# Patient Record
Sex: FEMALE | Race: WHITE | NOT HISPANIC OR LATINO | Employment: OTHER | URBAN - NONMETROPOLITAN AREA
[De-identification: names, ages, dates, MRNs, and addresses within clinical notes are randomized per-mention and may not be internally consistent; named-entity substitution may affect disease eponyms.]

---

## 2017-01-24 ENCOUNTER — OFFICE VISIT (OUTPATIENT)
Dept: OPHTHALMOLOGY | Facility: CLINIC | Age: 72
End: 2017-01-24

## 2017-01-24 DIAGNOSIS — H15.101 EPISCLERITIS, RIGHT: Primary | ICD-10-CM

## 2017-01-24 DIAGNOSIS — IMO0002 NUCLEAR CATARACT, BILATERAL: ICD-10-CM

## 2017-01-24 PROBLEM — H15.109 EPISCLERITIS: Status: ACTIVE | Noted: 2017-01-24

## 2017-01-24 PROCEDURE — 99213 OFFICE O/P EST LOW 20 MIN: CPT | Performed by: OPHTHALMOLOGY

## 2017-01-24 NOTE — PROGRESS NOTES
Subjective   Robert Derick Larios is a 71 y.o. female.   Chief Complaint   Patient presents with   • red, puffy right eye     HPI     Red, tearing,itching, matting OD x 1 month, tx initially with tobradex?, matting resolved; no vision change; hx of cataracts       Last edited by Gabo Fitzgerald MD on 1/24/2017  8:30 AM.       Review of Systems   Eyes: Positive for redness and itching.       Objective   Visual Acuity (Snellen - Linear)      Right Left   Dist cc 20/30 20/100       Correction:  Glasses               Pupils      Pupils   Right PERRL   Left PERRL            Not recorded         Extraocular Movement      Right Left   Result Full Full                 Main Ophthalmology Exam     External Exam      Right Left    External Normal Normal      Slit Lamp Exam      Right Left    Lids/Lashes Normal incr tear layer, no reflux with NLS massage Normal    Conjunctiva/Sclera 1+ Injection White and quiet    Cornea Clear Clear    Anterior Chamber Deep and quiet Deep and quiet    Iris Round and reactive Round and reactive    Lens 1+ Nuclear sclerosis 1+ Nuclear sclerosis    Vitreous Normal Normal      Fundus Exam      Right Left    Disc Normal Normal    Macula Normal Normal    Vessels Normal Normal                Assessment/Plan   Robert was seen today for red, puffy right eye.    Diagnoses and all orders for this visit:    Episcleritis, right    Nuclear cataract, bilateral      PF qid with taper  F/u if not resolved

## 2017-05-30 ENCOUNTER — OFFICE VISIT (OUTPATIENT)
Dept: OPHTHALMOLOGY | Facility: CLINIC | Age: 72
End: 2017-05-30

## 2017-05-30 DIAGNOSIS — H16.042 CORNEAL ULCER, MARGINAL, LEFT: Primary | ICD-10-CM

## 2017-05-30 DIAGNOSIS — M35.01 KERATITIS SICCA, BILATERAL (HCC): ICD-10-CM

## 2017-05-30 DIAGNOSIS — IMO0002 NUCLEAR CATARACT, BILATERAL: ICD-10-CM

## 2017-05-30 PROBLEM — H16.049 CORNEAL ULCER, MARGINAL: Status: ACTIVE | Noted: 2017-05-30

## 2017-05-30 PROCEDURE — 99213 OFFICE O/P EST LOW 20 MIN: CPT | Performed by: OPHTHALMOLOGY

## 2017-05-30 RX ORDER — TOBRAMYCIN AND DEXAMETHASONE 3; 1 MG/ML; MG/ML
1 SUSPENSION/ DROPS OPHTHALMIC
Qty: 1 EACH | Refills: 0 | OUTPATIENT
Start: 2017-05-30 | End: 2017-07-15

## 2017-07-14 ENCOUNTER — LAB (OUTPATIENT)
Dept: LAB | Facility: HOSPITAL | Age: 72
End: 2017-07-14

## 2017-07-14 ENCOUNTER — HOSPITAL ENCOUNTER (OUTPATIENT)
Dept: CT IMAGING | Facility: HOSPITAL | Age: 72
Discharge: HOME | End: 2017-07-14
Admitting: EMERGENCY MEDICINE

## 2017-07-14 DIAGNOSIS — I10 ESSENTIAL HYPERTENSION: ICD-10-CM

## 2017-07-14 DIAGNOSIS — E78.5 HYPERLIPIDEMIA: ICD-10-CM

## 2017-07-14 DIAGNOSIS — I10 ESSENTIAL HYPERTENSION, MALIGNANT: Primary | ICD-10-CM

## 2017-07-14 DIAGNOSIS — E55.9 VITAMIN D DEFICIENCY: ICD-10-CM

## 2017-07-14 LAB
ARTICHOKE IGE QN: 129 MG/DL (ref 1–129)
CHOLEST SERPL-MCNC: 218 MG/DL (ref 0–199)
HDLC SERPL-MCNC: 50 MG/DL (ref 60–200)
LDLC/HDLC SERPL: 2.23 {RATIO} (ref 0–3.22)
TRIGL SERPL-MCNC: 283 MG/DL (ref 20–199)

## 2017-07-14 PROCEDURE — 70450 CT HEAD/BRAIN W/O DYE: CPT

## 2017-07-14 PROCEDURE — 85025 COMPLETE CBC W/AUTO DIFF WBC: CPT | Performed by: EMERGENCY MEDICINE

## 2017-07-14 PROCEDURE — 80053 COMPREHEN METABOLIC PANEL: CPT | Performed by: EMERGENCY MEDICINE

## 2017-07-14 PROCEDURE — 80061 LIPID PANEL: CPT | Performed by: EMERGENCY MEDICINE

## 2017-07-14 PROCEDURE — 87086 URINE CULTURE/COLONY COUNT: CPT | Performed by: EMERGENCY MEDICINE

## 2017-07-14 PROCEDURE — 82652 VIT D 1 25-DIHYDROXY: CPT | Performed by: GENERAL PRACTICE

## 2017-07-17 ENCOUNTER — OFFICE VISIT (OUTPATIENT)
Dept: CARDIOLOGY | Facility: CLINIC | Age: 72
End: 2017-07-17

## 2017-07-17 VITALS
HEIGHT: 63 IN | SYSTOLIC BLOOD PRESSURE: 142 MMHG | BODY MASS INDEX: 27.29 KG/M2 | DIASTOLIC BLOOD PRESSURE: 84 MMHG | WEIGHT: 154 LBS | HEART RATE: 80 BPM

## 2017-07-17 DIAGNOSIS — R94.31 ABNORMAL EKG: ICD-10-CM

## 2017-07-17 DIAGNOSIS — E78.2 MIXED HYPERLIPIDEMIA: Primary | ICD-10-CM

## 2017-07-17 DIAGNOSIS — R00.2 PALPITATION: ICD-10-CM

## 2017-07-17 DIAGNOSIS — R42 DIZZINESS: ICD-10-CM

## 2017-07-17 DIAGNOSIS — I10 ESSENTIAL HYPERTENSION: ICD-10-CM

## 2017-07-17 DIAGNOSIS — R07.2 PRECORDIAL PAIN: ICD-10-CM

## 2017-07-17 PROCEDURE — 99204 OFFICE O/P NEW MOD 45 MIN: CPT | Performed by: INTERNAL MEDICINE

## 2017-07-17 PROCEDURE — 93000 ELECTROCARDIOGRAM COMPLETE: CPT | Performed by: INTERNAL MEDICINE

## 2017-07-17 RX ORDER — METOPROLOL SUCCINATE 25 MG/1
25 TABLET, EXTENDED RELEASE ORAL DAILY
Qty: 30 TABLET | Refills: 11 | Status: SHIPPED | OUTPATIENT
Start: 2017-07-17 | End: 2018-07-02 | Stop reason: SDUPTHER

## 2017-07-17 RX ORDER — PROPRANOLOL HYDROCHLORIDE 20 MG/1
20 TABLET ORAL DAILY
COMMUNITY
End: 2017-07-17

## 2017-07-17 RX ORDER — VITAMIN E 268 MG
400 CAPSULE ORAL DAILY
COMMUNITY
End: 2018-10-15

## 2017-07-17 RX ORDER — DOCUSATE SODIUM 100 MG/1
100 CAPSULE, LIQUID FILLED ORAL DAILY
COMMUNITY
End: 2017-08-03

## 2017-07-17 RX ORDER — ASPIRIN 81 MG/1
81 TABLET ORAL WEEKLY
COMMUNITY

## 2017-07-17 NOTE — PROGRESS NOTES
Robert Larios  71 y.o. female    07/17/2017  1. Mixed hyperlipidemia    2. Essential hypertension    3. Palpitation    4. Abnormal EKG    5. Dizziness    6. Precordial pain        History of Present Illness    Mrs. Larios is a 71 year-old  lady was referred for evaluation of palpitation and an abnormal EKG.  She was recently evaluated by Dr. Vu for dizziness and palpitation.  Her dizziness has been present for the past few weeks and she has attributed this to an inner ear problem on the left side.  She has been treated with meclizine.  She has had intermittent episodes of palpitation with seems to occur at different times and usually triggered by anxiety.  This is a long-standing problem and she has use propranolol when necessary for this.  She brought several blood pressure readings and her systolic blood pressures have ranged from 100 to 130 and diastolic from 70-90.  He indicated that her blood pressure is usually much higher in the doctor's office.  She has no previous documented coronary artery disease, valvular heart disease or cardiac arrhythmias.  An EKG performed by  apparently showed ST-T wave changes and hence cardiac consultation was requested.  EKG in our office showed sinus rhythm with nonspecific ST-T changes and poor R wave progression in the anteroseptal leads.  No arrhythmia was noted.  She has no history of excessive caffeine intake or use of over-the-counter medications or thyroid abnormalities.  She denied any anderson chest pain but occasionally has chest pressure with palpitations.        SUBJECTIVE    Allergies   Allergen Reactions   • Adhesive Tape      Blistering of Skin   • Darvon [Propoxyphene] Nausea And Vomiting   • Bactrim [Sulfamethoxazole-Trimethoprim] Rash   • Macrodantin [Nitrofurantoin Macrocrystal]      Drug Fever   • Sulfa Antibiotics Rash         Past Medical History:   Diagnosis Date   • Acute otitis media 09/10/2015   • Acute pharyngitis 09/10/2015    • Acute sinusitis 09/10/2015   • Biliary colic 10/17/2014    STONES BY REVIEW US   • Change in bowel habits 06/28/2016   • Chest rales [id number]    MEDIUM   • Cholelithiasis without obstruction 10/17/2014   • Chronic cholecystitis 10/17/2014   • Constipation 02/26/2015   • Degenerative joint disease involving multiple joints 09/10/2014   • Elevated blood-pressure reading without diagnosis of hypertension 05/31/2012    BLOOD PRESSURE NORMAL AT HOME   • Epigastric pain 09/10/2014   • Essential hypertension 07/30/2015   • Gastritis 02/26/2015   • GERD (gastroesophageal reflux disease) 01/28/2015   • Hammer toe 12/02/2015   • History of colonoscopy 08/28/2014    Information taken from [facility]    • Hyperlipidemia 07/30/2015   • Ingrowing nail [id number]   • Irritable bowel disease    • Pain in right foot 11/11/2015   • Peripheral neuropathy 09/10/2014         Past Surgical History:   Procedure Laterality Date   • BREAST BIOPSY  11/22/2004    R mammotome biop w/ image guided placement of metallic localization clip & stereotactic location & guidance for breast biop. radiologic examination of the surgical specimen & unilateral mammography   • CHOLECYSTECTOMY  10/09/2014   • COLONOSCOPY  08/28/2014    Hemorrhoids found.   • DILATATION AND CURETTAGE  08/28/2014    Postmenopausal bleeding   • ENDOSCOPY  08/28/2014    Normal hypopharynx, esophagus. A hiatus hernia found in the GE junction.Non-erosive gastritis found inthe stomach. Multiple biopsies taken.Moderate amount of bile sained fluid in the stomach.Normal, symmetrical, and patent pylorus.Normal duodenum.   • HYSTERECTOMY  [id number]   • INJECTION OF MEDICATION  07/30/2015    KENALOG(1)   • NASAL MASS EXCISION     • TONSILLECTOMY           Family History   Problem Relation Age of Onset   • Thyroid disease Mother    • Prostate cancer Father    • Cancer Other    • Diabetes Other    • Heart disease Other    • Stroke Other    • Thyroid disease Other          Social History  "    Social History   • Marital status:      Spouse name: N/A   • Number of children: N/A   • Years of education: N/A     Occupational History   • Not on file.     Social History Main Topics   • Smoking status: Never Smoker   • Smokeless tobacco: Never Used   • Alcohol use No   • Drug use: No   • Sexual activity: Defer     Other Topics Concern   • Not on file     Social History Narrative         Current Outpatient Prescriptions   Medication Sig Dispense Refill   • aluminum hydroxide-mag carbonate (GAVISCON EXTRA RELIEF) 160-105 MG chewable tablet chewable tablet Chew Daily.     • aspirin 81 MG EC tablet Take 81 mg by mouth Daily.     • cephalexin (KEFLEX) 500 MG capsule Take 1 capsule by mouth 4 (Four) Times a Day for 7 days. 28 capsule 0   • docusate sodium (COLACE) 100 MG capsule Take 100 mg by mouth Daily.     • esomeprazole (NexIUM) 40 MG capsule Take 40 mg by mouth 2 (two) times a day.     • loperamide (IMODIUM) 2 MG capsule Take 2 mg by mouth As Needed for diarrhea (2 times per week).     • meclizine (ANTIVERT) 25 MG tablet Take 1 tablet by mouth 3 (Three) Times a Day As Needed for dizziness for up to 7 days. 21 tablet 0   • Multiple Vitamins-Minerals (HAIR SKIN AND NAILS FORMULA) tablet Take  by mouth.     • NON FORMULARY 30mg vitamin B-6  400 mcg vitamin B-12  350 mg DHA  150 mg EPA  200 mg L-Carnitine  50mg CO-Q 10  30 mg trans-resveratrol     • olopatadine (PATANOL) 0.1 % ophthalmic solution 1 drop 2 (Two) Times a Day.     • Unable to find 1 each 1 (One) Time. Bone Restore     • vitamin E 400 UNIT capsule Take 400 Units by mouth Daily.     • metoprolol succinate XL (TOPROL-XL) 25 MG 24 hr tablet Take 1 tablet by mouth Daily. 30 tablet 11     No current facility-administered medications for this visit.          OBJECTIVE    /84  Pulse 80  Ht 63\" (160 cm)  Wt 154 lb (69.9 kg)  BMI 27.28 kg/m2        Review of Systems     Constitutional:  Denies recent weight loss, weight gain, fever or " chills, no change in exercise tolerance     HENT:  Denies any hearing loss, epistaxis, hoarseness, or difficulty speaking.     Eyes: Wears eyeglasses or contact lenses     Respiratory:  Denies dyspnea with exertion,no cough, wheezing, or hemoptysis.     Cardiovascular: See HPI     Gastrointestinal:  Denies change in bowel habits, dyspepsia, ulcer disease, hematochezia, or melena.     Endocrine: Negative for cold intolerance, heat intolerance, polydipsia, polyphagia and polyuria.     Genitourinary: Negative.      Musculoskeletal: Denies any history of arthritic symptoms or back problems     Skin:  Denies any change in hair or nails, rashes, or skin lesions.     Allergic/Immunologic: Negative.  Negative for environmental allergies, food allergies and immunocompromised state.     Neurological:  Denies any history of recurrent headaches, strokes, TIA, or seizure disorder.     Hematological: Denies any food allergies, seasonal allergies, bleeding disorders, or lymphadenopathy.       Physical Exam     Constitutional: Cooperative, alert and oriented, well-developed,  in no acute distress.     HENT:   Head: Normocephalic, normal hair patterns, no masses or tenderness.  Ears, Nose, and Throat: No gross abnormalities. No pallor or cyanosis.   Eyes: EOMS intact, PERRL, conjunctivae and lids unremarkable. Fundoscopic exam and visual fields not performed.   Neck: No palpable masses or adenopathy, no thyromegaly, no JVD, carotid pulses are full and equal bilaterally and without  Bruits.     Cardiovascular: Regular rhythm, S1 and S2 normal, no S3 or S4.  No murmurs, gallops, or rubs detected.     Pulmonary/Chest: Chest: normal symmetry, no tenderness to palpation, normal respiratory excursion,  no use of accessory muscles.            Pulmonary: Normal breath sounds. No rales or ronchi.    Abdominal: Abdomen soft, bowel sounds normoactive, no masses, no hepatosplenomegaly, non-tender, no bruits.     Musculoskeletal: No deformities,  clubbing, cyanosis, erythema, or edema observed.      Neurological: No gross motor or sensory deficits noted, affect appropriate, oriented to time, person, place.     Skin: Warm and dry to the touch, no apparent skin lesions or masses noted.           ECG 12 Lead  Date/Time: 7/17/2017 5:17 PM  Performed by: SINA CORTES  Authorized by: SINA CORTES   Comparison: not compared with previous ECG   Rhythm: sinus rhythm  Rate: normal  QRS axis: normal  Comments: EKG shows sinus rhythm heart rate of 80 bpm.  Minimal nonspecific ST-T changes.  Poor R wave progression in the anteroseptal leads              Lab Results   Component Value Date    WBC 6.73 07/14/2017    HGB 14.9 07/14/2017    HCT 44.7 07/14/2017    MCV 92.2 07/14/2017     07/14/2017     Lab Results   Component Value Date    GLUCOSE 117 (H) 07/14/2017    BUN 14 07/14/2017    CREATININE 0.72 07/14/2017    EGFRIFNONA 80 07/14/2017    BCR 19.4 07/14/2017    CO2 22.0 07/14/2017    CALCIUM 9.4 07/14/2017    ALBUMIN 4.40 07/14/2017    LABIL2 1.5 07/14/2017    AST 38 (H) 07/14/2017    ALT 49 07/14/2017     Lab Results   Component Value Date    CHOL 218 (H) 07/14/2017     Lab Results   Component Value Date    TRIG 283 (H) 07/14/2017    TRIG 214 (H) 07/26/2016    TRIG 221 (H) 07/27/2015     Lab Results   Component Value Date    HDL 50 (L) 07/14/2017    HDL 53 (L) 07/26/2016     Lab Results   Component Value Date    LDLCALC 106 07/26/2016    LDLCALC 116 07/27/2015    LDLCALC 126 07/18/2014     No results found for: LDL  No results found for: HDLLDLRATIO  No components found for: CHOLHDL  Lab Results   Component Value Date    HGBA1C 5.6 07/27/2015     Lab Results   Component Value Date    TSH 1.06 12/04/2015           ASSESSMENT AND PLAN    Mrs. Larios has presented with dizziness which is most likely related to her in a or problems and unrelated to palpitations.  No arrhythmias have been documented thus far.  I would suggest discontinuing  propranolol and starting her on Toprol-XL 25 mg daily on a regular basis.  Antiplatelet therapy with aspirin has been continued.  An echocardiogram to assess left ventricular and valvular function and a CT angiogram to rule out coronary artery disease has been arranged.  Further recommendations will follow.  Thank you for asking me to see this patient.  Diagnoses and all orders for this visit:    Mixed hyperlipidemia  -     Adult Transthoracic Echo Complete; Future  -     CT Angiogram Coronary; Future    Essential hypertension  -     Adult Transthoracic Echo Complete; Future  -     CT Angiogram Coronary; Future    Palpitation  -     Adult Transthoracic Echo Complete; Future  -     CT Angiogram Coronary; Future    Abnormal EKG  -     Adult Transthoracic Echo Complete; Future  -     CT Angiogram Coronary; Future    Dizziness  -     Adult Transthoracic Echo Complete; Future  -     CT Angiogram Coronary; Future    Precordial pain  -     CT Angiogram Coronary; Future        Peggy Govea MD  7/17/2017  5:11 PM

## 2017-07-18 LAB — 1,25(OH)2D3 SERPL-MCNC: 73 PG/ML (ref 19.9–79.3)

## 2017-07-20 ENCOUNTER — HOSPITAL ENCOUNTER (OUTPATIENT)
Dept: CT IMAGING | Facility: HOSPITAL | Age: 72
Discharge: HOME | End: 2017-07-20
Admitting: INTERNAL MEDICINE

## 2017-07-20 VITALS
HEART RATE: 80 BPM | DIASTOLIC BLOOD PRESSURE: 73 MMHG | SYSTOLIC BLOOD PRESSURE: 160 MMHG | RESPIRATION RATE: 18 BRPM | OXYGEN SATURATION: 96 %

## 2017-07-20 DIAGNOSIS — R94.31 ABNORMAL EKG: ICD-10-CM

## 2017-07-20 DIAGNOSIS — I10 ESSENTIAL HYPERTENSION: ICD-10-CM

## 2017-07-20 DIAGNOSIS — R07.2 PRECORDIAL PAIN: ICD-10-CM

## 2017-07-20 DIAGNOSIS — E78.2 MIXED HYPERLIPIDEMIA: ICD-10-CM

## 2017-07-20 DIAGNOSIS — R00.2 PALPITATION: ICD-10-CM

## 2017-07-20 DIAGNOSIS — R42 DIZZINESS: ICD-10-CM

## 2017-07-20 PROCEDURE — 0 IOPAMIDOL PER 1 ML: Performed by: INTERNAL MEDICINE

## 2017-07-20 PROCEDURE — 75574 CT ANGIO HRT W/3D IMAGE: CPT

## 2017-07-20 RX ORDER — METOPROLOL TARTRATE 5 MG/5ML
INJECTION INTRAVENOUS
Status: COMPLETED | OUTPATIENT
Start: 2017-07-20 | End: 2017-07-20

## 2017-07-20 RX ADMIN — METOPROLOL TARTRATE 5 MG: 5 INJECTION INTRAVENOUS at 11:27

## 2017-07-20 RX ADMIN — METOPROLOL TARTRATE 5 MG: 5 INJECTION INTRAVENOUS at 11:34

## 2017-07-20 RX ADMIN — IOPAMIDOL 80 ML: 755 INJECTION, SOLUTION INTRAVENOUS at 12:00

## 2017-07-20 RX ADMIN — METOPROLOL TARTRATE 5 MG: 5 INJECTION INTRAVENOUS at 11:21

## 2017-08-03 ENCOUNTER — LAB (OUTPATIENT)
Dept: LAB | Facility: HOSPITAL | Age: 72
End: 2017-08-03

## 2017-08-03 ENCOUNTER — OFFICE VISIT (OUTPATIENT)
Dept: FAMILY MEDICINE CLINIC | Facility: CLINIC | Age: 72
End: 2017-08-03

## 2017-08-03 VITALS
HEART RATE: 90 BPM | SYSTOLIC BLOOD PRESSURE: 146 MMHG | WEIGHT: 158.7 LBS | DIASTOLIC BLOOD PRESSURE: 66 MMHG | OXYGEN SATURATION: 98 % | HEIGHT: 63 IN | BODY MASS INDEX: 28.12 KG/M2

## 2017-08-03 DIAGNOSIS — Z12.31 ENCOUNTER FOR SCREENING MAMMOGRAM FOR MALIGNANT NEOPLASM OF BREAST: ICD-10-CM

## 2017-08-03 DIAGNOSIS — M81.0 OSTEOPOROSIS: ICD-10-CM

## 2017-08-03 DIAGNOSIS — I10 ESSENTIAL HYPERTENSION, MALIGNANT: ICD-10-CM

## 2017-08-03 DIAGNOSIS — Z13.820 ENCOUNTER FOR SCREENING FOR OSTEOPOROSIS: ICD-10-CM

## 2017-08-03 DIAGNOSIS — Z11.59 NEED FOR HEPATITIS C SCREENING TEST: ICD-10-CM

## 2017-08-03 DIAGNOSIS — E78.2 MIXED HYPERLIPIDEMIA: Primary | Chronic | ICD-10-CM

## 2017-08-03 DIAGNOSIS — I10 ESSENTIAL HYPERTENSION: Chronic | ICD-10-CM

## 2017-08-03 DIAGNOSIS — R63.5 WEIGHT GAIN: ICD-10-CM

## 2017-08-03 LAB
ALBUMIN SERPL-MCNC: 4 G/DL (ref 3.4–4.8)
ALBUMIN/GLOB SERPL: 1.4 G/DL (ref 1.1–1.8)
ALP SERPL-CCNC: 73 U/L (ref 38–126)
ALT SERPL W P-5'-P-CCNC: 42 U/L (ref 9–52)
ANION GAP SERPL CALCULATED.3IONS-SCNC: 13 MMOL/L (ref 5–15)
AST SERPL-CCNC: 29 U/L (ref 14–36)
BILIRUB SERPL-MCNC: 0.6 MG/DL (ref 0.2–1.3)
BUN BLD-MCNC: 12 MG/DL (ref 7–21)
BUN/CREAT SERPL: 17.9 (ref 7–25)
CALCIUM SPEC-SCNC: 9.8 MG/DL (ref 8.4–10.2)
CHLORIDE SERPL-SCNC: 104 MMOL/L (ref 95–110)
CO2 SERPL-SCNC: 22 MMOL/L (ref 22–31)
CREAT BLD-MCNC: 0.67 MG/DL (ref 0.5–1)
GFR SERPL CREATININE-BSD FRML MDRD: 87 ML/MIN/1.73 (ref 60–90)
GLOBULIN UR ELPH-MCNC: 2.9 GM/DL (ref 2.3–3.5)
GLUCOSE BLD-MCNC: 85 MG/DL (ref 60–100)
POTASSIUM BLD-SCNC: 4.1 MMOL/L (ref 3.5–5.1)
PROT SERPL-MCNC: 6.9 G/DL (ref 6.3–8.6)
SODIUM BLD-SCNC: 139 MMOL/L (ref 137–145)
T4 FREE SERPL-MCNC: 1.26 NG/DL (ref 0.78–2.19)
TSH SERPL DL<=0.05 MIU/L-ACNC: 0.53 MIU/ML (ref 0.46–4.68)

## 2017-08-03 PROCEDURE — 36415 COLL VENOUS BLD VENIPUNCTURE: CPT

## 2017-08-03 PROCEDURE — 84443 ASSAY THYROID STIM HORMONE: CPT | Performed by: GENERAL PRACTICE

## 2017-08-03 PROCEDURE — 84439 ASSAY OF FREE THYROXINE: CPT | Performed by: GENERAL PRACTICE

## 2017-08-03 PROCEDURE — 99214 OFFICE O/P EST MOD 30 MIN: CPT | Performed by: GENERAL PRACTICE

## 2017-08-03 PROCEDURE — 80053 COMPREHEN METABOLIC PANEL: CPT | Performed by: GENERAL PRACTICE

## 2017-08-03 PROCEDURE — 86803 HEPATITIS C AB TEST: CPT | Performed by: GENERAL PRACTICE

## 2017-08-03 RX ORDER — LANOLIN ALCOHOL/MO/W.PET/CERES
400 CREAM (GRAM) TOPICAL DAILY
COMMUNITY
Start: 2017-07-18 | End: 2020-11-02

## 2017-08-03 RX ORDER — MV-MIN/FOLIC/VIT K/LYCOP/COQ10 200-100MCG
CAPSULE ORAL
COMMUNITY
Start: 2017-07-18 | End: 2021-12-28

## 2017-08-03 NOTE — PROGRESS NOTES
Subjective   Robert Larios is a 72 y.o. female.     Chief Complaint   Patient presents with   • Hypertension   • Hyperlipidemia     For review and evaluation of management of chronic medical problems. Labs reviewed. Due for mammogram and bone density. Had dizziness in July, seen in [facility], was tachycardiac and EKG showed some ST changes so had CT angiogram and echo with no significant findings. Started on metoprolol by Dr. Govea for elevated blood pressure and hypertension. Has gained 10 lbs in last year unexpectedly.   Hypertension   This is a chronic problem. The current episode started more than 1 year ago. The problem is unchanged (white coat syndrome). The problem is controlled. Associated symptoms include anxiety. Pertinent negatives include no chest pain, headaches, neck pain, palpitations or shortness of breath. There are no associated agents to hypertension. Past treatments include beta blockers. The current treatment provides significant improvement. There are no compliance problems.    Hyperlipidemia   This is a chronic problem. The current episode started more than 1 year ago. The problem is controlled. Recent lipid tests were reviewed and are high (minimally elevated). There are no known factors aggravating her hyperlipidemia. Pertinent negatives include no chest pain, myalgias or shortness of breath. Current antihyperlipidemic treatment includes diet change. The current treatment provides significant improvement of lipids.      The following portions of the patient's history were reviewed and updated as appropriate: allergies, current medications, past family and social history and problem list.    Outpatient Medications Prior to Visit   Medication Sig Dispense Refill   • aluminum hydroxide-mag carbonate (GAVISCON EXTRA RELIEF) 160-105 MG chewable tablet chewable tablet Chew Daily.     • aspirin 81 MG EC tablet Take 81 mg by mouth Daily.     • esomeprazole (NexIUM) 40 MG capsule Take 40 mg  by mouth 2 (two) times a day.     • loperamide (IMODIUM) 2 MG capsule Take 2 mg by mouth As Needed for diarrhea (2 times per week).     • metoprolol succinate XL (TOPROL-XL) 25 MG 24 hr tablet Take 1 tablet by mouth Daily. 30 tablet 11   • Multiple Vitamins-Minerals (HAIR SKIN AND NAILS FORMULA) tablet Take  by mouth.     • NON FORMULARY 30mg vitamin B-6  400 mcg vitamin B-12  350 mg DHA  150 mg EPA  200 mg L-Carnitine  50mg CO-Q 10  30 mg trans-resveratrol     • Unable to find 1 each 1 (One) Time. Bone Restore     • vitamin E 400 UNIT capsule Take 400 Units by mouth Daily.     • docusate sodium (COLACE) 100 MG capsule Take 100 mg by mouth Daily.     • olopatadine (PATANOL) 0.1 % ophthalmic solution 1 drop 2 (Two) Times a Day.       No facility-administered medications prior to visit.      Review of Systems   Constitutional: Negative.  Negative for chills, fatigue, fever and unexpected weight change.   HENT: Negative.  Negative for congestion, ear pain, hearing loss, nosebleeds, rhinorrhea, sneezing, sore throat and tinnitus.    Eyes: Negative.  Negative for discharge.   Respiratory: Negative.  Negative for cough, shortness of breath and wheezing.    Cardiovascular: Negative.  Negative for chest pain and palpitations.   Gastrointestinal: Negative.  Negative for abdominal pain, constipation, diarrhea, nausea and vomiting.   Endocrine: Negative.    Genitourinary: Negative.  Negative for dysuria, frequency and urgency.   Musculoskeletal: Negative.  Negative for arthralgias, back pain, joint swelling, myalgias and neck pain.   Skin: Negative.  Negative for rash.   Allergic/Immunologic: Negative.    Neurological: Negative.  Negative for dizziness, weakness, numbness and headaches.   Hematological: Negative.  Negative for adenopathy.   Psychiatric/Behavioral: Negative.  Negative for dysphoric mood and sleep disturbance. The patient is not nervous/anxious.      Objective     Visit Vitals   • /66   • Pulse 90   • Ht  "63\" (160 cm)   • Wt 158 lb 11.2 oz (72 kg)   • SpO2 98%   • BMI 28.11 kg/m2     Physical Exam   Constitutional: She is oriented to person, place, and time. She appears well-developed and well-nourished. No distress.   HENT:   Head: Normocephalic and atraumatic.   Nose: Nose normal.   Mouth/Throat: Oropharynx is clear and moist.   Eyes: Conjunctivae and EOM are normal. Pupils are equal, round, and reactive to light. Right eye exhibits no discharge. Left eye exhibits no discharge.   Neck: No tracheal deviation present. No thyromegaly present.   Cardiovascular: Normal rate, regular rhythm, normal heart sounds and intact distal pulses.    No murmur heard.  Pulmonary/Chest: Effort normal and breath sounds normal. No respiratory distress. She has no wheezes. She has no rales. She exhibits no tenderness. Right breast exhibits no inverted nipple, no mass, no nipple discharge, no skin change and no tenderness. Left breast exhibits no inverted nipple, no mass, no nipple discharge, no skin change and no tenderness.   Abdominal: Soft. Bowel sounds are normal. She exhibits no distension and no mass. There is no tenderness. No hernia.   Musculoskeletal: Normal range of motion. She exhibits no deformity.   Lymphadenopathy:     She has no cervical adenopathy.   Neurological: She is alert and oriented to person, place, and time. She has normal reflexes.   Skin: Skin is warm and dry.   Psychiatric: She has a normal mood and affect. Her behavior is normal. Judgment and thought content normal.   Nursing note and vitals reviewed.    Results for orders placed or performed during the hospital encounter of 07/24/17   Adult Transthoracic Echo Complete   Result Value Ref Range    BSA 1.7 m^2     CV ECHO ALYCIA - RVDD 2.5 cm    IVSd 1.3 cm    LVIDd 4.4 cm    LVIDs 2.8 cm    LVPWd 1.1 cm    IVS/LVPW 1.1     FS 36.4 %    EDV(Teich) 87.7 ml    ESV(Teich) 29.6 ml    EF(Teich) 66.3 %    EDV(cubed) 85.2 ml    ESV(cubed) 22.0 ml    EF(cubed) 74.2 % "    LV mass(C)d 185.6 grams    LV mass(C)dI 107.3 grams/m^2    SV(Teich) 58.1 ml    SI(Teich) 33.6 ml/m^2    SV(cubed) 63.2 ml    SI(cubed) 36.5 ml/m^2    EPSS 0.3 cm    Ao root diam 3.3 cm    Ao root area 8.6 cm^2    ACS 1.8 cm    LA dimension 3.5 cm    LA/Ao 1.1     Ao root area (BSA corrected) 1.9     MV E max thad 78.0 cm/sec    MV A max thad 108.0 cm/sec    MV E/A 0.72     Ao pk thad 143.0 cm/sec    Ao max PG 8.2 mmHg    Ao max PG (full) 0.56 mmHg    Ao V2 mean 102.0 cm/sec    Ao mean PG 5.0 mmHg    Ao mean PG (full) 2.0 mmHg    Ao V2 VTI 31.8 cm    LV V1 max PG 7.6 mmHg    LV V1 mean PG 3.0 mmHg    LV V1 max 138.0 cm/sec    LV V1 mean 82.1 cm/sec    LV V1 VTI 28.2 cm    SV(Ao) 272.0 ml    SI(Ao) 157.2 ml/m^2    PA V2 max 90.9 cm/sec    PA max PG 3.3 mmHg    PA V2 mean 71.9 cm/sec    PA mean PG 2.0 mmHg    PA V2 VTI 20.9 cm    TR max thad 175.0 cm/sec    RVSP(TR) 22.3 mmHg    RAP systole 10.0 mmHg     CV ECHO ALYCIA - BZI_BMI 27.3 kilograms/m^2     CV ECHO ALYCIA - BSA(HAYCOCK) 1.8 m^2     CV ECHO ALYCIA - BZI_METRIC_WEIGHT 69.9 kg     CV ECHO ALYCIA - BZI_METRIC_HEIGHT 160.0 cm    Echo EF Estimated 60 %      Assessment/Plan   Problem List Items Addressed This Visit        Cardiovascular and Mediastinum    Hyperlipidemia (Chronic)    Essential hypertension (Chronic)      Other Visit Diagnoses     Encounter for screening mammogram for malignant neoplasm of breast    -  Primary    Relevant Orders    Mammo Screening Digital Tomosynthesis Bilateral With CAD    Osteoporosis        Encounter for screening for osteoporosis        Relevant Orders    DEXA Bone Density Axial    Weight gain        Relevant Orders    TSH    T4, Free    Need for hepatitis C screening test        Relevant Orders    Hepatitis C antibody        Will notify regarding results. Follow up with Dr. Butler as scheduled.     New Medications Ordered This Visit   Medications   • tobramycin-dexamethasone (TOBRADEX) 0.3-0.1 % ophthalmic ointment     Sig:  Administer  to both eyes Daily.   • Coenzyme Q10 150 MG capsule   • Calcium Carbonate-Vit D-Min (CALTRATE PLUS PO)   • Omega-3 350 MG capsule delayed-release   • folic acid (FOLVITE) 400 MCG tablet     Return in about 2 months (around 10/9/2017) for medicare wellness visit.

## 2017-08-04 LAB — HCV AB SER DONR QL: NEGATIVE

## 2017-08-23 ENCOUNTER — OFFICE VISIT (OUTPATIENT)
Dept: CARDIOLOGY | Facility: CLINIC | Age: 72
End: 2017-08-23

## 2017-08-23 VITALS
WEIGHT: 158 LBS | HEIGHT: 63 IN | HEART RATE: 79 BPM | BODY MASS INDEX: 28 KG/M2 | DIASTOLIC BLOOD PRESSURE: 85 MMHG | SYSTOLIC BLOOD PRESSURE: 130 MMHG

## 2017-08-23 DIAGNOSIS — I10 ESSENTIAL HYPERTENSION: Chronic | ICD-10-CM

## 2017-08-23 DIAGNOSIS — E78.2 MIXED HYPERLIPIDEMIA: Primary | Chronic | ICD-10-CM

## 2017-08-23 PROCEDURE — 99212 OFFICE O/P EST SF 10 MIN: CPT | Performed by: INTERNAL MEDICINE

## 2017-08-23 NOTE — PROGRESS NOTES
Robert Larios  72 y.o. female    08/23/2017  1. Mixed hyperlipidemia    2. Essential hypertension        History of Present Illness    Mrs. Larios is here for follow-up of above stated problems.  Her dizziness has improved and it was most likely secondary to inner ear year problems.  Echocardiogram showed normal LV systolic function with no wall motion abnormalities.  CT angiogram of the coronary arteries showed no CAD and calcium score was 0 with normal LV systolic function.        SUBJECTIVE    Allergies   Allergen Reactions   • Adhesive Tape      Blistering of Skin   • Darvon [Propoxyphene] Nausea And Vomiting   • Bactrim [Sulfamethoxazole-Trimethoprim] Rash   • Macrodantin [Nitrofurantoin Macrocrystal]      Drug Fever   • Sulfa Antibiotics Rash         Past Medical History:   Diagnosis Date   • Acute otitis media 09/10/2015   • Acute pharyngitis 09/10/2015   • Acute sinusitis 09/10/2015   • Biliary colic 10/17/2014    STONES BY REVIEW US   • Change in bowel habits 06/28/2016   • Chest rales [id number]    MEDIUM   • Cholelithiasis without obstruction 10/17/2014   • Chronic cholecystitis 10/17/2014   • Constipation 02/26/2015   • Degenerative joint disease involving multiple joints 09/10/2014   • Elevated blood-pressure reading without diagnosis of hypertension 05/31/2012    BLOOD PRESSURE NORMAL AT HOME   • Epigastric pain 09/10/2014   • Essential hypertension 07/30/2015   • Fibrocystic breast    • Gastritis 02/26/2015   • GERD (gastroesophageal reflux disease) 01/28/2015   • Hammer toe 12/02/2015   • History of colonoscopy 08/28/2014    Information [facility]    • Hyperlipidemia 07/30/2015   • Ingrowing nail [id number]   • Irritable bowel disease    • Pain in right foot 11/11/2015   • Peripheral neuropathy 09/10/2014         Past Surgical History:   Procedure Laterality Date   • BREAST BIOPSY  11/22/2004    R mammotome biop w/ image guided placement of metallic localization clip & stereotactic location &  guidance for breast biop. radiologic examination of the surgical specimen & unilateral mammography   • BREAST CYST ASPIRATION     • CHOLECYSTECTOMY  10/09/2014   • COLONOSCOPY  08/28/2014    Hemorrhoids found.   • DILATATION AND CURETTAGE  08/28/2014    Postmenopausal bleeding   • ENDOSCOPY  08/28/2014    Normal hypopharynx, esophagus. A hiatus hernia found in the GE junction.Non-erosive gastritis found inthe stomach. Multiple biopsies taken.Moderate amount of bile sained fluid in the stomach.Normal, symmetrical, and patent pylorus.Normal duodenum.   • HYSTERECTOMY  [id number]   • INJECTION OF MEDICATION  07/30/2015    KENALOG(1)   • NASAL MASS EXCISION     • TONSILLECTOMY           Family History   Problem Relation Age of Onset   • Thyroid disease Mother    • Prostate cancer Father    • Cancer Other    • Diabetes Other    • Heart disease Other    • Stroke Other    • Thyroid disease Other          Social History     Social History   • Marital status:      Spouse name: N/A   • Number of children: N/A   • Years of education: N/A     Occupational History   • Not on file.     Social History Main Topics   • Smoking status: Never Smoker   • Smokeless tobacco: Never Used   • Alcohol use No   • Drug use: No   • Sexual activity: Defer     Other Topics Concern   • Not on file     Social History Narrative         Current Outpatient Prescriptions   Medication Sig Dispense Refill   • aluminum hydroxide-mag carbonate (GAVISCON EXTRA RELIEF) 160-105 MG chewable tablet chewable tablet Chew Daily.     • aspirin 81 MG EC tablet Take 81 mg by mouth Daily.     • Calcium Carbonate-Vit D-Min (CALTRATE PLUS PO)      • Coenzyme Q10 150 MG capsule      • esomeprazole (nexIUM) 20 MG capsule Take 20 mg by mouth 2 (Two) Times a Day.     • folic acid (FOLVITE) 400 MCG tablet      • loperamide (IMODIUM) 2 MG capsule Take 2 mg by mouth As Needed for diarrhea (2 times per week).     • metoprolol succinate XL (TOPROL-XL) 25 MG 24 hr tablet  "Take 1 tablet by mouth Daily. 30 tablet 11   • Multiple Vitamins-Minerals (HAIR SKIN AND NAILS FORMULA) tablet Take  by mouth.     • NON FORMULARY 30mg vitamin B-6  400 mcg vitamin B-12  350 mg DHA  150 mg EPA  200 mg L-Carnitine  50mg CO-Q 10  30 mg trans-resveratrol     • Omega-3 350 MG capsule delayed-release      • Unable to find 1 each 1 (One) Time. Bone Restore     • vitamin E 400 UNIT capsule Take 400 Units by mouth Daily.       No current facility-administered medications for this visit.          OBJECTIVE    /85  Pulse 79  Ht 63\" (160 cm)  Wt 158 lb (71.7 kg)  BMI 27.99 kg/m2        Review of Systems     Constitutional:  Denies recent weight loss, weight gain, fever or chills, no change in exercise tolerance     HENT:  Denies any hearing loss, epistaxis, hoarseness, or difficulty speaking.     Eyes: Wears eyeglasses or contact lenses     Respiratory:  Denies dyspnea with exertion,no cough, wheezing, or hemoptysis.     Cardiovascular: Negative for palpations, chest pain, orthopnea, PND, peripheral edema, syncope, or claudication.     Gastrointestinal:  Denies change in bowel habits, dyspepsia, ulcer disease, hematochezia, or melena.     Endocrine: Negative for cold intolerance, heat intolerance, polydipsia, polyphagia and polyuria.    Genitourinary: Negative.      Musculoskeletal: Denies any history of arthritic symptoms or back problems     Skin:  Denies any change in hair or nails, rashes, or skin lesions.     Allergic/Immunologic: Negative.  Negative for environmental allergies, food allergies and immunocompromised state.     Neurological:  Denies any history of recurrent headaches, strokes, TIA, or seizure disorder.     Hematological: Denies any food allergies, seasonal allergies, bleeding disorders, or lymphadenopathy.     Psychiatric/Behavioral: Denies any history of depression, substance abuse, or change in cognitive function.         Physical Exam     Constitutional: Cooperative, alert and " oriented, well-developed, well-nourished, in no acute distress.     HENT:   Head: Normocephalic, normal hair patterns, no masses or tenderness.  Ears, Nose, and Throat: No gross abnormalities. No pallor or cyanosis.   Eyes: EOMS intact, PERRL, conjunctivae and lids unremarkable. Fundoscopic exam and visual fields not performed.   Neck: No palpable masses or adenopathy, no thyromegaly, no JVD, carotid pulses are full and equal bilaterally and without  Bruits.     Cardiovascular: Regular rhythm, S1 and S2 normal, no S3 or S4. No murmurs, gallops, or rubs detected.     Pulmonary/Chest: Chest: normal symmetry, no tenderness to palpation, normal respiratory excursion, no intercostal retraction, no use of accessory muscles.            Pulmonary: Normal breath sounds. No rales or ronchi.    Abdominal: Abdomen soft, bowel sounds normoactive, no masses, no hepatosplenomegaly, non-tender, no bruits.     Musculoskeletal: No deformities, clubbing, cyanosis, erythema, or edema observed.     Procedures      Lab Results   Component Value Date    WBC 6.73 07/14/2017    HGB 14.9 07/14/2017    HCT 44.7 07/14/2017    MCV 92.2 07/14/2017     07/14/2017     Lab Results   Component Value Date    GLUCOSE 85 08/03/2017    BUN 12 08/03/2017    CREATININE 0.67 08/03/2017    EGFRIFNONA 87 08/03/2017    BCR 17.9 08/03/2017    CO2 22.0 08/03/2017    CALCIUM 9.8 08/03/2017    ALBUMIN 4.00 08/03/2017    LABIL2 1.4 08/03/2017    AST 29 08/03/2017    ALT 42 08/03/2017     Lab Results   Component Value Date    CHOL 218 (H) 07/14/2017     Lab Results   Component Value Date    TRIG 283 (H) 07/14/2017    TRIG 214 (H) 07/26/2016    TRIG 221 (H) 07/27/2015     Lab Results   Component Value Date    HDL 50 (L) 07/14/2017    HDL 53 (L) 07/26/2016     Lab Results   Component Value Date    LDLCALC 106 07/26/2016    LDLCALC 116 07/27/2015    LDLCALC 126 07/18/2014     No results found for: LDL  No results found for: HDLLDLRATIO  No components found for:  CHOLHDL  Lab Results   Component Value Date    HGBA1C 5.6 07/27/2015     Lab Results   Component Value Date    TSH 0.530 08/03/2017           ASSESSMENT AND PLAN  Mrs. Larios is doing well from a cardiac standpoint and no further cardiac workup is indicated at this time.  She has been reassured.  Metoprolol succinate has been continued along with low-dose aspirin.  She will watch her diet closely to optimize her lipid profile.  She will continue follow-up with Dr. Shah and we'll be glad to see her on a yearly basis.    Diagnoses and all orders for this visit:    Mixed hyperlipidemia    Essential hypertension        Peggy Govea MD  8/23/2017  12:13 PM

## 2017-08-24 PROBLEM — M81.0 OSTEOPOROSIS: Status: ACTIVE | Noted: 2017-08-24

## 2017-09-18 ENCOUNTER — TELEPHONE (OUTPATIENT)
Dept: FAMILY MEDICINE CLINIC | Facility: CLINIC | Age: 72
End: 2017-09-18

## 2017-09-26 ENCOUNTER — TELEPHONE (OUTPATIENT)
Dept: FAMILY MEDICINE CLINIC | Facility: CLINIC | Age: 72
End: 2017-09-26

## 2017-10-04 ENCOUNTER — OFFICE VISIT (OUTPATIENT)
Dept: FAMILY MEDICINE CLINIC | Facility: CLINIC | Age: 72
End: 2017-10-04

## 2017-10-04 VITALS
DIASTOLIC BLOOD PRESSURE: 62 MMHG | HEIGHT: 63 IN | HEART RATE: 99 BPM | BODY MASS INDEX: 28.14 KG/M2 | SYSTOLIC BLOOD PRESSURE: 138 MMHG | OXYGEN SATURATION: 99 % | WEIGHT: 158.8 LBS

## 2017-10-04 DIAGNOSIS — Z23 NEED FOR VACCINATION: ICD-10-CM

## 2017-10-04 DIAGNOSIS — Z00.00 MEDICARE ANNUAL WELLNESS VISIT, INITIAL: Primary | ICD-10-CM

## 2017-10-04 DIAGNOSIS — E78.2 MIXED HYPERLIPIDEMIA: Chronic | ICD-10-CM

## 2017-10-04 DIAGNOSIS — I10 ESSENTIAL HYPERTENSION: Chronic | ICD-10-CM

## 2017-10-04 DIAGNOSIS — M81.0 AGE-RELATED OSTEOPOROSIS WITHOUT CURRENT PATHOLOGICAL FRACTURE: ICD-10-CM

## 2017-10-04 PROCEDURE — G0008 ADMIN INFLUENZA VIRUS VAC: HCPCS | Performed by: GENERAL PRACTICE

## 2017-10-04 PROCEDURE — 90662 IIV NO PRSV INCREASED AG IM: CPT | Performed by: GENERAL PRACTICE

## 2017-10-04 PROCEDURE — G0438 PPPS, INITIAL VISIT: HCPCS | Performed by: GENERAL PRACTICE

## 2017-10-04 RX ORDER — DOXYCYCLINE HYCLATE 20 MG
20 TABLET ORAL DAILY
COMMUNITY
End: 2018-11-29

## 2017-10-04 NOTE — PATIENT INSTRUCTIONS
Medicare Wellness  Personal Prevention Plan of Service     Date of Office Visit:  10/04/2017  Encounter Provider:  Ida Shah MD  Place of Service:  [facility]  Patient Name: Robert Larios  :  1945    As part of the Medicare Wellness portion of your visit today, we are providing you with this personalized preventive plan of services (PPPS). This plan is based upon recommendations of the [place] Preventive Services Task Force (USPSTF) and the Advisory Committee on Immunization Practices (ACIP).    This lists the preventive care services that should be considered, and provides dates of when you are due. Items listed as completed are up-to-date and do not require any further intervention.    Health Maintenance   Topic Date Due   • TDAP/TD VACCINES (2 - Td) 2018   • LIPID PANEL  2018   • MEDICARE ANNUAL WELLNESS  10/04/2018   • MAMMOGRAM  2019   • DXA SCAN  2019   • COLONOSCOPY  2024   • HEPATITIS C SCREENING  Completed   • INFLUENZA VACCINE  Completed   • PNEUMOCOCCAL VACCINES (65+ LOW/MEDIUM RISK)  Completed   • ZOSTER VACCINE  Addressed       No orders of the defined types were placed in this encounter.      Return in about 10 months (around 2018) for Annual physical.

## 2017-10-04 NOTE — PROGRESS NOTES
QUICK REFERENCE INFORMATION:  The ABCs of the Annual Wellness Visit    Initial Medicare Wellness Visit    HEALTH RISK ASSESSMENT    1945    Recent Hospitalizations:  No hospitalization(s) within the last year..        Current Medical Providers:  Patient Care Team:  Ida Shah MD as PCP - General (Family Medicine)  Gabo Fitzgerald MD as PCP - Claims Attributed  Everardo Beltran DO as Consulting Physician (Gastroenterology)  Naveed Kim MD as Consulting Physician (Ophthalmology)  Peggy Govea MD as Consulting Physician (Cardiology)        Smoking Status:  History   Smoking Status   • Never Smoker   Smokeless Tobacco   • Never Used       Alcohol Consumption:  History   Alcohol Use No       Depression Screen:   PHQ-2/PHQ-9 Depression Screening 10/4/2017   Little interest or pleasure in doing things 0   Feeling down, depressed, or hopeless 0   Trouble falling or staying asleep, or sleeping too much 0   Feeling tired or having little energy 0   Poor appetite or overeating 0   Feeling bad about yourself - or that you are a failure or have let yourself or your family down 0   Trouble concentrating on things, such as reading the newspaper or watching television 0   Moving or speaking so slowly that other people could have noticed. Or the opposite - being so fidgety or restless that you have been moving around a lot more than usual 0   Thoughts that you would be better off dead, or of hurting yourself in some way 0   Total Score 0       Health Habits and Functional and Cognitive Screening:  Functional & Cognitive Status 10/4/2017   Do you have difficulty preparing food and eating? No   Do you have difficulty bathing yourself? No   Do you have difficulty getting dressed? No   Do you have difficulty using the toilet? No   Do you have difficulty moving around from place to place? No   In the past year have you fallen or experienced a near fall? No   Do you need help using the phone?  No    Are you deaf or do you have serious difficulty hearing?  No   Do you need help with transportation? No   Do you need help shopping? No   Do you need help preparing meals?  No   Do you need help with housework?  No   Do you need help with laundry? No   Do you need help taking your medications? No   Do you need help managing money? No   Do you have difficulty concentrating, remembering or making decisions? No       Health Habits  Current Diet: Well Balanced Diet  Dental Exam: Up to date  Eye Exam: Up to date  Exercise (times per week): 3 times per week          Does the patient have evidence of cognitive impairment? No    Asiprin use counseling: Taking ASA appropriately as indicated      Recent Lab Results:    Visual Acuity:  No exam data present    Age-appropriate Screening Schedule:  Refer to the list below for future screening recommendations based on patient's age, sex and/or medical conditions. Orders for these recommended tests are listed in the plan section. The patient has been provided with a written plan.    Health Maintenance   Topic Date Due   • TDAP/TD VACCINES (2 - Td) 07/11/2018   • LIPID PANEL  07/14/2018   • MAMMOGRAM  08/03/2019   • DXA SCAN  08/03/2019   • COLONOSCOPY  08/28/2024   • INFLUENZA VACCINE  Completed   • PNEUMOCOCCAL VACCINES (65+ LOW/MEDIUM RISK)  Completed   • ZOSTER VACCINE  Addressed        Subjective   History of Present Illness    Robert Larios is a 72 y.o. female who presents for an Annual Wellness Visit.    The following portions of the patient's history were reviewed and updated as appropriate: allergies, current medications, past family history, past medical history, past social history, past surgical history and problem list.    Outpatient Medications Prior to Visit   Medication Sig Dispense Refill   • aluminum hydroxide-mag carbonate (GAVISCON EXTRA RELIEF) 160-105 MG chewable tablet chewable tablet Chew Daily.     • aspirin 81 MG EC tablet Take 81 mg by mouth Daily.      • Calcium Carbonate-Vit D-Min (CALTRATE PLUS PO)      • Coenzyme Q10 150 MG capsule      • esomeprazole (nexIUM) 20 MG capsule Take 20 mg by mouth 2 (Two) Times a Day.     • folic acid (FOLVITE) 400 MCG tablet      • loperamide (IMODIUM) 2 MG capsule Take 2 mg by mouth As Needed for diarrhea (2 times per week).     • metoprolol succinate XL (TOPROL-XL) 25 MG 24 hr tablet Take 1 tablet by mouth Daily. 30 tablet 11   • Multiple Vitamins-Minerals (HAIR SKIN AND NAILS FORMULA) tablet Take  by mouth.     • NON FORMULARY 30mg vitamin B-6  400 mcg vitamin B-12  350 mg DHA  150 mg EPA  200 mg L-Carnitine  50mg CO-Q 10  30 mg trans-resveratrol     • Omega-3 350 MG capsule delayed-release      • Unable to find 1 each 1 (One) Time. Bone Restore     • vitamin E 400 UNIT capsule Take 400 Units by mouth Daily.       No facility-administered medications prior to visit.        Patient Active Problem List   Diagnosis   • Hyperlipidemia   • GERD (gastroesophageal reflux disease)   • Essential hypertension   • Degenerative joint disease involving multiple joints   • Constipation   • Nuclear cataract   • Episcleritis   • Corneal ulcer, marginal   • Palpitation   • Abnormal EKG   • Dizziness   • Precordial pain   • Osteoporosis       Advance Care Planning:  power of  for healthcare on file, has an advance directive - a copy has been provided and is in file    Identification of Risk Factors:  Risk factors include: weight , unhealthy diet, inactivity and increased fall risk.    Review of Systems    Compared to one year ago, the patient feels her physical health is the same.  Compared to one year ago, the patient feels her mental health is better.  Objective     Physical Exam   Constitutional: She is oriented to person, place, and time. She appears well-developed and well-nourished. No distress.   HENT:   Head: Normocephalic and atraumatic.   Nose: Nose normal.   Mouth/Throat: Oropharynx is clear and moist.   Eyes: Conjunctivae  "and EOM are normal. Pupils are equal, round, and reactive to light. Right eye exhibits no discharge. Left eye exhibits no discharge.   Neck: No thyromegaly present.   Cardiovascular: Normal rate, regular rhythm, normal heart sounds and intact distal pulses.    Pulmonary/Chest: Effort normal and breath sounds normal.   Lymphadenopathy:     She has no cervical adenopathy.   Neurological: She is alert and oriented to person, place, and time.   Skin: Skin is warm and dry.   Psychiatric: She has a normal mood and affect.   Nursing note and vitals reviewed.      Vitals:    10/04/17 0803 10/04/17 0823   BP: 140/78 138/62   BP Location: Left arm    Patient Position: Sitting    Cuff Size: Adult    Pulse: 99    SpO2: 99%    Weight: 158 lb 12.8 oz (72 kg)    Height: 63\" (160 cm)    PainSc:   4    PainLoc: Neck        Body mass index is 28.13 kg/(m^2).  Discussed the patient's BMI with her. The BMI is above average; BMI management plan is completed.    Assessment/Plan   Patient Self-Management and Personalized Health Advice  The patient has been provided with information about: diet, exercise, weight management and fall prevention and preventive services including:   · Exercise counseling provided, Fall Risk assessment done, Fall Risk plan of care done, Influenza vaccine.    Visit Diagnoses:    ICD-10-CM ICD-9-CM   1. Medicare annual wellness visit, initial Z00.00 V70.0   2. Need for vaccination Z23 V05.9   3. Mixed hyperlipidemia E78.2 272.2   4. Essential hypertension I10 401.9   5. Age-related osteoporosis without current pathological fracture M81.0 733.01       Orders Placed This Encounter   Procedures   • Flu Vaccine High Dose PF 65YR+   • Comprehensive Metabolic Panel     Standing Status:   Future     Standing Expiration Date:   11/8/2018   • Lipid Panel     Standing Status:   Future     Standing Expiration Date:   11/8/2018   • Vitamin D 25 Hydroxy     Standing Status:   Future     Standing Expiration Date:   11/8/2018 "   • Urinalysis With Microscopic - Urine, Clean Catch     Standing Status:   Future     Standing Expiration Date:   11/8/2018       Outpatient Encounter Prescriptions as of 10/4/2017   Medication Sig Dispense Refill   • aluminum hydroxide-mag carbonate (GAVISCON EXTRA RELIEF) 160-105 MG chewable tablet chewable tablet Chew Daily.     • aspirin 81 MG EC tablet Take 81 mg by mouth Daily.     • Calcium Carbonate-Vit D-Min (CALTRATE PLUS PO)      • Cholecalciferol (VITAMIN D3) 5000 units capsule capsule Take 4,000 Units by mouth Daily.     • Coenzyme Q10 150 MG capsule      • doxycycline (PERIOSTAT) 20 MG tablet Take 20 mg by mouth Daily.     • esomeprazole (nexIUM) 20 MG capsule Take 20 mg by mouth 2 (Two) Times a Day.     • folic acid (FOLVITE) 400 MCG tablet      • loperamide (IMODIUM) 2 MG capsule Take 2 mg by mouth As Needed for diarrhea (2 times per week).     • metoprolol succinate XL (TOPROL-XL) 25 MG 24 hr tablet Take 1 tablet by mouth Daily. 30 tablet 11   • Multiple Vitamins-Minerals (HAIR SKIN AND NAILS FORMULA) tablet Take  by mouth.     • NON FORMULARY 30mg vitamin B-6  400 mcg vitamin B-12  350 mg DHA  150 mg EPA  200 mg L-Carnitine  50mg CO-Q 10  30 mg trans-resveratrol     • Omega-3 350 MG capsule delayed-release      • Unable to find 1 each 1 (One) Time. Bone Restore     • vitamin E 400 UNIT capsule Take 400 Units by mouth Daily.       No facility-administered encounter medications on file as of 10/4/2017.        Reviewed use of high risk medication in the elderly: not applicable  Reviewed for potential of harmful drug interactions in the elderly: not applicable    Follow Up:  Return in about 10 months (around 8/4/2018) for Annual physical.     An After Visit Summary and PPPS with all of these plans were given to the patient.   Information has been scanned into chart.  Discussed importance of taking medications as prescribed. Encouraged healthy eating habits with low fat, low salt choices and working  towards maintaining a healthy weight. Recommended regular exercise if able as well as care to prevent falls.

## 2017-10-20 ENCOUNTER — TELEPHONE (OUTPATIENT)
Dept: FAMILY MEDICINE CLINIC | Facility: CLINIC | Age: 72
End: 2017-10-20

## 2017-10-27 ENCOUNTER — INFUSION (OUTPATIENT)
Dept: ONCOLOGY | Facility: HOSPITAL | Age: 72
End: 2017-10-27

## 2017-10-27 DIAGNOSIS — M15.9 OSTEOARTHRITIS OF MULTIPLE JOINTS, UNSPECIFIED OSTEOARTHRITIS TYPE: Primary | ICD-10-CM

## 2017-10-27 DIAGNOSIS — M81.0 AGE-RELATED OSTEOPOROSIS WITHOUT CURRENT PATHOLOGICAL FRACTURE: ICD-10-CM

## 2017-10-27 LAB
ALBUMIN SERPL-MCNC: 4.3 G/DL (ref 3.4–4.8)
ALBUMIN/GLOB SERPL: 1.3 G/DL (ref 1.1–1.8)
ALP SERPL-CCNC: 56 U/L (ref 38–126)
ALT SERPL W P-5'-P-CCNC: 50 U/L (ref 9–52)
ANION GAP SERPL CALCULATED.3IONS-SCNC: 14 MMOL/L (ref 5–15)
AST SERPL-CCNC: 37 U/L (ref 14–36)
BILIRUB SERPL-MCNC: 0.6 MG/DL (ref 0.2–1.3)
BUN BLD-MCNC: 13 MG/DL (ref 7–21)
BUN/CREAT SERPL: 15.9 (ref 7–25)
CALCIUM SPEC-SCNC: 9.7 MG/DL (ref 8.4–10.2)
CHLORIDE SERPL-SCNC: 105 MMOL/L (ref 95–110)
CO2 SERPL-SCNC: 24 MMOL/L (ref 22–31)
CREAT BLD-MCNC: 0.82 MG/DL (ref 0.5–1)
GFR SERPL CREATININE-BSD FRML MDRD: 69 ML/MIN/1.73 (ref 39–90)
GLOBULIN UR ELPH-MCNC: 3.2 GM/DL (ref 2.3–3.5)
GLUCOSE BLD-MCNC: 110 MG/DL (ref 60–100)
MAGNESIUM SERPL-MCNC: 2.3 MG/DL (ref 1.6–2.3)
PHOSPHATE SERPL-MCNC: 3.3 MG/DL (ref 2.4–4.4)
POTASSIUM BLD-SCNC: 4.3 MMOL/L (ref 3.5–5.1)
PROT SERPL-MCNC: 7.5 G/DL (ref 6.3–8.6)
SODIUM BLD-SCNC: 143 MMOL/L (ref 137–145)

## 2017-10-27 PROCEDURE — 25010000002 DENOSUMAB 60 MG/ML SOLUTION: Performed by: GENERAL PRACTICE

## 2017-10-27 PROCEDURE — 84100 ASSAY OF PHOSPHORUS: CPT

## 2017-10-27 PROCEDURE — 36415 COLL VENOUS BLD VENIPUNCTURE: CPT | Performed by: HOSPITALIST

## 2017-10-27 PROCEDURE — 80053 COMPREHEN METABOLIC PANEL: CPT

## 2017-10-27 PROCEDURE — 83735 ASSAY OF MAGNESIUM: CPT

## 2017-10-27 PROCEDURE — 96372 THER/PROPH/DIAG INJ SC/IM: CPT | Performed by: HOSPITALIST

## 2017-10-27 RX ADMIN — DENOSUMAB 60 MG: 60 INJECTION SUBCUTANEOUS at 10:44

## 2017-11-21 ENCOUNTER — TELEPHONE (OUTPATIENT)
Dept: FAMILY MEDICINE CLINIC | Facility: CLINIC | Age: 72
End: 2017-11-21

## 2018-02-13 ENCOUNTER — CLINICAL SUPPORT (OUTPATIENT)
Dept: AUDIOLOGY | Facility: CLINIC | Age: 73
End: 2018-02-13

## 2018-02-13 ENCOUNTER — OFFICE VISIT (OUTPATIENT)
Dept: OTOLARYNGOLOGY | Facility: CLINIC | Age: 73
End: 2018-02-13

## 2018-02-13 VITALS
DIASTOLIC BLOOD PRESSURE: 80 MMHG | WEIGHT: 161.8 LBS | HEIGHT: 63 IN | BODY MASS INDEX: 28.67 KG/M2 | SYSTOLIC BLOOD PRESSURE: 132 MMHG

## 2018-02-13 DIAGNOSIS — H69.83 DYSFUNCTION OF BOTH EUSTACHIAN TUBES: Primary | ICD-10-CM

## 2018-02-13 DIAGNOSIS — Z01.10 ENCOUNTER FOR EXAMINATION OF HEARING WITHOUT ABNORMAL FINDINGS: Primary | ICD-10-CM

## 2018-02-13 PROCEDURE — 99203 OFFICE O/P NEW LOW 30 MIN: CPT | Performed by: OTOLARYNGOLOGY

## 2018-02-13 NOTE — PROGRESS NOTES
TYMPANOMETRY ONLY      Name:  Robert Larios  :  1945  Age:  72 y.o.  Date of Evaluation:  2018      HISTORY    Reason for visit:  Robert Larios is seen today for a hearing evaluation at the request of Dr. Yeyo Mar.  Patient reports needing to have the pressure test completed.      RESULTS        Otoscopy and Tympanometry 226 Hz :  Right Ear:  Otoscopy:  Clear ear canal          Tympanometry:  Middle ear function within normal limits    Left Ear:   Otoscopy:  Clear ear canal        Tympanometry:  Middle ear function within normal limits    IMPRESSIONS:  1.  Tympanometry results are consistent with Middle ear function within normal limits in both ears.      RECOMMENDATIONS:  Patient is seeing the Ear Nose and Throat physician immediately following this examination.  It was a pleasure seeing Robert Larios in Audiology today.  We would be happy to do further testing or discuss these test as necessary.          This document has been electronically signed by ERIKA Parsons on 2018 1:55 PM          ERIKA Parsons  Licensed Audiologist

## 2018-02-15 NOTE — PROGRESS NOTES
Subjective   Robert Larios is a 72 y.o. female.     History of Present Illness   Patient states she is here because she was to get her ears checked before an upcoming flight.  She describes multiple episodes of ear problems related to flying particularly upon descent.  Says she's had several different episodes of ear infection that were a result of flying.  States she is already tried using Afrin, Claritin, and nasal steroids in the past and still had problems.  Has not been treated for ear problems previously.  No otorrhea.  No previous otologic surgery.      The following portions of the patient's history were reviewed and updated as appropriate: allergies, current medications, past family history, past medical history, past social history, past surgical history and problem list.      Robert Larios reports that she has never smoked. She has never used smokeless tobacco. She reports that she does not drink alcohol or use illicit drugs.  Patient is not a tobacco user and has not been counseled for use of tobacco products    Family History   Problem Relation Age of Onset   • Thyroid disease Mother    • Hypertension Mother    • Obesity Mother    • Osteoporosis Mother    • Stroke Mother    • Prostate cancer Father    • Cancer Father    • Diabetes Father    • Heart attack Father    • Hypertension Father    • Cancer Other    • Diabetes Other    • Heart disease Other    • Stroke Other    • Thyroid disease Other    • Arthritis Maternal Grandmother    • Kidney disease Maternal Grandmother    • Stroke Paternal Grandmother        Allergies   Allergen Reactions   • Adhesive Tape      Blistering of Skin   • Darvon [Propoxyphene] Nausea And Vomiting   • Bactrim [Sulfamethoxazole-Trimethoprim] Rash   • Macrodantin [Nitrofurantoin Macrocrystal]      Drug Fever   • Sulfa Antibiotics Rash         Current Outpatient Prescriptions:   •  aluminum hydroxide-mag carbonate (GAVISCON EXTRA RELIEF) 160-105 MG chewable tablet chewable  tablet, Chew Daily., Disp: , Rfl:   •  aspirin 81 MG EC tablet, Take 81 mg by mouth Daily., Disp: , Rfl:   •  Calcium Carbonate-Vit D-Min (CALTRATE PLUS PO), , Disp: , Rfl:   •  Cholecalciferol (VITAMIN D3) 5000 units capsule capsule, Take 4,000 Units by mouth Daily., Disp: , Rfl:   •  Coenzyme Q10 150 MG capsule, , Disp: , Rfl:   •  doxycycline (PERIOSTAT) 20 MG tablet, Take 20 mg by mouth Daily., Disp: , Rfl:   •  esomeprazole (nexIUM) 20 MG capsule, Take 20 mg by mouth 2 (Two) Times a Day., Disp: , Rfl:   •  folic acid (FOLVITE) 400 MCG tablet, , Disp: , Rfl:   •  loperamide (IMODIUM) 2 MG capsule, Take 2 mg by mouth As Needed for diarrhea (2 times per week)., Disp: , Rfl:   •  metoprolol succinate XL (TOPROL-XL) 25 MG 24 hr tablet, Take 1 tablet by mouth Daily., Disp: 30 tablet, Rfl: 11  •  Multiple Vitamins-Minerals (HAIR SKIN AND NAILS FORMULA) tablet, Take  by mouth., Disp: , Rfl:   •  NON FORMULARY, 30mg vitamin B-6 400 mcg vitamin B-12 350 mg  mg  mg L-Carnitine 50mg CO-Q 10 30 mg trans-resveratrol, Disp: , Rfl:   •  Omega-3 350 MG capsule delayed-release, , Disp: , Rfl:   •  Unable to find, 1 each 1 (One) Time. Bone Restore, Disp: , Rfl:   •  vitamin E 400 UNIT capsule, Take 400 Units by mouth Daily., Disp: , Rfl:     Past Medical History:   Diagnosis Date   • Acute otitis media 09/10/2015   • Acute pharyngitis 09/10/2015   • Acute sinusitis 09/10/2015   • Biliary colic 10/17/2014    STONES BY REVIEW US   • Change in bowel habits 06/28/2016   • Chest rales 9/262015    MEDIUM   • Cholelithiasis without obstruction 10/17/2014   • Chronic cholecystitis 10/17/2014   • Constipation 02/26/2015   • Degenerative joint disease involving multiple joints 09/10/2014   • Elevated blood-pressure reading without diagnosis of hypertension 05/31/2012    BLOOD PRESSURE NORMAL AT HOME   • Epigastric pain 09/10/2014   • Essential hypertension 07/30/2015   • Fibrocystic breast    • Gastritis 02/26/2015   • GERD  (gastroesophageal reflux disease) 01/28/2015   • Hammer toe 12/02/2015   • History of colonoscopy 08/28/2014    Information taken from [facility]    • Hyperlipidemia 07/30/2015   • Ingrowing nail [id number]   • Irritable bowel disease    • Pain in right foot 11/11/2015   • Peripheral neuropathy 09/10/2014         Review of Systems   Gastrointestinal: Positive for abdominal pain and diarrhea.   Genitourinary:        Nocturia; incontinence   Skin:        Hair loss   All other systems reviewed and are negative.          Objective   Physical Exam  General: Well-developed well-nourished female in no acute distress.  Alert and oriented ×3. Head: Normocephalic. Face: Symmetrical strength and appearance. PERRL. EOMI. Voice:Strong. Speech:Fluent  Ears: External ears no deformity, canals no discharge, tympanic membranes intact clear and mobile bilaterally.  Nose: Nares show no discharge mass polyp or purulence.  Boggy mucosa is present.  No gross external deformity.  Septum: Midline  Oral cavity: Lips and gums without lesions.  Tongue and floor of mouth without lesions.  Parotid and submandibular ducts unobstructed.  No mucosal lesions on the buccal mucosa or vestibule of the mouth.  Pharynx: No erythema exudate mass or ulcer  Neck: No lymphadenopathy.  No thyromegaly.  Trachea and larynx midline.  No masses in the parotid or submandibular glands.    Tympanograms are obtained and are type A bilaterally today.        Assessment/Plan   Robert was seen today for ear problem.    Diagnoses and all orders for this visit:    Dysfunction of both eustachian tubes      Plan: History is consistent with intermittent eustachian tube dysfunction and barotrauma.  Advised the patient that no medical regimen is absolutely guaranteed to avoid barotrauma short of myringotomy but with no evidence of middle ear effusion and type A tympanograms I would not recommend prophylactic surgical treatment at this point.  I would advise beginning the week prior to  flying for her to use Flonase 2 sprays each nostril daily and then on the day of the flight only to use Afrin and over-the-counter Sudafed PE.  Advised her to attempt to equalize air pressure frequently while on the flight by using Valsalva maneuver.  She is to return after her flight if she experiences any significant problems and I'll reevaluate her at that time.

## 2018-04-30 ENCOUNTER — INFUSION (OUTPATIENT)
Dept: ONCOLOGY | Facility: HOSPITAL | Age: 73
End: 2018-04-30

## 2018-04-30 DIAGNOSIS — M81.0 AGE-RELATED OSTEOPOROSIS WITHOUT CURRENT PATHOLOGICAL FRACTURE: Primary | ICD-10-CM

## 2018-04-30 LAB
ALBUMIN SERPL-MCNC: 4.1 G/DL (ref 3.4–4.8)
ALBUMIN/GLOB SERPL: 1.4 G/DL (ref 1.1–1.8)
ALP SERPL-CCNC: 42 U/L (ref 38–126)
ALT SERPL W P-5'-P-CCNC: 60 U/L (ref 9–52)
ANION GAP SERPL CALCULATED.3IONS-SCNC: 13 MMOL/L (ref 5–15)
AST SERPL-CCNC: 37 U/L (ref 14–36)
BILIRUB SERPL-MCNC: 0.6 MG/DL (ref 0.2–1.3)
BUN BLD-MCNC: 13 MG/DL (ref 7–21)
BUN/CREAT SERPL: 17.8 (ref 7–25)
CALCIUM SPEC-SCNC: 9.6 MG/DL (ref 8.4–10.2)
CHLORIDE SERPL-SCNC: 102 MMOL/L (ref 95–110)
CO2 SERPL-SCNC: 27 MMOL/L (ref 22–31)
CREAT BLD-MCNC: 0.73 MG/DL (ref 0.5–1)
GFR SERPL CREATININE-BSD FRML MDRD: 79 ML/MIN/1.73 (ref 60–90)
GLOBULIN UR ELPH-MCNC: 3 GM/DL (ref 2.3–3.5)
GLUCOSE BLD-MCNC: 97 MG/DL (ref 60–100)
MAGNESIUM SERPL-MCNC: 2.2 MG/DL (ref 1.6–2.3)
PHOSPHATE SERPL-MCNC: 2.9 MG/DL (ref 2.4–4.4)
POTASSIUM BLD-SCNC: 4.1 MMOL/L (ref 3.5–5.1)
PROT SERPL-MCNC: 7.1 G/DL (ref 6.3–8.6)
SODIUM BLD-SCNC: 142 MMOL/L (ref 137–145)

## 2018-04-30 PROCEDURE — 84100 ASSAY OF PHOSPHORUS: CPT | Performed by: GENERAL PRACTICE

## 2018-04-30 PROCEDURE — 96372 THER/PROPH/DIAG INJ SC/IM: CPT | Performed by: NURSE PRACTITIONER

## 2018-04-30 PROCEDURE — 83735 ASSAY OF MAGNESIUM: CPT | Performed by: GENERAL PRACTICE

## 2018-04-30 PROCEDURE — 25010000002 DENOSUMAB 60 MG/ML SOLUTION: Performed by: GENERAL PRACTICE

## 2018-04-30 PROCEDURE — 80053 COMPREHEN METABOLIC PANEL: CPT | Performed by: GENERAL PRACTICE

## 2018-04-30 PROCEDURE — 36415 COLL VENOUS BLD VENIPUNCTURE: CPT | Performed by: NURSE PRACTITIONER

## 2018-04-30 RX ADMIN — DENOSUMAB 60 MG: 60 INJECTION SUBCUTANEOUS at 08:10

## 2018-07-02 RX ORDER — METOPROLOL SUCCINATE 25 MG/1
25 TABLET, EXTENDED RELEASE ORAL DAILY
Qty: 30 TABLET | Refills: 5 | Status: SHIPPED | OUTPATIENT
Start: 2018-07-02 | End: 2018-12-21 | Stop reason: SDUPTHER

## 2018-09-19 DIAGNOSIS — E78.2 MIXED HYPERLIPIDEMIA: Chronic | ICD-10-CM

## 2018-09-19 DIAGNOSIS — I10 ESSENTIAL HYPERTENSION: Primary | Chronic | ICD-10-CM

## 2018-09-19 DIAGNOSIS — M81.0 AGE-RELATED OSTEOPOROSIS WITHOUT CURRENT PATHOLOGICAL FRACTURE: ICD-10-CM

## 2018-09-20 DIAGNOSIS — Z12.31 ENCOUNTER FOR SCREENING MAMMOGRAM FOR MALIGNANT NEOPLASM OF BREAST: Primary | ICD-10-CM

## 2018-10-11 ENCOUNTER — TELEPHONE (OUTPATIENT)
Dept: FAMILY MEDICINE CLINIC | Facility: CLINIC | Age: 73
End: 2018-10-11

## 2018-10-11 ENCOUNTER — LAB (OUTPATIENT)
Dept: LAB | Facility: HOSPITAL | Age: 73
End: 2018-10-11

## 2018-10-11 DIAGNOSIS — E78.2 MIXED HYPERLIPIDEMIA: ICD-10-CM

## 2018-10-11 DIAGNOSIS — M81.0 AGE-RELATED OSTEOPOROSIS WITHOUT CURRENT PATHOLOGICAL FRACTURE: ICD-10-CM

## 2018-10-11 DIAGNOSIS — M81.0 AGE-RELATED OSTEOPOROSIS WITHOUT CURRENT PATHOLOGICAL FRACTURE: Primary | ICD-10-CM

## 2018-10-11 DIAGNOSIS — I10 ESSENTIAL HYPERTENSION: ICD-10-CM

## 2018-10-11 LAB
25(OH)D3 SERPL-MCNC: 58.4 NG/ML (ref 30–100)
ALBUMIN SERPL-MCNC: 4.3 G/DL (ref 3.4–4.8)
ALBUMIN/GLOB SERPL: 1.3 G/DL (ref 1.1–1.8)
ALP SERPL-CCNC: 56 U/L (ref 38–126)
ALT SERPL W P-5'-P-CCNC: 41 U/L (ref 9–52)
ANION GAP SERPL CALCULATED.3IONS-SCNC: 12 MMOL/L (ref 5–15)
ARTICHOKE IGE QN: 99 MG/DL (ref 1–129)
AST SERPL-CCNC: 55 U/L (ref 14–36)
BACTERIA UR QL AUTO: ABNORMAL /HPF
BILIRUB SERPL-MCNC: 0.7 MG/DL (ref 0.2–1.3)
BILIRUB UR QL STRIP: NEGATIVE
BUN BLD-MCNC: 14 MG/DL (ref 7–21)
BUN/CREAT SERPL: 20.6 (ref 7–25)
CALCIUM SPEC-SCNC: 9.5 MG/DL (ref 8.4–10.2)
CHLORIDE SERPL-SCNC: 101 MMOL/L (ref 95–110)
CHOLEST SERPL-MCNC: 208 MG/DL (ref 0–199)
CLARITY UR: CLEAR
CO2 SERPL-SCNC: 25 MMOL/L (ref 22–31)
COLOR UR: YELLOW
CREAT BLD-MCNC: 0.68 MG/DL (ref 0.5–1)
GFR SERPL CREATININE-BSD FRML MDRD: 85 ML/MIN/1.73 (ref 39–90)
GLOBULIN UR ELPH-MCNC: 3.4 GM/DL (ref 2.3–3.5)
GLUCOSE BLD-MCNC: 98 MG/DL (ref 60–100)
GLUCOSE UR STRIP-MCNC: NEGATIVE MG/DL
HDLC SERPL-MCNC: 62 MG/DL (ref 60–200)
HGB UR QL STRIP.AUTO: NEGATIVE
HYALINE CASTS UR QL AUTO: ABNORMAL /LPF
KETONES UR QL STRIP: NEGATIVE
LDLC/HDLC SERPL: 1.82 {RATIO} (ref 0–3.22)
LEUKOCYTE ESTERASE UR QL STRIP.AUTO: NEGATIVE
MAGNESIUM SERPL-MCNC: 2.4 MG/DL (ref 1.6–2.3)
NITRITE UR QL STRIP: NEGATIVE
PH UR STRIP.AUTO: 7 [PH] (ref 5–9)
PHOSPHATE SERPL-MCNC: 3.4 MG/DL (ref 2.4–4.4)
POTASSIUM BLD-SCNC: 4 MMOL/L (ref 3.5–5.1)
PROT SERPL-MCNC: 7.7 G/DL (ref 6.3–8.6)
PROT UR QL STRIP: NEGATIVE
RBC # UR: ABNORMAL /HPF
REF LAB TEST METHOD: ABNORMAL
SODIUM BLD-SCNC: 138 MMOL/L (ref 137–145)
SP GR UR STRIP: 1.01 (ref 1–1.03)
SQUAMOUS #/AREA URNS HPF: ABNORMAL /HPF
TRIGL SERPL-MCNC: 165 MG/DL (ref 20–199)
UROBILINOGEN UR QL STRIP: NORMAL
WBC UR QL AUTO: ABNORMAL /HPF

## 2018-10-11 PROCEDURE — 36415 COLL VENOUS BLD VENIPUNCTURE: CPT

## 2018-10-11 PROCEDURE — 83735 ASSAY OF MAGNESIUM: CPT

## 2018-10-11 PROCEDURE — 80061 LIPID PANEL: CPT

## 2018-10-11 PROCEDURE — 81001 URINALYSIS AUTO W/SCOPE: CPT

## 2018-10-11 PROCEDURE — 82306 VITAMIN D 25 HYDROXY: CPT

## 2018-10-11 PROCEDURE — 80053 COMPREHEN METABOLIC PANEL: CPT

## 2018-10-11 PROCEDURE — 84100 ASSAY OF PHOSPHORUS: CPT

## 2018-10-15 ENCOUNTER — OFFICE VISIT (OUTPATIENT)
Dept: FAMILY MEDICINE CLINIC | Facility: CLINIC | Age: 73
End: 2018-10-15

## 2018-10-15 VITALS
OXYGEN SATURATION: 96 % | HEIGHT: 63 IN | DIASTOLIC BLOOD PRESSURE: 60 MMHG | BODY MASS INDEX: 28.69 KG/M2 | WEIGHT: 161.9 LBS | SYSTOLIC BLOOD PRESSURE: 142 MMHG | HEART RATE: 105 BPM

## 2018-10-15 DIAGNOSIS — I10 ESSENTIAL HYPERTENSION: Chronic | ICD-10-CM

## 2018-10-15 DIAGNOSIS — Z23 NEED FOR IMMUNIZATION AGAINST INFLUENZA: ICD-10-CM

## 2018-10-15 DIAGNOSIS — E78.2 MIXED HYPERLIPIDEMIA: Chronic | ICD-10-CM

## 2018-10-15 DIAGNOSIS — Z12.31 ENCOUNTER FOR SCREENING MAMMOGRAM FOR MALIGNANT NEOPLASM OF BREAST: ICD-10-CM

## 2018-10-15 DIAGNOSIS — Z00.00 MEDICARE ANNUAL WELLNESS VISIT, SUBSEQUENT: Primary | ICD-10-CM

## 2018-10-15 PROCEDURE — G0008 ADMIN INFLUENZA VIRUS VAC: HCPCS | Performed by: GENERAL PRACTICE

## 2018-10-15 PROCEDURE — G0439 PPPS, SUBSEQ VISIT: HCPCS | Performed by: GENERAL PRACTICE

## 2018-10-15 PROCEDURE — 99213 OFFICE O/P EST LOW 20 MIN: CPT | Performed by: GENERAL PRACTICE

## 2018-10-15 PROCEDURE — 90662 IIV NO PRSV INCREASED AG IM: CPT | Performed by: GENERAL PRACTICE

## 2018-10-15 NOTE — PROGRESS NOTES
QUICK REFERENCE INFORMATION:  The ABCs of the Annual Wellness Visit    Subsequent Medicare Wellness Visit    HEALTH RISK ASSESSMENT    1945    Recent Hospitalizations:  No hospitalization(s) within the last year..        Current Medical Providers:  Patient Care Team:  Ida Shah MD as PCP - General (Family Medicine)  Everardo Beltran DO as PCP - Claims Attributed  Everardo Beltran DO as Consulting Physician (Gastroenterology)  Naveed Kim MD as Consulting Physician (Ophthalmology)  Peggy Govea MD as Consulting Physician (Cardiology)        Smoking Status:  History   Smoking Status   • Never Smoker   Smokeless Tobacco   • Never Used       Alcohol Consumption:  History   Alcohol Use No       Depression Screen:   PHQ-2/PHQ-9 Depression Screening 10/15/2018   Little interest or pleasure in doing things 0   Feeling down, depressed, or hopeless 0   Trouble falling or staying asleep, or sleeping too much 1   Feeling tired or having little energy 0   Poor appetite or overeating 0   Feeling bad about yourself - or that you are a failure or have let yourself or your family down 0   Trouble concentrating on things, such as reading the newspaper or watching television 0   Moving or speaking so slowly that other people could have noticed. Or the opposite - being so fidgety or restless that you have been moving around a lot more than usual 0   Thoughts that you would be better off dead, or of hurting yourself in some way 0   Total Score 1   If you checked off any problems, how difficult have these problems made it for you to do your work, take care of things at home, or get along with other people? Somewhat difficult       Health Habits and Functional and Cognitive Screening:  Functional & Cognitive Status 10/15/2018   Do you have difficulty preparing food and eating? No   Do you have difficulty bathing yourself, getting dressed or grooming yourself? No   Do you have difficulty using  the toilet? No   Do you have difficulty moving around from place to place? No   Do you have trouble with steps or getting out of a bed or a chair? No   In the past year have you fallen or experienced a near fall? No   Current Diet Well Balanced Diet   Dental Exam Up to date   Eye Exam Up to date   Exercise (times per week) 3 times per week   Current Exercise Activities Include Walking   Do you need help using the phone?  No   Are you deaf or do you have serious difficulty hearing?  No   Do you need help with transportation? No   Do you need help shopping? No   Do you need help preparing meals?  No   Do you need help with housework?  No   Do you need help with laundry? No   Do you need help taking your medications? No   Do you need help managing money? No   Do you ever drive or ride in a car without wearing a seat belt? No   Have you felt unusual stress, anger or loneliness in the last month? No   Who do you live with? Spouse   If you need help, do you have trouble finding someone available to you? No   Have you been bothered in the last four weeks by sexual problems? No   Do you have difficulty concentrating, remembering or making decisions? Yes           Does the patient have evidence of cognitive impairment? No    Aspirin use counseling: Taking ASA appropriately as indicated      Recent Lab Results:  CMP:  Lab Results   Component Value Date    BUN 14 10/11/2018    CREATININE 0.68 10/11/2018    EGFRIFNONA 85 10/11/2018    BCR 20.6 10/11/2018     10/11/2018    K 4.0 10/11/2018    CO2 25.0 10/11/2018    CALCIUM 9.5 10/11/2018    ALBUMIN 4.30 10/11/2018    BILITOT 0.7 10/11/2018    ALKPHOS 56 10/11/2018    AST 55 (H) 10/11/2018    ALT 41 10/11/2018     Lipid Panel:  Lab Results   Component Value Date    CHOL 208 (H) 10/11/2018    TRIG 165 10/11/2018    HDL 62 10/11/2018    LDLHDL 1.82 10/11/2018     HbA1c:  Lab Results   Component Value Date    HGBA1C 5.6 07/27/2015       Visual Acuity:  No exam data  present    Age-appropriate Screening Schedule:  Refer to the list below for future screening recommendations based on patient's age, sex and/or medical conditions. Orders for these recommended tests are listed in the plan section. The patient has been provided with a written plan.    Health Maintenance   Topic Date Due   • ZOSTER VACCINE (2 of 2) 09/28/2017   • TDAP/TD VACCINES (2 - Td) 07/11/2018   • DXA SCAN  08/03/2019   • LIPID PANEL  10/11/2019   • MAMMOGRAM  10/15/2020   • COLONOSCOPY  08/28/2024   • INFLUENZA VACCINE  Completed   • PNEUMOCOCCAL VACCINES (65+ LOW/MEDIUM RISK)  Completed        Subjective   History of Present Illness    Robert Larios is a 73 y.o. female who presents for an Subsequent Wellness Visit.    The following portions of the patient's history were reviewed and updated as appropriate: allergies, current medications, past family history, past medical history, past social history, past surgical history and problem list.    Outpatient Medications Prior to Visit   Medication Sig Dispense Refill   • aluminum hydroxide-mag carbonate (GAVISCON EXTRA RELIEF) 160-105 MG chewable tablet chewable tablet Chew Daily.     • aspirin 81 MG EC tablet Take 81 mg by mouth Daily.     • Calcium Carbonate-Vit D-Min (CALTRATE PLUS PO)      • Cholecalciferol (VITAMIN D3) 5000 units capsule capsule Take 4,000 Units by mouth Daily.     • Coenzyme Q10 150 MG capsule      • doxycycline (PERIOSTAT) 20 MG tablet Take 20 mg by mouth Daily.     • esomeprazole (nexIUM) 20 MG capsule Take 20 mg by mouth 2 (Two) Times a Day.     • folic acid (FOLVITE) 400 MCG tablet      • loperamide (IMODIUM) 2 MG capsule Take 2 mg by mouth As Needed for diarrhea (2 times per week).     • metoprolol succinate XL (TOPROL-XL) 25 MG 24 hr tablet Take 1 tablet by mouth Daily. 30 tablet 5   • Multiple Vitamins-Minerals (HAIR SKIN AND NAILS FORMULA) tablet Take  by mouth.     • NON FORMULARY 30mg vitamin B-6  400 mcg vitamin B-12  350 mg  DHA  150 mg EPA  200 mg L-Carnitine  50mg CO-Q 10  30 mg trans-resveratrol     • Omega-3 350 MG capsule delayed-release      • Unable to find 1 each 1 (One) Time. Bone Restore     • vitamin E 400 UNIT capsule Take 400 Units by mouth Daily.       No facility-administered medications prior to visit.        Patient Active Problem List   Diagnosis   • Hyperlipidemia   • GERD (gastroesophageal reflux disease)   • Essential hypertension   • Degenerative joint disease involving multiple joints   • Constipation   • Nuclear cataract   • Episcleritis   • Corneal ulcer, marginal   • Palpitation   • Abnormal EKG   • Dizziness   • Precordial pain   • Osteoporosis       Advance Care Planning:  power of  for healthcare on file, has an advance directive - a copy has been provided and is in file    Identification of Risk Factors:  Risk factors include: weight , unhealthy diet, inactivity and increased fall risk.    Review of Systems    Compared to one year ago, the patient feels her physical health is the same.  Compared to one year ago, the patient feels her mental health is the same.    Objective     Physical Exam   Constitutional: She is oriented to person, place, and time. She appears well-developed and well-nourished. No distress.   HENT:   Head: Normocephalic and atraumatic.   Nose: Nose normal.   Mouth/Throat: Oropharynx is clear and moist.   Eyes: Pupils are equal, round, and reactive to light. Conjunctivae and EOM are normal. Right eye exhibits no discharge. Left eye exhibits no discharge.   Neck: No thyromegaly present.   Cardiovascular: Normal rate, regular rhythm, normal heart sounds and intact distal pulses.    Pulmonary/Chest: Effort normal and breath sounds normal.   Lymphadenopathy:     She has no cervical adenopathy.   Neurological: She is alert and oriented to person, place, and time.   Skin: Skin is warm and dry.   Psychiatric: She has a normal mood and affect.   Nursing note and vitals  "reviewed.      Vitals:    10/15/18 1459   BP: 142/60   Pulse: 105   SpO2: 96%   Weight: 73.4 kg (161 lb 14.4 oz)   Height: 160 cm (63\")   PainSc: 0-No pain       Patient's Body mass index is 28.68 kg/m². BMI is above normal parameters. Recommendations include: exercise counseling and nutrition counseling.      Assessment/Plan   Patient Self-Management and Personalized Health Advice  The patient has been provided with information about: diet, exercise, weight management and fall prevention and preventive services including:   · Exercise counseling provided, Fall Risk assessment done, Fall Risk plan of care done, Influenza vaccine, Zostavax vaccine (Herpes Zoster).    Visit Diagnoses:    ICD-10-CM ICD-9-CM   1. Medicare annual wellness visit, subsequent Z00.00 V70.0   2. Need for immunization against influenza Z23 V04.81   3. Essential hypertension I10 401.9   4. Mixed hyperlipidemia E78.2 272.2   5. Encounter for screening mammogram for malignant neoplasm of breast Z12.31 V76.12       Orders Placed This Encounter   Procedures   • Flu Vaccine High Dose PF 65YR+ ([id number])   • Comprehensive Metabolic Panel     Standing Status:   Future     Standing Expiration Date:   11/29/2019   • LDL Cholesterol, Direct     Standing Status:   Future     Standing Expiration Date:   11/29/2019   • Urinalysis With Microscopic - Urine, Clean Catch     Standing Status:   Future     Standing Expiration Date:   11/29/2019       Outpatient Encounter Prescriptions as of 10/15/2018   Medication Sig Dispense Refill   • aluminum hydroxide-mag carbonate (GAVISCON EXTRA RELIEF) 160-105 MG chewable tablet chewable tablet Chew Daily.     • aspirin 81 MG EC tablet Take 81 mg by mouth Daily.     • Calcium Carbonate-Vit D-Min (CALTRATE PLUS PO)      • Cholecalciferol (VITAMIN D3) 5000 units capsule capsule Take 4,000 Units by mouth Daily.     • Coenzyme Q10 150 MG capsule      • doxycycline (PERIOSTAT) 20 MG tablet Take 20 mg by mouth Daily.     • " esomeprazole (nexIUM) 20 MG capsule Take 20 mg by mouth 2 (Two) Times a Day.     • folic acid (FOLVITE) 400 MCG tablet      • loperamide (IMODIUM) 2 MG capsule Take 2 mg by mouth As Needed for diarrhea (2 times per week).     • metoprolol succinate XL (TOPROL-XL) 25 MG 24 hr tablet Take 1 tablet by mouth Daily. 30 tablet 5   • Multiple Vitamins-Minerals (HAIR SKIN AND NAILS FORMULA) tablet Take  by mouth.     • NON FORMULARY 30mg vitamin B-6  400 mcg vitamin B-12  350 mg DHA  150 mg EPA  200 mg L-Carnitine  50mg CO-Q 10  30 mg trans-resveratrol     • Omega-3 350 MG capsule delayed-release      • Unable to find 1 each 1 (One) Time. Bone Restore     • [DISCONTINUED] vitamin E 400 UNIT capsule Take 400 Units by mouth Daily.       No facility-administered encounter medications on file as of 10/15/2018.        Reviewed use of high risk medication in the elderly: not applicable  Reviewed for potential of harmful drug interactions in the elderly: not applicable    Follow Up:  Return in about 1 year (around 10/15/2019) for Annual physical.     An After Visit Summary and PPPS with all of these plans were given to the patient.    Information has been scanned into chart.  Discussed importance of taking medications as prescribed. Encouraged healthy eating habits with low fat, low salt choices and working towards maintaining a healthy weight. Recommended regular exercise if able as well as care to prevent falls.

## 2018-10-15 NOTE — PATIENT INSTRUCTIONS
Check at pharmacy on Shingrix shingles vaccine     Check on tetanus/pertussis vaccine at pharmacy or with insurance.      Medicare Wellness  Personal Prevention Plan of Service     Date of Office Visit:  10/15/2018  Encounter Provider:  Ida Shah MD  Place of Service:  [facility]  Patient Name: Robert Larios  :  1945    As part of the Medicare Wellness portion of your visit today, we are providing you with this personalized preventive plan of services (PPPS). This plan is based upon recommendations of the [place] Preventive Services Task Force (USPSTF) and the Advisory Committee on Immunization Practices (ACIP).    This lists the preventive care services that should be considered, and provides dates of when you are due. Items listed as completed are up-to-date and do not require any further intervention.    Health Maintenance   Topic Date Due   • PNEUMOCOCCAL VACCINES (65+ LOW/MEDIUM RISK) (2 of 2 - PCV13) 2013   • ZOSTER VACCINE (2 of 2) 2017   • TDAP/TD VACCINES (2 - Td) 2018   • INFLUENZA VACCINE  2018   • MEDICARE ANNUAL WELLNESS  10/04/2018   • MAMMOGRAM  2019   • DXA SCAN  2019   • LIPID PANEL  10/11/2019   • COLONOSCOPY  2024   • HEPATITIS C SCREENING  Completed       No orders of the defined types were placed in this encounter.      No Follow-up on file.

## 2018-10-15 NOTE — PROGRESS NOTES
Subjective   Robert Larios is a 73 y.o. female.     Chief Complaint   Patient presents with   • Annual Exam     labs [facility] medicare wellness   • Hypertension     For review and evaluation of management of chronic medical problems. Labs reviewed. Due for mammogram.    Hypertension   This is a chronic problem. The current episode started more than 1 year ago. The problem is unchanged (white coat syndrome). The problem is controlled. Associated symptoms include anxiety. Pertinent negatives include no chest pain, headaches, neck pain, palpitations or shortness of breath. There are no associated agents to hypertension. Current antihypertension treatment includes beta blockers. The current treatment provides significant improvement. There are no compliance problems.    Hyperlipidemia   This is a chronic problem. The current episode started more than 1 year ago. The problem is controlled. Recent lipid tests were reviewed and are high (minimally elevated). There are no known factors aggravating her hyperlipidemia. Pertinent negatives include no chest pain, myalgias or shortness of breath. Current antihyperlipidemic treatment includes diet change. The current treatment provides significant improvement of lipids.        The following portions of the patient's history were reviewed and updated as appropriate: allergies, current medications, past family and social history and problem list.    Outpatient Medications Prior to Visit   Medication Sig Dispense Refill   • aluminum hydroxide-mag carbonate (GAVISCON EXTRA RELIEF) 160-105 MG chewable tablet chewable tablet Chew Daily.     • aspirin 81 MG EC tablet Take 81 mg by mouth Daily.     • Calcium Carbonate-Vit D-Min (CALTRATE PLUS PO)      • Cholecalciferol (VITAMIN D3) 5000 units capsule capsule Take 4,000 Units by mouth Daily.     • Coenzyme Q10 150 MG capsule      • doxycycline (PERIOSTAT) 20 MG tablet Take 20 mg by mouth Daily.     • esomeprazole (nexIUM) 20 MG capsule  "Take 20 mg by mouth 2 (Two) Times a Day.     • folic acid (FOLVITE) 400 MCG tablet      • loperamide (IMODIUM) 2 MG capsule Take 2 mg by mouth As Needed for diarrhea (2 times per week).     • metoprolol succinate XL (TOPROL-XL) 25 MG 24 hr tablet Take 1 tablet by mouth Daily. 30 tablet 5   • Multiple Vitamins-Minerals (HAIR SKIN AND NAILS FORMULA) tablet Take  by mouth.     • NON FORMULARY 30mg vitamin B-6  400 mcg vitamin B-12  350 mg DHA  150 mg EPA  200 mg L-Carnitine  50mg CO-Q 10  30 mg trans-resveratrol     • Omega-3 350 MG capsule delayed-release      • Unable to find 1 each 1 (One) Time. [facility]     • vitamin E 400 UNIT capsule Take 400 Units by mouth Daily.       No facility-administered medications prior to visit.        Review of Systems   Constitutional: Negative.  Negative for chills, fatigue, fever and unexpected weight change.   HENT: Negative.  Negative for congestion, ear pain, hearing loss, nosebleeds, rhinorrhea, sneezing, sore throat and tinnitus.    Eyes: Negative.  Negative for discharge.   Respiratory: Negative.  Negative for cough, shortness of breath and wheezing.    Cardiovascular: Negative.  Negative for chest pain and palpitations.   Gastrointestinal: Negative.  Negative for abdominal pain, constipation, diarrhea, nausea and vomiting.   Endocrine: Negative.    Genitourinary: Negative.  Negative for dysuria, frequency and urgency.   Musculoskeletal: Negative.  Negative for arthralgias, back pain, joint swelling, myalgias and neck pain.   Skin: Negative.  Negative for rash.   Allergic/Immunologic: Negative.    Neurological: Negative.  Negative for dizziness, weakness, numbness and headaches.   Hematological: Negative.  Negative for adenopathy.   Psychiatric/Behavioral: Negative.  Negative for dysphoric mood and sleep disturbance. The patient is not nervous/anxious.        Objective     Visit Vitals  /60   Pulse 105   Ht 160 cm (63\")   Wt 73.4 kg (161 lb 14.4 oz)   SpO2 96%   BMI " 28.68 kg/m²     Physical Exam   Constitutional: She is oriented to person, place, and time. She appears well-developed and well-nourished. No distress.   HENT:   Head: Normocephalic and atraumatic.   Nose: Nose normal.   Mouth/Throat: Oropharynx is clear and moist.   Eyes: Pupils are equal, round, and reactive to light. Conjunctivae and EOM are normal. Right eye exhibits no discharge. Left eye exhibits no discharge.   Neck: No tracheal deviation present. No thyromegaly present.   Cardiovascular: Normal rate, regular rhythm, normal heart sounds and intact distal pulses.    No murmur heard.  Pulmonary/Chest: Effort normal and breath sounds normal. No respiratory distress. She has no wheezes. She has no rales. She exhibits no tenderness. Right breast exhibits no inverted nipple, no mass, no nipple discharge, no skin change and no tenderness. Left breast exhibits no inverted nipple, no mass, no nipple discharge, no skin change and no tenderness.   Abdominal: Soft. Bowel sounds are normal. She exhibits no distension and no mass. There is no tenderness. No hernia.   Musculoskeletal: Normal range of motion. She exhibits no deformity.   Lymphadenopathy:     She has no cervical adenopathy.   Neurological: She is alert and oriented to person, place, and time. She has normal reflexes.   Skin: Skin is warm and dry.   Psychiatric: She has a normal mood and affect. Her behavior is normal. Judgment and thought content normal.   Nursing note and vitals reviewed.    Results for orders placed or performed in visit on 10/11/18   Comprehensive Metabolic Panel   Result Value Ref Range    Glucose 98 60 - 100 mg/dL    BUN 14 7 - 21 mg/dL    Creatinine 0.68 0.50 - 1.00 mg/dL    Sodium 138 137 - 145 mmol/L    Potassium 4.0 3.5 - 5.1 mmol/L    Chloride 101 95 - 110 mmol/L    CO2 25.0 22.0 - 31.0 mmol/L    Calcium 9.5 8.4 - 10.2 mg/dL    Total Protein 7.7 6.3 - 8.6 g/dL    Albumin 4.30 3.40 - 4.80 g/dL    ALT (SGPT) 41 9 - 52 U/L    AST  (SGOT) 55 (H) 14 - 36 U/L    Alkaline Phosphatase 56 38 - 126 U/L    Total Bilirubin 0.7 0.2 - 1.3 mg/dL    eGFR Non African Amer 85 39 - 90 mL/min/1.73    Globulin 3.4 2.3 - 3.5 gm/dL    A/G Ratio 1.3 1.1 - 1.8 g/dL    BUN/Creatinine Ratio 20.6 7.0 - 25.0    Anion Gap 12.0 5.0 - 15.0 mmol/L   Lipid Panel   Result Value Ref Range    Total Cholesterol 208 (H) 0 - 199 mg/dL    Triglycerides 165 20 - 199 mg/dL    HDL Cholesterol 62 60 - 200 mg/dL    LDL Cholesterol  99 1 - 129 mg/dL    LDL/HDL Ratio 1.82 0.00 - 3.22   Vitamin D 25 Hydroxy   Result Value Ref Range    25 Hydroxy, Vitamin D 58.4 30.0 - 100.0 ng/ml   Magnesium   Result Value Ref Range    Magnesium 2.4 (H) 1.6 - 2.3 mg/dL   Phosphorus   Result Value Ref Range    Phosphorus 3.4 2.4 - 4.4 mg/dL   Urinalysis without microscopic (no culture) - Urine, Clean Catch   Result Value Ref Range    Color, UA Yellow Yellow, Straw, Dark Yellow, Ashley    Appearance, UA Clear Clear    pH, UA 7.0 5.0 - 9.0    Specific Gravity, UA 1.013 1.003 - 1.030    Glucose, UA Negative Negative    Ketones, UA Negative Negative    Bilirubin, UA Negative Negative    Blood, UA Negative Negative    Protein, UA Negative Negative    Leuk Esterase, UA Negative Negative    Nitrite, UA Negative Negative    Urobilinogen, UA 0.2 E.U./dL 0.2 - 1.0 E.U./dL   Urinalysis, Microscopic Only - Urine, Clean Catch   Result Value Ref Range    RBC, UA 0-2 (A) None Seen /HPF    WBC, UA 0-2 None Seen, 0-2, 3-5 /HPF    Bacteria, UA None Seen None Seen /HPF    Squamous Epithelial Cells, UA None Seen None Seen, 0-2 /HPF    Hyaline Casts, UA 0-2 None Seen /LPF    Methodology Automated Microscopy       Assessment/Plan   Problem List Items Addressed This Visit        Cardiovascular and Mediastinum    Hyperlipidemia (Chronic)    Essential hypertension (Chronic)    Relevant Orders    Comprehensive Metabolic Panel    LDL Cholesterol, Direct    Urinalysis With Microscopic - Urine, Clean Catch      Other Visit Diagnoses      Medicare annual wellness visit, subsequent    -  Primary    Need for immunization against influenza        Relevant Orders    Flu Vaccine High Dose PF 65YR+ ([id number]) (Completed)    Encounter for screening mammogram for malignant neoplasm of breast              Will notify regarding results.     No orders of the defined types were placed in this encounter.    Return in about 1 year (around 10/15/2019) for Annual physical.

## 2018-10-18 DIAGNOSIS — Z12.31 ENCOUNTER FOR SCREENING MAMMOGRAM FOR MALIGNANT NEOPLASM OF BREAST: ICD-10-CM

## 2018-10-18 DIAGNOSIS — Z78.0 POST-MENOPAUSAL: Primary | ICD-10-CM

## 2018-10-31 ENCOUNTER — INFUSION (OUTPATIENT)
Dept: ONCOLOGY | Facility: HOSPITAL | Age: 73
End: 2018-10-31

## 2018-10-31 DIAGNOSIS — M81.0 AGE-RELATED OSTEOPOROSIS WITHOUT CURRENT PATHOLOGICAL FRACTURE: Primary | ICD-10-CM

## 2018-10-31 PROCEDURE — 25010000002 DENOSUMAB 60 MG/ML SOLUTION: Performed by: GENERAL PRACTICE

## 2018-10-31 PROCEDURE — 96372 THER/PROPH/DIAG INJ SC/IM: CPT | Performed by: NURSE PRACTITIONER

## 2018-10-31 RX ADMIN — DENOSUMAB 60 MG: 60 INJECTION SUBCUTANEOUS at 08:33

## 2018-12-06 ENCOUNTER — ANESTHESIA EVENT (OUTPATIENT)
Dept: GASTROENTEROLOGY | Facility: HOSPITAL | Age: 73
End: 2018-12-06

## 2018-12-06 ENCOUNTER — ANESTHESIA (OUTPATIENT)
Dept: GASTROENTEROLOGY | Facility: HOSPITAL | Age: 73
End: 2018-12-06

## 2018-12-06 ENCOUNTER — HOSPITAL ENCOUNTER (OUTPATIENT)
Facility: HOSPITAL | Age: 73
Setting detail: HOSPITAL OUTPATIENT SURGERY
Discharge: HOME | End: 2018-12-06
Attending: INTERNAL MEDICINE | Admitting: INTERNAL MEDICINE

## 2018-12-06 VITALS
RESPIRATION RATE: 18 BRPM | HEART RATE: 92 BPM | SYSTOLIC BLOOD PRESSURE: 137 MMHG | HEIGHT: 63 IN | DIASTOLIC BLOOD PRESSURE: 63 MMHG | OXYGEN SATURATION: 96 % | TEMPERATURE: 98.2 F | BODY MASS INDEX: 27.77 KG/M2 | WEIGHT: 156.75 LBS

## 2018-12-06 DIAGNOSIS — R19.7 DIARRHEA: ICD-10-CM

## 2018-12-06 PROCEDURE — 25010000002 PROPOFOL 10 MG/ML EMULSION: Performed by: NURSE ANESTHETIST, CERTIFIED REGISTERED

## 2018-12-06 PROCEDURE — 88305 TISSUE EXAM BY PATHOLOGIST: CPT | Performed by: PATHOLOGY

## 2018-12-06 PROCEDURE — 88305 TISSUE EXAM BY PATHOLOGIST: CPT | Performed by: INTERNAL MEDICINE

## 2018-12-06 PROCEDURE — 25010000002 FENTANYL CITRATE (PF) 100 MCG/2ML SOLUTION: Performed by: NURSE ANESTHETIST, CERTIFIED REGISTERED

## 2018-12-06 RX ORDER — FENTANYL CITRATE 50 UG/ML
INJECTION, SOLUTION INTRAMUSCULAR; INTRAVENOUS AS NEEDED
Status: DISCONTINUED | OUTPATIENT
Start: 2018-12-06 | End: 2018-12-06 | Stop reason: SURG

## 2018-12-06 RX ORDER — LIDOCAINE HYDROCHLORIDE 10 MG/ML
INJECTION, SOLUTION INFILTRATION; PERINEURAL AS NEEDED
Status: DISCONTINUED | OUTPATIENT
Start: 2018-12-06 | End: 2018-12-06 | Stop reason: SURG

## 2018-12-06 RX ORDER — DEXTROSE AND SODIUM CHLORIDE 5; .45 G/100ML; G/100ML
20 INJECTION, SOLUTION INTRAVENOUS CONTINUOUS
Status: DISCONTINUED | OUTPATIENT
Start: 2018-12-06 | End: 2018-12-06 | Stop reason: HOSPADM

## 2018-12-06 RX ORDER — PROPOFOL 10 MG/ML
VIAL (ML) INTRAVENOUS AS NEEDED
Status: DISCONTINUED | OUTPATIENT
Start: 2018-12-06 | End: 2018-12-06 | Stop reason: SURG

## 2018-12-06 RX ADMIN — PROPOFOL 80 MG: 10 INJECTION, EMULSION INTRAVENOUS at 09:25

## 2018-12-06 RX ADMIN — PROPOFOL 30 MG: 10 INJECTION, EMULSION INTRAVENOUS at 09:33

## 2018-12-06 RX ADMIN — DEXTROSE AND SODIUM CHLORIDE 20 ML/HR: 5; 450 INJECTION, SOLUTION INTRAVENOUS at 08:59

## 2018-12-06 RX ADMIN — FENTANYL CITRATE 50 MCG: 50 INJECTION, SOLUTION INTRAMUSCULAR; INTRAVENOUS at 09:30

## 2018-12-06 RX ADMIN — LIDOCAINE HYDROCHLORIDE 60 MG: 10 INJECTION, SOLUTION INFILTRATION; PERINEURAL at 09:25

## 2018-12-06 RX ADMIN — PROPOFOL 30 MG: 10 INJECTION, EMULSION INTRAVENOUS at 09:28

## 2018-12-06 NOTE — H&P
Lorena Burger DO,[facility]  Gastroenterology  Hepatology  Endoscopy  Board Certified in Internal Medicine and gastroenterology  [address], [address]  [place]. [address]  - [phone]   F - [phone]     GASTROENTEROLOGY HISTORY AND PHYSICAL  NOTE   LOREAN BURGER DO.         SUBJECTIVE:   12/6/2018    Name: Robert Larios  DOD: 1945        Chief Complaint:       Subjective : Diarrhea, progressive.  Not improving with conservative medical treatment     Patient is 73 y.o. female presents with desire for elective colonoscopy with biopsies .      ROS/HISTORY/ CURRENT MEDICATIONS/OBJECTIVE/VS/PE:   Review of Systems:  All systems unremarkable unless specified below.  Constitutional   HENT  Eyes   Respiratory    Cardiovascular  Gastrointestinal   Endocrine  Genitourinary    Musculoskeletal   Skin  Allergic/Immunologic    Neurological    Hematological  Psychiatric/Behavioral    History:     Past Medical History:   Diagnosis Date   • Acute otitis media 09/10/2015   • Acute pharyngitis 09/10/2015   • Acute sinusitis 09/10/2015   • Biliary colic 10/17/2014    STONES BY REVIEW US   • Change in bowel habits 06/28/2016   • Chest rales 9/262015    MEDIUM   • Cholelithiasis without obstruction 10/17/2014   • Chronic cholecystitis 10/17/2014   • Constipation 02/26/2015   • Degenerative joint disease involving multiple joints 09/10/2014   • Elevated blood-pressure reading without diagnosis of hypertension 05/31/2012    BLOOD PRESSURE NORMAL AT HOME   • Epigastric pain 09/10/2014   • Essential hypertension 07/30/2015   • Fibrocystic breast    • Gastritis 02/26/2015   • GERD (gastroesophageal reflux disease) 01/28/2015   • Hammer toe 12/02/2015   • History of colonoscopy 08/28/2014    Information taken from [facility]    • Hyperlipidemia 07/30/2015   • Ingrowing nail [id number]   • Irritable bowel disease    • Pain in right foot 11/11/2015   • Peripheral neuropathy 09/10/2014     Past Surgical History:    Procedure Laterality Date   • BREAST BIOPSY  11/22/2004    R mammotome biop w/ image guided placement of metallic localization clip & stereotactic location & guidance for breast biop. radiologic examination of the surgical specimen & unilateral mammography   • BREAST CYST ASPIRATION     • CHOLECYSTECTOMY  10/09/2014   • COLONOSCOPY  08/28/2014    Hemorrhoids found.   • DILATATION AND CURETTAGE  08/28/2014    Postmenopausal bleeding   • ENDOSCOPY  08/28/2014    Normal hypopharynx, esophagus. A hiatus hernia found in the GE junction.Non-erosive gastritis found inthe stomach. Multiple biopsies taken.Moderate amount of bile sained fluid in the stomach.Normal, symmetrical, and patent pylorus.Normal duodenum.   • INJECTION OF MEDICATION  07/30/2015    KENALOG(1)   • TONSILLECTOMY       Family History   Problem Relation Age of Onset   • Thyroid disease Mother    • Hypertension Mother    • Obesity Mother    • Osteoporosis Mother    • Stroke Mother    • Prostate cancer Father    • Cancer Father    • Diabetes Father    • Heart attack Father    • Hypertension Father    • Heart disease Father    • Obesity Father    • Cancer Other    • Diabetes Other    • Heart disease Other    • Stroke Other    • Thyroid disease Other    • Arthritis Maternal Grandmother    • Kidney disease Maternal Grandmother    • Stroke Paternal Grandmother      Social History     Tobacco Use   • Smoking status: Never Smoker   • Smokeless tobacco: Never Used   Substance Use Topics   • Alcohol use: No   • Drug use: No     Medications Prior to Admission   Medication Sig Dispense Refill Last Dose   • aluminum hydroxide-mag carbonate (GAVISCON EXTRA RELIEF) 160-105 MG chewable tablet chewable tablet Chew Daily.   12/5/2018 at Unknown time   • Calcium Carbonate-Vit D-Min (CALTRATE PLUS PO) Take 1 tablet by mouth 2 (Two) Times a Day.   12/5/2018 at Unknown time   • Cholecalciferol (VITAMIN D3) 5000 units capsule capsule Take 4,000 Units by mouth Daily.    12/5/2018 at Unknown time   • esomeprazole (nexIUM) 20 MG capsule Take 20 mg by mouth 2 (Two) Times a Day.   12/6/2018 at Unknown time   • folic acid (FOLVITE) 400 MCG tablet Take 400 mcg by mouth Daily.   12/5/2018 at Unknown time   • loperamide (IMODIUM) 2 MG capsule Take 2 mg by mouth As Needed for diarrhea (2 times per week).   Past Week at Unknown time   • metoprolol succinate XL (TOPROL-XL) 25 MG 24 hr tablet Take 1 tablet by mouth Daily. 30 tablet 5 12/6/2018 at Unknown time   • NON FORMULARY 30mg vitamin B-6  400 mcg vitamin B-12  350 mg DHA  150 mg EPA  200 mg L-Carnitine  50mg CO-Q 10  30 mg trans-resveratrol   12/5/2018 at Unknown time   • aspirin 81 MG EC tablet Take 81 mg by mouth 2 (Two) Times a Week.   11/2/2018   • Omega-3 350 MG capsule delayed-release    12/3/2018     Allergies:  Adhesive tape; Darvon [propoxyphene]; Bactrim [sulfamethoxazole-trimethoprim]; Macrodantin [nitrofurantoin macrocrystal]; and Sulfa antibiotics    I have reviewed the patients medical history, surgical history and family history in the available medical record system.     Current Medications:     Current Facility-Administered Medications   Medication Dose Route Frequency Provider Last Rate Last Dose   • dextrose 5 % and sodium chloride 0.45 % infusion  20 mL/hr Intravenous Continuous Everardo Beltran DO 20 mL/hr at 12/06/18 0859 20 mL/hr at 12/06/18 0859       Objective     Physical Exam:   Temp:  [97.5 °F (36.4 °C)] 97.5 °F (36.4 °C)  Heart Rate:  [110] 110  Resp:  [16] 16  BP: (154)/(74) 154/74    Physical Exam:  General Appearance:    Alert, cooperative, in no acute distress   Head:    Normocephalic, without obvious abnormality, atraumatic   Eyes:            Lids and lashes normal, conjunctivae and sclerae normal, no   icterus, no pallor, corneas clear, PERRLA   Ears:    Ears appear intact with no abnormalities noted   Throat:   No oral lesions, no thrush, oral mucosa moist   Neck:   No adenopathy, supple, trachea  midline, no thyromegaly, no     carotid bruit, no JVD   Back:     No kyphosis present, no scoliosis present, no skin lesions,       erythema or scars, no tenderness to percussion or                   palpation,   range of motion normal   Lungs:     Clear to auscultation,respirations regular, even and                   unlabored    Heart:    Regular rhythm and normal rate, normal S1 and S2, no            murmur, no gallop, no rub, no click   Breast Exam:    Deferred   Abdomen:     Normal bowel sounds, no masses, no organomegaly, soft        non-tender, non-distended, no guarding, no rebound                 tenderness   Genitalia:    Deferred   Extremities:   Moves all extremities well, no edema, no cyanosis, no              redness   Pulses:   Pulses palpable and equal bilaterally   Skin:   No bleeding, bruising or rash   Lymph nodes:   No palpable adenopathy   Neurologic:   Cranial nerves 2 - 12 grossly intact, sensation intact, DTR        present and equal bilaterally      Results Review:     Lab Results   Component Value Date    WBC 6.73 07/14/2017    WBC 5.9 12/04/2015    WBC 10.9 (H) 07/12/2014    HGB 14.9 07/14/2017    HGB 14.0 12/04/2015    HGB 14.3 07/12/2014    HCT 44.7 07/14/2017    HCT 42.6 12/04/2015    HCT 42.9 07/12/2014     07/14/2017     12/04/2015     07/12/2014             No results found for: LIPASE  No results found for: INR       Radiology Review:  Imaging Results (last 72 hours)     ** No results found for the last 72 hours. **           I reviewed the patient's new clinical results.  I reviewed the patient's new imaging results and agree with the interpretation.     ASSESSMENT/PLAN:   ASSESSMENT:   1.  Diarrhea, functional.  Rule out microscopic colitis    PLAN:   1.  Colonoscopy with biopsies    Risk and benefits associated with the procedure are reviewed with the patient.  The patient wishes to proceed      Everardo Beltran DO  12/06/18  9:13 AM

## 2018-12-06 NOTE — ANESTHESIA PREPROCEDURE EVALUATION
Anesthesia Evaluation     no history of anesthetic complications:  NPO Solid Status: > 8 hours  NPO Liquid Status: > 2 hours           Airway   Mallampati: III  TM distance: <3 FB  Neck ROM: full  Possible difficult intubation  Dental - normal exam     Pulmonary - negative pulmonary ROS and normal exam   Cardiovascular - normal exam    (+) hypertension less than 2 medications, hyperlipidemia,       Neuro/Psych  (+) dizziness/light headedness, numbness (peripheral neuropathy),     GI/Hepatic/Renal/Endo    (+)  GERD,      Musculoskeletal     Abdominal    Substance History      OB/GYN          Other   (+) arthritis                     Anesthesia Plan    ASA 2     MAC     intravenous induction   Anesthetic plan, all risks, benefits, and alternatives have been provided, discussed and informed consent has been obtained with: patient.

## 2018-12-06 NOTE — DISCHARGE INSTR - APPOINTMENTS
Everardo Beltran  [address]  [place] [address]  #[phone]      Appointment  January 9th at 8:15 am.

## 2018-12-06 NOTE — ANESTHESIA POSTPROCEDURE EVALUATION
Patient: Robert Larios    Procedure Summary     Date:  12/06/18 Room / Location:  [facility] ENDOSCOPY 2 / [facility] ENDOSCOPY    Anesthesia Start:  0921 Anesthesia Stop:  0937    Procedure:  COLONOSCOPY (N/A ) Diagnosis:       Diarrhea      (Diarrhea [R19.7])    Surgeon:  Everardo Beltran DO Provider:  Nereyda Richards CRNA    Anesthesia Type:  MAC ASA Status:  2          Anesthesia Type: MAC  Last vitals  BP   154/74 (12/06/18 0846)   Temp   97.5 °F (36.4 °C) (12/06/18 0846)   Pulse   110 (12/06/18 0846)   Resp   16 (12/06/18 0846)     SpO2   98 % (12/06/18 0846)     Post Anesthesia Care and Evaluation    Patient location during evaluation: bedside  Patient participation: complete - patient participated  Level of consciousness: sleepy but conscious  Pain management: adequate  Airway patency: patent  Anesthetic complications: No anesthetic complications  PONV Status: none  Cardiovascular status: acceptable  Respiratory status: acceptable  Hydration status: acceptable

## 2018-12-07 LAB
LAB AP CASE REPORT: NORMAL
PATH REPORT.FINAL DX SPEC: NORMAL
PATH REPORT.GROSS SPEC: NORMAL

## 2018-12-21 RX ORDER — METOPROLOL SUCCINATE 25 MG/1
25 TABLET, EXTENDED RELEASE ORAL DAILY
Qty: 30 TABLET | Refills: 5 | Status: SHIPPED | OUTPATIENT
Start: 2018-12-21 | End: 2019-06-21 | Stop reason: SDUPTHER

## 2019-01-28 ENCOUNTER — OFFICE VISIT (OUTPATIENT)
Dept: PODIATRY | Facility: CLINIC | Age: 74
End: 2019-01-28

## 2019-01-28 VITALS — BODY MASS INDEX: 27.82 KG/M2 | HEART RATE: 111 BPM | OXYGEN SATURATION: 98 % | HEIGHT: 63 IN | WEIGHT: 157 LBS

## 2019-01-28 DIAGNOSIS — M79.671 RIGHT FOOT PAIN: Primary | ICD-10-CM

## 2019-01-28 DIAGNOSIS — L60.0 INGROWN TOENAIL: ICD-10-CM

## 2019-01-28 PROCEDURE — 11750 EXCISION NAIL&NAIL MATRIX: CPT | Performed by: PODIATRIST

## 2019-01-28 PROCEDURE — 99203 OFFICE O/P NEW LOW 30 MIN: CPT | Performed by: PODIATRIST

## 2019-01-28 NOTE — PROGRESS NOTES
Robert Larios  1945  73 y.o. female   Not diabetic    Patient presents today with a complaint of a painful right great toenail, 3rd and 4th toenails and irritation on the plantar aspect of the right foot.    01/28/2019    Chief Complaint   Patient presents with   • Right Foot - Ingrown Toenail, Pain       History of Present Illness    Robert Larios is a 73 y.o.female who presents to clinic today with 2 separate pedal complaints.  Her first complaint is of an ingrowing toenail on her right great toe.  Issue started approximately 2 months ago.  She has done nothing to treat it.  Pain is aggravated with pressure.  She also complains of pain on the bottom of her foot under the right great toe.  She denies any injuries or trauma.  Pain is aggravated with weightbearing.  She rates the pain as a 2 out of 10.  Patient states that she wears custom orthotics.  She states they're very worn.  She has no other pedal complaints.      Past Medical History:   Diagnosis Date   • Acute otitis media 09/10/2015   • Acute pharyngitis 09/10/2015   • Acute sinusitis 09/10/2015   • Biliary colic 10/17/2014    STONES BY REVIEW US   • Change in bowel habits 06/28/2016   • Chest rales 9/262015    MEDIUM   • Cholelithiasis without obstruction 10/17/2014   • Chronic cholecystitis 10/17/2014   • Constipation 02/26/2015   • Degenerative joint disease involving multiple joints 09/10/2014   • Elevated blood-pressure reading without diagnosis of hypertension 05/31/2012    BLOOD PRESSURE NORMAL AT HOME   • Epigastric pain 09/10/2014   • Essential hypertension 07/30/2015   • Fibrocystic breast    • Gastritis 02/26/2015   • GERD (gastroesophageal reflux disease) 01/28/2015   • Hammer toe 12/02/2015   • History of colonoscopy 08/28/2014    Information taken from [facility]    • Hyperlipidemia 07/30/2015   • Ingrowing nail [id number]   • Ingrown toenail    • Irritable bowel disease    • Pain in right foot 11/11/2015   • Peripheral  neuropathy 09/10/2014   • Plantar fasciitis          Past Surgical History:   Procedure Laterality Date   • BREAST BIOPSY  11/22/2004    R mammotome biop w/ image guided placement of metallic localization clip & stereotactic location & guidance for breast biop. radiologic examination of the surgical specimen & unilateral mammography   • BREAST CYST ASPIRATION     • CHOLECYSTECTOMY  10/09/2014   • COLONOSCOPY  08/28/2014    Hemorrhoids found.   • COLONOSCOPY N/A 12/6/2018    Procedure: COLONOSCOPY;  Surgeon: Everardo Beltran DO;  Location: [facility] ENDOSCOPY;  Service: Gastroenterology   • DILATATION AND CURETTAGE  08/28/2014    Postmenopausal bleeding   • ENDOSCOPY  08/28/2014    Normal hypopharynx, esophagus. A hiatus hernia found in the GE junction.Non-erosive gastritis found inthe stomach. Multiple biopsies taken.Moderate amount of bile sained fluid in the stomach.Normal, symmetrical, and patent pylorus.Normal duodenum.   • INJECTION OF MEDICATION  07/30/2015    KENALOG(1)   • TONSILLECTOMY           Family History   Problem Relation Age of Onset   • Thyroid disease Mother    • Hypertension Mother    • Obesity Mother    • Osteoporosis Mother    • Stroke Mother    • Prostate cancer Father    • Cancer Father    • Diabetes Father    • Heart attack Father    • Hypertension Father    • Heart disease Father    • Obesity Father    • Cancer Other    • Diabetes Other    • Heart disease Other    • Stroke Other    • Thyroid disease Other    • Arthritis Maternal Grandmother    • Kidney disease Maternal Grandmother    • Stroke Paternal Grandmother    • Diabetes Sister        Allergies   Allergen Reactions   • Adhesive Tape      Blistering of Skin   • Darvon [Propoxyphene] Nausea And Vomiting   • Bactrim [Sulfamethoxazole-Trimethoprim] Rash   • Macrodantin [Nitrofurantoin Macrocrystal]      Drug Fever   • Sulfa Antibiotics Rash       Social History     Socioeconomic History   • Marital status:      Spouse name: Not on  file   • Number of children: Not on file   • Years of education: Not on file   • Highest education level: Not on file   Social Needs   • Financial resource strain: Not on file   • Food insecurity - worry: Not on file   • Food insecurity - inability: Not on file   • Transportation needs - medical: Not on file   • Transportation needs - non-medical: Not on file   Occupational History   • Not on file   Tobacco Use   • Smoking status: Never Smoker   • Smokeless tobacco: Never Used   Substance and Sexual Activity   • Alcohol use: No   • Drug use: No   • Sexual activity: Defer   Other Topics Concern   • Not on file   Social History Narrative   • Not on file         Current Outpatient Medications   Medication Sig Dispense Refill   • aluminum hydroxide-mag carbonate (GAVISCON EXTRA RELIEF) 160-105 MG chewable tablet chewable tablet Chew Daily.     • aspirin 81 MG EC tablet Take 81 mg by mouth 2 (Two) Times a Week.     • Calcium Carbonate-Vit D-Min (CALTRATE PLUS PO) Take 1 tablet by mouth 2 (Two) Times a Day.     • Cholecalciferol (VITAMIN D3) 5000 units capsule capsule Take 4,000 Units by mouth Daily.     • esomeprazole (nexIUM) 20 MG capsule Take 20 mg by mouth 2 (Two) Times a Day.     • folic acid (FOLVITE) 400 MCG tablet Take 400 mcg by mouth Daily.     • loperamide (IMODIUM) 2 MG capsule Take 2 mg by mouth As Needed for diarrhea (2 times per week).     • metoprolol succinate XL (TOPROL-XL) 25 MG 24 hr tablet Take 1 tablet by mouth Daily. 30 tablet 5   • NON FORMULARY 30mg vitamin B-6  400 mcg vitamin B-12  350 mg DHA  150 mg EPA  200 mg L-Carnitine  50mg CO-Q 10  30 mg trans-resveratrol     • Omega-3 350 MG capsule delayed-release        No current facility-administered medications for this visit.        Review of Systems   Constitutional: Negative.    HENT: Negative.    Eyes: Negative.    Respiratory: Negative.    Cardiovascular: Negative.    Gastrointestinal:        IBS   Genitourinary: Positive for enuresis.  "  Musculoskeletal: Positive for back pain.        Right foot and toenail pain   Neurological: Negative.    Psychiatric/Behavioral: Negative.          OBJECTIVE    Pulse 111   Ht 160 cm (63\")   Wt 71.2 kg (157 lb)   SpO2 98%   BMI 27.81 kg/m²       Physical Exam   Constitutional: She is oriented to person, place, and time. She appears well-developed and well-nourished. No distress.   HENT:   Head: Normocephalic and atraumatic.   Nose: Nose normal.   Eyes: Conjunctivae and EOM are normal. Pupils are equal, round, and reactive to light.   Pulmonary/Chest: Effort normal. No respiratory distress. She has no wheezes.   Neurological: She is alert and oriented to person, place, and time. She displays normal reflexes.   Skin: Skin is warm and dry. Capillary refill takes less than 2 seconds.   Psychiatric: She has a normal mood and affect. Her behavior is normal.   Vitals reviewed.      Gait: normal     Assistive Device: none     Lower Extremity    Cardiovascular:    DP/PT pulses palpable bilateral  CFT brisk  to all digits  Skin temp is warm to warm from proximal tibia to distal digits bilateral  No erythema or edema noted     Musculoskeletal:  Muscle strength is 5/5 for all muscle groups tested   ROM of the 1st MTP is full without pain or crepitus bilateral  ROM of the MTJ is full without pain or crepitus  bilateral  ROM of the STJ is full without pain or crepitus bilateral   ROM of the ankle joint is full without pain or crepitus  bilateral  Pain on palpation to the plantar first metatarsal head on the right foot.    Dermatological:   Right hallux nail is incurvated and ingrowing on the medial border.  No drainage or erythema.  Significant tenderness to palpation.    Skin is warm, dry and intact bilateral  Webspaces 1-4 bilateral are clean, dry and intact.   No subcutaneous nodules or masses noted    No open wounds noted     Neurological:   Protective sensation intact   Sensation intact to light touch  "         Procedures        ASSESSMENT AND PLAN    Robert was seen today for ingrown toenail and pain.    Diagnoses and all orders for this visit:    Right foot pain    Ingrown toenail      - Comprehensive foot and ankle exam performed  - Diagnosis, prevention and treatment of ingrown toenails discussed with patient, including risks and potential benefits of nail avulsion both temporary and permanent versus simple debridement.  - Patient elected for a partial permanent nail avulsion  - Dispensed aftercare instruction sheet  - Evaluated patient's custom orthotics.  Orthotics are broken down in the area of patient's pain.  Rx for new custom orthotics.  - All questions were answered and the patient is in agreement with the current treatment plan.  - RTC in 2 weeks          This document has been electronically signed by Kavon Brown DPM on January 28, 2019 12:11 PM     1/28/2019  12:11 PM

## 2019-01-29 ENCOUNTER — TRANSCRIBE ORDERS (OUTPATIENT)
Dept: PODIATRY | Facility: CLINIC | Age: 74
End: 2019-01-29

## 2019-01-29 DIAGNOSIS — M77.41 METATARSALGIA OF RIGHT FOOT: Primary | ICD-10-CM

## 2019-02-07 ENCOUNTER — HOSPITAL ENCOUNTER (OUTPATIENT)
Dept: PHYSICAL THERAPY | Facility: HOSPITAL | Age: 74
Setting detail: THERAPIES SERIES
Discharge: HOME | End: 2019-02-07

## 2019-02-07 DIAGNOSIS — M77.40 METATARSALGIA, UNSPECIFIED LATERALITY: Primary | ICD-10-CM

## 2019-02-07 PROCEDURE — 97161 PT EVAL LOW COMPLEX 20 MIN: CPT | Performed by: PHYSICAL THERAPIST

## 2019-02-07 NOTE — THERAPY EVALUATION
Outpatient Physical Therapy Ortho Initial Evaluation  [facility]     Patient Name: Robert Larios  : 1945  MRN: [id number]  Today's Date: 2019      Visit Date: 2019    Patient Active Problem List   Diagnosis   • Hyperlipidemia   • GERD (gastroesophageal reflux disease)   • Essential hypertension   • Degenerative joint disease involving multiple joints   • Constipation   • Nuclear cataract   • Episcleritis   • Corneal ulcer, marginal   • Palpitation   • Abnormal EKG   • Dizziness   • Precordial pain   • Osteoporosis        Past Medical History:   Diagnosis Date   • Acute otitis media 09/10/2015   • Acute pharyngitis 09/10/2015   • Acute sinusitis 09/10/2015   • Biliary colic 10/17/2014    STONES BY REVIEW US   • Change in bowel habits 2016   • Chest rales     MEDIUM   • Cholelithiasis without obstruction 10/17/2014   • Chronic cholecystitis 10/17/2014   • Constipation 2015   • Degenerative joint disease involving multiple joints 09/10/2014   • Elevated blood-pressure reading without diagnosis of hypertension 2012    BLOOD PRESSURE NORMAL AT HOME   • Epigastric pain 09/10/2014   • Essential hypertension 2015   • Fibrocystic breast    • Gastritis 2015   • GERD (gastroesophageal reflux disease) 2015   • Hammer toe 2015   • History of colonoscopy 2014    Information taken from [facility]    • Hyperlipidemia 2015   • Ingrowing nail [id number]   • Ingrown toenail    • Irritable bowel disease    • Pain in right foot 2015   • Peripheral neuropathy 09/10/2014   • Plantar fasciitis         Past Surgical History:   Procedure Laterality Date   • BREAST BIOPSY  2004    R mammotome biop w/ image guided placement of metallic localization clip & stereotactic location & guidance for breast biop. radiologic examination of the surgical specimen & unilateral mammography   • BREAST CYST ASPIRATION     • CHOLECYSTECTOMY  10/09/2014   •  "COLONOSCOPY  08/28/2014    Hemorrhoids found.   • COLONOSCOPY N/A 12/6/2018    Procedure: COLONOSCOPY;  Surgeon: Everardo Beltran DO;  Location: [facility] ENDOSCOPY;  Service: Gastroenterology   • DILATATION AND CURETTAGE  08/28/2014    Postmenopausal bleeding   • ENDOSCOPY  08/28/2014    Normal hypopharynx, esophagus. A hiatus hernia found in the GE junction.Non-erosive gastritis found inthe stomach. Multiple biopsies taken.Moderate amount of bile sained fluid in the stomach.Normal, symmetrical, and patent pylorus.Normal duodenum.   • INJECTION OF MEDICATION  07/30/2015    KENALOG(1)   • TONSILLECTOMY         Visit Dx:     ICD-10-CM ICD-9-CM   1. Metatarsalgia, unspecified laterality M77.40 726.70       Patient History     Row Name 02/07/19 1431             History    Chief Complaint  Pain  -BB      Type of Pain  Foot pain  -BB      Brief Description of Current Complaint  Present today with history of custom orthotics that were made in [place] by [facility] and they are broke down and falling apart. Reports them orginally helping alot. Notes pain of met heads. Denies numbness or tingling. Reports having a toe nail removed on right that is sore with bandaide covering. \"stinging\" where the ingrown toenail removeal  -BB      Patient's Rating of General Health  Very good  -BB      Hand Dominance  right-handed  -BB      Occupation/sports/leisure activities  Retired  -BB         Pain     Pain Location  Foot  -BB      Pain at Present  0  -BB      Pain at Best  0  -BB      Pain at Worst  6  -BB      Pain Frequency  Intermittent  -BB      Is your sleep disturbed?  Yes  -BB      Is medication used to assist with sleep?  No  -BB         Fall Risk Assessment    Any falls in the past year:  No  -BB        User Key  (r) = Recorded By, (t) = Taken By, (c) = Cosigned By    Initials Name Provider Type    Kristy Earl PT Physical Therapist          PT Ortho     Row Name 02/07/19 1430       Subjective Comments    Subjective Comments "  See patient history  -BB       Precautions and Contraindications    Precautions/Limitations  no known precautions/limitations  -BB       Subjective Pain    Able to rate subjective pain?  yes  -BB    Pre-Treatment Pain Level  0  -BB    Post-Treatment Pain Level  0  -BB       Posture/Observations    Posture/Observations Comments  Right great toe bandage for toe nail removal. Slight blue discoloration noted biltateral feet plantar. R>L calcaneal valgus, bilateral forefoot valgus   -BB       Special Tests/Palpation    Special Tests/Palpation  -- right great toe, right 5th ray. left WNL  -BB       General ROM    GENERAL ROM COMMENTS  Toes WNL, ankle WNL   -BB       MMT (Manual Muscle Testing)    General MMT Comments  Ankles WNl   -BB       Sensation    Sensation WNL?  WNL  -BB    Light Touch  No apparent deficits  -BB       Gait/Stairs Assessment/Training    Comment (Gait/Stairs)  antalgic favoring RLE with right foot inverted to avoid great toe contact to ground   -BB      User Key  (r) = Recorded By, (t) = Taken By, (c) = Cosigned By    Initials Name Provider Type    Kristy Earl PT Physical Therapist                      Therapy Education  Education Details: wear schedule and skin inspection, shoe education   Given: Other (comment)  Program: New  How Provided: Verbal  Provided to: Patient  Level of Understanding: Verbalized     PT OP Goals     Row Name 02/07/19 1430          PT Short Term Goals    STG Date to Achieve  02/19/19  -BB     STG 1  independent in wear of and skin inspection with custom orthotics   -BB        Time Calculation    PT Goal Re-Cert Due Date  02/28/19  -BB       User Key  (r) = Recorded By, (t) = Taken By, (c) = Cosigned By    Initials Name Provider Type    Kristy Earl PT Physical Therapist          PT Assessment/Plan     Row Name 02/07/19 1430          PT Assessment    Functional Limitations  Impaired gait  -BB     Impairments  Gait;Pain  -BB     Assessment Comments  Patient  presents today with history of PF and right forefoot pain and right great toe pain after a toenail being removed. Patients sensation is in tact. No significant skin break down noted. Has noted forefoot hypermobility and calcaneal valgus. Patient could benefit from custom orthotics to improve weight distribution and stability. Orthotics to be fabricated per MD orders for shockers with bilateral met rise and padded undercover  -BB     Rehab Potential  Good  -BB     Patient/caregiver participated in establishment of treatment plan and goals  Yes  -BB     Patient would benefit from skilled therapy intervention  Yes  -BB        PT Plan    Predicted Duration of Therapy Intervention (Therapy Eval)  Orthotic check out  -BB     Planned CPT's?  PT EVAL LOW COMPLEXITY: 63293;PT ORTHOTIC MGMT/TRAIN EA 15 MIN: 62550  -BB     PT Plan Comments  Orthotic check out and adjustment PRN  -BB       User Key  (r) = Recorded By, (t) = Taken By, (c) = Cosigned By    Initials Name Provider Type    Kristy Earl PT Physical Therapist            Exercises     Row Name 02/07/19 1430             Subjective Comments    Subjective Comments  See patient history  -BB         Subjective Pain    Able to rate subjective pain?  yes  -BB      Pre-Treatment Pain Level  0  -BB      Post-Treatment Pain Level  0  -BB        User Key  (r) = Recorded By, (t) = Taken By, (c) = Cosigned By    Initials Name Provider Type    Kristy Earl PT Physical Therapist                                  Time Calculation:         Start Time: 1430  Stop Time: 1505  Time Calculation (min): 35 min     Therapy Charges for Today     Code Description Service Date Service Provider Modifiers Qty    [id number]  PT EVAL LOW COMPLEXITY 1 2/7/2019 Kristy Mcgee PT GP 1    [id number]  PT-CUSTOM ORTHOTICS-LEVEL 2 2/7/2019 Kristy Mcgee PT  1                    Kristy Mcgee PT  2/7/2019

## 2019-02-11 ENCOUNTER — OFFICE VISIT (OUTPATIENT)
Dept: PODIATRY | Facility: CLINIC | Age: 74
End: 2019-02-11

## 2019-02-11 VITALS — OXYGEN SATURATION: 98 % | WEIGHT: 157 LBS | HEART RATE: 105 BPM | HEIGHT: 63 IN | BODY MASS INDEX: 27.82 KG/M2

## 2019-02-11 DIAGNOSIS — L60.0 INGROWN TOENAIL: ICD-10-CM

## 2019-02-11 DIAGNOSIS — M79.671 RIGHT FOOT PAIN: Primary | ICD-10-CM

## 2019-02-11 PROCEDURE — 99213 OFFICE O/P EST LOW 20 MIN: CPT | Performed by: PODIATRIST

## 2019-02-11 NOTE — PROGRESS NOTES
Robert Larios  1945  73 y.o. female   Not diabetic    Patient presents today for follow up of right hallux nail avulsion     Chief Complaint   Patient presents with   • Right Foot - Follow-up       History of Present Illness    73-year-old female presents to clinic today for follow-up of her right hallux toenail avulsion.  She has been soaking and dressing as instructed.  She relates to mild post procedure pain when walking.  She has been fitted for custom orthotics.  She denies any new pedal complaints.    Past Medical History:   Diagnosis Date   • Acute otitis media 09/10/2015   • Acute pharyngitis 09/10/2015   • Acute sinusitis 09/10/2015   • Biliary colic 10/17/2014    STONES BY REVIEW US   • Change in bowel habits 06/28/2016   • Chest rales 9/262015    MEDIUM   • Cholelithiasis without obstruction 10/17/2014   • Chronic cholecystitis 10/17/2014   • Constipation 02/26/2015   • Degenerative joint disease involving multiple joints 09/10/2014   • Elevated blood-pressure reading without diagnosis of hypertension 05/31/2012    BLOOD PRESSURE NORMAL AT HOME   • Epigastric pain 09/10/2014   • Essential hypertension 07/30/2015   • Fibrocystic breast    • Gastritis 02/26/2015   • GERD (gastroesophageal reflux disease) 01/28/2015   • Hammer toe 12/02/2015   • History of colonoscopy 08/28/2014    Information taken from [facility]    • Hyperlipidemia 07/30/2015   • Ingrowing nail [id number]   • Ingrown toenail    • Irritable bowel disease    • Pain in right foot 11/11/2015   • Peripheral neuropathy 09/10/2014   • Plantar fasciitis          Past Surgical History:   Procedure Laterality Date   • BREAST BIOPSY  11/22/2004    R mammotome biop w/ image guided placement of metallic localization clip & stereotactic location & guidance for breast biop. radiologic examination of the surgical specimen & unilateral mammography   • BREAST CYST ASPIRATION     • CHOLECYSTECTOMY  10/09/2014   • COLONOSCOPY  08/28/2014     Hemorrhoids found.   • COLONOSCOPY N/A 12/6/2018    Procedure: COLONOSCOPY;  Surgeon: Everardo Beltran DO;  Location: [facility] ENDOSCOPY;  Service: Gastroenterology   • DILATATION AND CURETTAGE  08/28/2014    Postmenopausal bleeding   • ENDOSCOPY  08/28/2014    Normal hypopharynx, esophagus. A hiatus hernia found in the GE junction.Non-erosive gastritis found inthe stomach. Multiple biopsies taken.Moderate amount of bile sained fluid in the stomach.Normal, symmetrical, and patent pylorus.Normal duodenum.   • INJECTION OF MEDICATION  07/30/2015    KENALOG(1)   • TONSILLECTOMY           Family History   Problem Relation Age of Onset   • Thyroid disease Mother    • Hypertension Mother    • Obesity Mother    • Osteoporosis Mother    • Stroke Mother    • Prostate cancer Father    • Cancer Father    • Diabetes Father    • Heart attack Father    • Hypertension Father    • Heart disease Father    • Obesity Father    • Cancer Other    • Diabetes Other    • Heart disease Other    • Stroke Other    • Thyroid disease Other    • Arthritis Maternal Grandmother    • Kidney disease Maternal Grandmother    • Stroke Paternal Grandmother    • Diabetes Sister        Allergies   Allergen Reactions   • Adhesive Tape      Blistering of Skin   • Darvon [Propoxyphene] Nausea And Vomiting   • Bactrim [Sulfamethoxazole-Trimethoprim] Rash   • Macrodantin [Nitrofurantoin Macrocrystal]      Drug Fever   • Sulfa Antibiotics Rash       Social History     Socioeconomic History   • Marital status:      Spouse name: Not on file   • Number of children: Not on file   • Years of education: Not on file   • Highest education level: Not on file   Social Needs   • Financial resource strain: Not on file   • Food insecurity - worry: Not on file   • Food insecurity - inability: Not on file   • Transportation needs - medical: Not on file   • Transportation needs - non-medical: Not on file   Occupational History   • Not on file   Tobacco Use   • Smoking  "status: Never Smoker   • Smokeless tobacco: Never Used   Substance and Sexual Activity   • Alcohol use: No   • Drug use: No   • Sexual activity: Defer   Other Topics Concern   • Not on file   Social History Narrative   • Not on file         Current Outpatient Medications   Medication Sig Dispense Refill   • aluminum hydroxide-mag carbonate (GAVISCON EXTRA RELIEF) 160-105 MG chewable tablet chewable tablet Chew Daily.     • aspirin 81 MG EC tablet Take 81 mg by mouth 2 (Two) Times a Week.     • Calcium Carbonate-Vit D-Min (CALTRATE PLUS PO) Take 1 tablet by mouth 2 (Two) Times a Day.     • Cholecalciferol (VITAMIN D3) 5000 units capsule capsule Take 4,000 Units by mouth Daily.     • esomeprazole (nexIUM) 20 MG capsule Take 20 mg by mouth 2 (Two) Times a Day.     • folic acid (FOLVITE) 400 MCG tablet Take 400 mcg by mouth Daily.     • loperamide (IMODIUM) 2 MG capsule Take 2 mg by mouth As Needed for diarrhea (2 times per week).     • metoprolol succinate XL (TOPROL-XL) 25 MG 24 hr tablet Take 1 tablet by mouth Daily. 30 tablet 5   • NON FORMULARY 30mg vitamin B-6  400 mcg vitamin B-12  350 mg DHA  150 mg EPA  200 mg L-Carnitine  50mg CO-Q 10  30 mg trans-resveratrol     • Omega-3 350 MG capsule delayed-release        No current facility-administered medications for this visit.        Review of Systems   Constitutional: Negative.    HENT: Negative.    Eyes: Negative.    Respiratory: Negative.    Cardiovascular: Negative.    Gastrointestinal:        IBS   Musculoskeletal: Positive for back pain.        Right foot pain    Neurological: Negative.    Psychiatric/Behavioral: Negative.          OBJECTIVE    Pulse 105   Ht 160 cm (62.99\")   Wt 71.2 kg (157 lb)   SpO2 98%   BMI 27.82 kg/m²       Physical Exam   Constitutional: She is oriented to person, place, and time. She appears well-developed and well-nourished. No distress.   HENT:   Head: Normocephalic and atraumatic.   Nose: Nose normal.   Pulmonary/Chest: Effort " normal. No respiratory distress. She has no wheezes.   Neurological: She is alert and oriented to person, place, and time.   Psychiatric: She has a normal mood and affect. Her behavior is normal.   Vitals reviewed.      Gait: normal     Assistive Device: none     Right Lower Extremity    Cardiovascular:    DP/PT pulses palpable   CFT brisk  to all digits  No erythema or edema noted     Musculoskeletal:  Muscle strength is 5/5 for all muscle groups tested   ROM of the 1st MTP is full without pain or crepitus   ROM of the MTJ is full without pain or crepitus    ROM of the STJ is full without pain or crepitus   ROM of the ankle joint is full without pain or crepitus    Pain on palpation to the plantar first metatarsal head on the right foot.    Dermatological:   Right hallux nail is absent on the medial border.  Mild maceration noted.  No signs of infection.  Webspaces 1-4 are clean, dry and intact.   No subcutaneous nodules or masses noted      Neurological:   Protective sensation intact   Sensation intact to light touch          Procedures        ASSESSMENT AND PLAN    Robert was seen today for follow-up.    Diagnoses and all orders for this visit:    Right foot pain    Ingrown toenail      -Patient is doing very well post right hallux toenail procedure.  Keep soaking and dressing the toe until there is no drainage.  -Patient has been fitted for a custom orthotics.  - All questions were answered   - RTC in 2 weeks after obtaining new orthotics          This document has been electronically signed by Kavon Brown DPM on February 11, 2019 10:14 AM     2/11/2019  10:14 AM

## 2019-02-27 ENCOUNTER — TELEPHONE (OUTPATIENT)
Dept: PODIATRY | Facility: CLINIC | Age: 74
End: 2019-02-27

## 2019-02-27 NOTE — TELEPHONE ENCOUNTER
GUIDO    PATIENT LEFT VOICEMAIL THAT SHE IS STILL HAVING A SMALL AMOUNT OF DRAINAGE AND PAIN FROM THE TOE THAT THE NAIL WAS REMOVED FROM.    DOES SHE NEED ANTIBIOTICS OR SHOULD SHE COME BACK IN TO SEE DOCTOR?

## 2019-03-04 ENCOUNTER — OFFICE VISIT (OUTPATIENT)
Dept: PODIATRY | Facility: CLINIC | Age: 74
End: 2019-03-04

## 2019-03-04 VITALS — WEIGHT: 157 LBS | HEIGHT: 63 IN | OXYGEN SATURATION: 97 % | BODY MASS INDEX: 27.82 KG/M2 | HEART RATE: 91 BPM

## 2019-03-04 DIAGNOSIS — L60.0 INGROWN TOENAIL: Primary | ICD-10-CM

## 2019-03-04 PROCEDURE — 99212 OFFICE O/P EST SF 10 MIN: CPT | Performed by: PODIATRIST

## 2019-03-04 NOTE — PROGRESS NOTES
Robert Larios  1945  73 y.o. female   Not diabetic    Patient presents today for follow up of right hallux nail avulsion     Chief Complaint   Patient presents with   • Right Foot - Follow-up       History of Present Illness    Patient presents to clinic today for follow-up of her right hallux toenail avulsion.  She has been soaking and dressing as instructed.  She relates that she still has drainage.  She is concerned that this may not be normal.  She denies any other pedal complaints.    Past Medical History:   Diagnosis Date   • Acute otitis media 09/10/2015   • Acute pharyngitis 09/10/2015   • Acute sinusitis 09/10/2015   • Biliary colic 10/17/2014    STONES BY REVIEW US   • Change in bowel habits 06/28/2016   • Chest rales 9/262015    MEDIUM   • Cholelithiasis without obstruction 10/17/2014   • Chronic cholecystitis 10/17/2014   • Constipation 02/26/2015   • Degenerative joint disease involving multiple joints 09/10/2014   • Elevated blood-pressure reading without diagnosis of hypertension 05/31/2012    BLOOD PRESSURE NORMAL AT HOME   • Epigastric pain 09/10/2014   • Essential hypertension 07/30/2015   • Fibrocystic breast    • Gastritis 02/26/2015   • GERD (gastroesophageal reflux disease) 01/28/2015   • Hammer toe 12/02/2015   • History of colonoscopy 08/28/2014    Information taken from [facility]    • Hyperlipidemia 07/30/2015   • Ingrowing nail [id number]   • Ingrown toenail    • Irritable bowel disease    • Pain in right foot 11/11/2015   • Peripheral neuropathy 09/10/2014   • Plantar fasciitis          Past Surgical History:   Procedure Laterality Date   • BREAST BIOPSY  11/22/2004    R mammotome biop w/ image guided placement of metallic localization clip & stereotactic location & guidance for breast biop. radiologic examination of the surgical specimen & unilateral mammography   • BREAST CYST ASPIRATION     • CHOLECYSTECTOMY  10/09/2014   • COLONOSCOPY  08/28/2014    Hemorrhoids found.   •  COLONOSCOPY N/A 12/6/2018    Procedure: COLONOSCOPY;  Surgeon: Everardo Beltran DO;  Location: [facility] ENDOSCOPY;  Service: Gastroenterology   • DILATATION AND CURETTAGE  08/28/2014    Postmenopausal bleeding   • ENDOSCOPY  08/28/2014    Normal hypopharynx, esophagus. A hiatus hernia found in the GE junction.Non-erosive gastritis found inthe stomach. Multiple biopsies taken.Moderate amount of bile sained fluid in the stomach.Normal, symmetrical, and patent pylorus.Normal duodenum.   • INJECTION OF MEDICATION  07/30/2015    KENALOG(1)   • TONSILLECTOMY           Family History   Problem Relation Age of Onset   • Thyroid disease Mother    • Hypertension Mother    • Obesity Mother    • Osteoporosis Mother    • Stroke Mother    • Prostate cancer Father    • Cancer Father    • Diabetes Father    • Heart attack Father    • Hypertension Father    • Heart disease Father    • Obesity Father    • Cancer Other    • Diabetes Other    • Heart disease Other    • Stroke Other    • Thyroid disease Other    • Arthritis Maternal Grandmother    • Kidney disease Maternal Grandmother    • Stroke Paternal Grandmother    • Diabetes Sister        Allergies   Allergen Reactions   • Adhesive Tape      Blistering of Skin   • Darvon [Propoxyphene] Nausea And Vomiting   • Bactrim [Sulfamethoxazole-Trimethoprim] Rash   • Macrodantin [Nitrofurantoin Macrocrystal]      Drug Fever   • Sulfa Antibiotics Rash       Social History     Socioeconomic History   • Marital status:      Spouse name: Not on file   • Number of children: Not on file   • Years of education: Not on file   • Highest education level: Not on file   Social Needs   • Financial resource strain: Not on file   • Food insecurity - worry: Not on file   • Food insecurity - inability: Not on file   • Transportation needs - medical: Not on file   • Transportation needs - non-medical: Not on file   Occupational History   • Not on file   Tobacco Use   • Smoking status: Never Smoker   •  "Smokeless tobacco: Never Used   Substance and Sexual Activity   • Alcohol use: No   • Drug use: No   • Sexual activity: Defer   Other Topics Concern   • Not on file   Social History Narrative   • Not on file         Current Outpatient Medications   Medication Sig Dispense Refill   • aluminum hydroxide-mag carbonate (GAVISCON EXTRA RELIEF) 160-105 MG chewable tablet chewable tablet Chew Daily.     • aspirin 81 MG EC tablet Take 81 mg by mouth 2 (Two) Times a Week.     • Calcium Carbonate-Vit D-Min (CALTRATE PLUS PO) Take 1 tablet by mouth 2 (Two) Times a Day.     • Cholecalciferol (VITAMIN D3) 5000 units capsule capsule Take 4,000 Units by mouth Daily.     • esomeprazole (nexIUM) 20 MG capsule Take 20 mg by mouth 2 (Two) Times a Day.     • folic acid (FOLVITE) 400 MCG tablet Take 400 mcg by mouth Daily.     • loperamide (IMODIUM) 2 MG capsule Take 2 mg by mouth As Needed for diarrhea (2 times per week).     • metoprolol succinate XL (TOPROL-XL) 25 MG 24 hr tablet Take 1 tablet by mouth Daily. 30 tablet 5   • NON FORMULARY 30mg vitamin B-6  400 mcg vitamin B-12  350 mg DHA  150 mg EPA  200 mg L-Carnitine  50mg CO-Q 10  30 mg trans-resveratrol     • Omega-3 350 MG capsule delayed-release        No current facility-administered medications for this visit.        Review of Systems   Constitutional: Negative.    HENT: Negative.    Eyes: Negative.    Respiratory: Negative.    Cardiovascular: Negative.    Gastrointestinal:        IBS   Musculoskeletal: Positive for back pain.   Neurological: Negative.    Psychiatric/Behavioral: Negative.          OBJECTIVE    Pulse 91   Ht 160 cm (62.99\")   Wt 71.2 kg (157 lb)   SpO2 97%   BMI 27.82 kg/m²       Physical Exam   Constitutional: She is oriented to person, place, and time. She appears well-developed and well-nourished. No distress.   HENT:   Head: Normocephalic and atraumatic.   Nose: Nose normal.   Pulmonary/Chest: Effort normal. No respiratory distress.   Neurological: " She is alert and oriented to person, place, and time.   Psychiatric: She has a normal mood and affect. Her behavior is normal.   Vitals reviewed.      Gait: normal     Assistive Device: none     Right Lower Extremity    Cardiovascular:    DP/PT pulses palpable   CFT brisk  to all digits  No erythema or edema noted     Musculoskeletal:  Muscle strength is 5/5 for all muscle groups tested   ROM of the 1st MTP is full without pain or crepitus   ROM of the ankle joint is full without pain or crepitus      Dermatological:   Right hallux nail is absent on the medial border.  No surrounding erythema, foul odor or purulent drainage noted.  Webspaces 1-4 are clean, dry and intact.   No subcutaneous nodules or masses noted      Neurological:   Protective sensation intact   Sensation intact to light touch          Procedures        ASSESSMENT AND PLAN    Robert was seen today for follow-up.    Diagnoses and all orders for this visit:    Ingrown toenail      -Nail avulsion site is healing well.  No signs of infection.  Continue soaking and dressing until there is no drainage.  - All questions were answered   - RTC as needed          This document has been electronically signed by Kavon Brown DPM on March 4, 2019 11:46 AM     3/4/2019  11:46 AM

## 2019-04-30 ENCOUNTER — APPOINTMENT (OUTPATIENT)
Dept: ONCOLOGY | Facility: HOSPITAL | Age: 74
End: 2019-04-30

## 2019-05-01 ENCOUNTER — INFUSION (OUTPATIENT)
Dept: ONCOLOGY | Facility: HOSPITAL | Age: 74
End: 2019-05-01

## 2019-05-01 DIAGNOSIS — M80.00XD AGE-RELATED OSTEOPOROSIS WITH CURRENT PATHOLOGICAL FRACTURE WITH ROUTINE HEALING, SUBSEQUENT ENCOUNTER: Primary | ICD-10-CM

## 2019-05-01 DIAGNOSIS — M81.0 AGE-RELATED OSTEOPOROSIS WITHOUT CURRENT PATHOLOGICAL FRACTURE: ICD-10-CM

## 2019-05-01 LAB
ALBUMIN SERPL-MCNC: 3.8 G/DL (ref 3.5–5.2)
ALBUMIN/GLOB SERPL: 1.2 G/DL
ALP SERPL-CCNC: 42 U/L (ref 39–117)
ALT SERPL W P-5'-P-CCNC: 30 U/L (ref 1–33)
ANION GAP SERPL CALCULATED.3IONS-SCNC: 10 MMOL/L
AST SERPL-CCNC: 36 U/L (ref 1–32)
BILIRUB SERPL-MCNC: 0.6 MG/DL (ref 0.2–1.2)
BUN BLD-MCNC: 10 MG/DL (ref 8–23)
BUN/CREAT SERPL: 12.2 (ref 7–25)
CALCIUM SPEC-SCNC: 9.3 MG/DL (ref 8.6–10.5)
CHLORIDE SERPL-SCNC: 104 MMOL/L (ref 98–107)
CO2 SERPL-SCNC: 25 MMOL/L (ref 22–29)
CREAT BLD-MCNC: 0.82 MG/DL (ref 0.57–1)
GFR SERPL CREATININE-BSD FRML MDRD: 69 ML/MIN/1.73
GLOBULIN UR ELPH-MCNC: 3.1 GM/DL
GLUCOSE BLD-MCNC: 101 MG/DL (ref 65–99)
MAGNESIUM SERPL-MCNC: 2.2 MG/DL (ref 1.6–2.4)
PHOSPHATE SERPL-MCNC: 3.3 MG/DL (ref 2.5–4.5)
POTASSIUM BLD-SCNC: 4.3 MMOL/L (ref 3.5–5.2)
PROT SERPL-MCNC: 6.9 G/DL (ref 6–8.5)
SODIUM BLD-SCNC: 139 MMOL/L (ref 136–145)

## 2019-05-01 PROCEDURE — 83735 ASSAY OF MAGNESIUM: CPT | Performed by: GENERAL PRACTICE

## 2019-05-01 PROCEDURE — 96372 THER/PROPH/DIAG INJ SC/IM: CPT | Performed by: NURSE PRACTITIONER

## 2019-05-01 PROCEDURE — 80053 COMPREHEN METABOLIC PANEL: CPT | Performed by: GENERAL PRACTICE

## 2019-05-01 PROCEDURE — 25010000002 DENOSUMAB 60 MG/ML SOLUTION PREFILLED SYRINGE: Performed by: GENERAL PRACTICE

## 2019-05-01 PROCEDURE — 84100 ASSAY OF PHOSPHORUS: CPT | Performed by: GENERAL PRACTICE

## 2019-05-01 PROCEDURE — 36415 COLL VENOUS BLD VENIPUNCTURE: CPT | Performed by: NURSE PRACTITIONER

## 2019-05-01 RX ADMIN — DENOSUMAB 60 MG: 60 INJECTION SUBCUTANEOUS at 09:34

## 2019-06-21 RX ORDER — METOPROLOL SUCCINATE 25 MG/1
25 TABLET, EXTENDED RELEASE ORAL DAILY
Qty: 30 TABLET | Refills: 5 | Status: SHIPPED | OUTPATIENT
Start: 2019-06-21 | End: 2019-12-13 | Stop reason: SDUPTHER

## 2019-10-14 ENCOUNTER — LAB (OUTPATIENT)
Dept: LAB | Facility: HOSPITAL | Age: 74
End: 2019-10-14

## 2019-10-14 DIAGNOSIS — I10 ESSENTIAL HYPERTENSION: ICD-10-CM

## 2019-10-14 DIAGNOSIS — E78.2 MIXED HYPERLIPIDEMIA: Chronic | ICD-10-CM

## 2019-10-14 LAB
ALBUMIN SERPL-MCNC: 4.6 G/DL (ref 3.5–5.2)
ALBUMIN/GLOB SERPL: 1.5 G/DL
ALP SERPL-CCNC: 43 U/L (ref 39–117)
ALT SERPL W P-5'-P-CCNC: 32 U/L (ref 1–33)
ANION GAP SERPL CALCULATED.3IONS-SCNC: 10.5 MMOL/L (ref 5–15)
ARTICHOKE IGE QN: 124 MG/DL (ref 0–100)
AST SERPL-CCNC: 22 U/L (ref 1–32)
BACTERIA UR QL AUTO: ABNORMAL /HPF
BILIRUB SERPL-MCNC: 0.5 MG/DL (ref 0.2–1.2)
BILIRUB UR QL STRIP: NEGATIVE
BUN BLD-MCNC: 14 MG/DL (ref 8–23)
BUN/CREAT SERPL: 16.3 (ref 7–25)
CALCIUM SPEC-SCNC: 9.6 MG/DL (ref 8.6–10.5)
CHLORIDE SERPL-SCNC: 101 MMOL/L (ref 98–107)
CLARITY UR: CLEAR
CO2 SERPL-SCNC: 27.5 MMOL/L (ref 22–29)
COLOR UR: YELLOW
CREAT BLD-MCNC: 0.86 MG/DL (ref 0.57–1)
GFR SERPL CREATININE-BSD FRML MDRD: 65 ML/MIN/1.73
GLOBULIN UR ELPH-MCNC: 3 GM/DL
GLUCOSE BLD-MCNC: 93 MG/DL (ref 65–99)
GLUCOSE UR STRIP-MCNC: NEGATIVE MG/DL
HGB UR QL STRIP.AUTO: NEGATIVE
HYALINE CASTS UR QL AUTO: ABNORMAL /LPF
KETONES UR QL STRIP: NEGATIVE
LEUKOCYTE ESTERASE UR QL STRIP.AUTO: NEGATIVE
NITRITE UR QL STRIP: NEGATIVE
PH UR STRIP.AUTO: 6.5 [PH] (ref 5–8)
POTASSIUM BLD-SCNC: 4 MMOL/L (ref 3.5–5.2)
PROT SERPL-MCNC: 7.6 G/DL (ref 6–8.5)
PROT UR QL STRIP: NEGATIVE
RBC # UR: ABNORMAL /HPF
REF LAB TEST METHOD: ABNORMAL
SODIUM BLD-SCNC: 139 MMOL/L (ref 136–145)
SP GR UR STRIP: 1.01 (ref 1–1.03)
SQUAMOUS #/AREA URNS HPF: ABNORMAL /HPF
UROBILINOGEN UR QL STRIP: NORMAL
WBC UR QL AUTO: ABNORMAL /HPF

## 2019-10-14 PROCEDURE — 81001 URINALYSIS AUTO W/SCOPE: CPT

## 2019-10-14 PROCEDURE — 80053 COMPREHEN METABOLIC PANEL: CPT

## 2019-10-14 PROCEDURE — 36415 COLL VENOUS BLD VENIPUNCTURE: CPT

## 2019-10-14 PROCEDURE — 80061 LIPID PANEL: CPT

## 2019-10-14 PROCEDURE — 83721 ASSAY OF BLOOD LIPOPROTEIN: CPT

## 2019-10-16 ENCOUNTER — OFFICE VISIT (OUTPATIENT)
Dept: FAMILY MEDICINE CLINIC | Facility: CLINIC | Age: 74
End: 2019-10-16

## 2019-10-16 VITALS
WEIGHT: 157 LBS | DIASTOLIC BLOOD PRESSURE: 78 MMHG | HEART RATE: 91 BPM | HEIGHT: 63 IN | SYSTOLIC BLOOD PRESSURE: 152 MMHG | BODY MASS INDEX: 27.82 KG/M2 | OXYGEN SATURATION: 99 %

## 2019-10-16 DIAGNOSIS — K21.9 GASTROESOPHAGEAL REFLUX DISEASE WITHOUT ESOPHAGITIS: Chronic | ICD-10-CM

## 2019-10-16 DIAGNOSIS — Z78.0 POST-MENOPAUSAL: ICD-10-CM

## 2019-10-16 DIAGNOSIS — E78.2 MIXED HYPERLIPIDEMIA: Chronic | ICD-10-CM

## 2019-10-16 DIAGNOSIS — M81.0 AGE-RELATED OSTEOPOROSIS WITHOUT CURRENT PATHOLOGICAL FRACTURE: ICD-10-CM

## 2019-10-16 DIAGNOSIS — Z23 NEED FOR INFLUENZA VACCINATION: ICD-10-CM

## 2019-10-16 DIAGNOSIS — Z00.00 MEDICARE ANNUAL WELLNESS VISIT, SUBSEQUENT: Primary | ICD-10-CM

## 2019-10-16 DIAGNOSIS — Z12.31 ENCOUNTER FOR SCREENING MAMMOGRAM FOR MALIGNANT NEOPLASM OF BREAST: ICD-10-CM

## 2019-10-16 DIAGNOSIS — I10 ESSENTIAL HYPERTENSION: Chronic | ICD-10-CM

## 2019-10-16 LAB
CHOLEST SERPL-MCNC: 201 MG/DL (ref 0–200)
HDLC SERPL-MCNC: 58 MG/DL (ref 40–60)
LDLC SERPL CALC-MCNC: 114 MG/DL (ref 0–100)
LDLC/HDLC SERPL: 1.97 {RATIO}
TRIGL SERPL-MCNC: 144 MG/DL (ref 0–150)
VLDLC SERPL-MCNC: 28.8 MG/DL (ref 5–40)

## 2019-10-16 PROCEDURE — 90653 IIV ADJUVANT VACCINE IM: CPT | Performed by: GENERAL PRACTICE

## 2019-10-16 PROCEDURE — G0008 ADMIN INFLUENZA VIRUS VAC: HCPCS | Performed by: GENERAL PRACTICE

## 2019-10-16 PROCEDURE — G0439 PPPS, SUBSEQ VISIT: HCPCS | Performed by: GENERAL PRACTICE

## 2019-10-16 PROCEDURE — 99214 OFFICE O/P EST MOD 30 MIN: CPT | Performed by: GENERAL PRACTICE

## 2019-11-01 ENCOUNTER — INFUSION (OUTPATIENT)
Dept: ONCOLOGY | Facility: HOSPITAL | Age: 74
End: 2019-11-01

## 2019-11-01 VITALS
TEMPERATURE: 97.8 F | HEART RATE: 95 BPM | SYSTOLIC BLOOD PRESSURE: 146 MMHG | RESPIRATION RATE: 16 BRPM | DIASTOLIC BLOOD PRESSURE: 68 MMHG

## 2019-11-01 DIAGNOSIS — M81.0 AGE-RELATED OSTEOPOROSIS WITHOUT CURRENT PATHOLOGICAL FRACTURE: Primary | ICD-10-CM

## 2019-11-01 PROCEDURE — 25010000002 DENOSUMAB 60 MG/ML SOLUTION PREFILLED SYRINGE: Performed by: GENERAL PRACTICE

## 2019-11-01 PROCEDURE — 96372 THER/PROPH/DIAG INJ SC/IM: CPT | Performed by: NURSE PRACTITIONER

## 2019-11-01 RX ADMIN — DENOSUMAB 60 MG: 60 INJECTION SUBCUTANEOUS at 08:34

## 2019-11-13 DIAGNOSIS — Z12.31 ENCOUNTER FOR SCREENING MAMMOGRAM FOR MALIGNANT NEOPLASM OF BREAST: Primary | ICD-10-CM

## 2019-12-13 RX ORDER — METOPROLOL SUCCINATE 25 MG/1
TABLET, EXTENDED RELEASE ORAL
Qty: 30 TABLET | Refills: 5 | Status: SHIPPED | OUTPATIENT
Start: 2019-12-13 | End: 2020-06-03 | Stop reason: SDUPTHER

## 2020-01-03 ENCOUNTER — OFFICE VISIT (OUTPATIENT)
Dept: FAMILY MEDICINE CLINIC | Facility: CLINIC | Age: 75
End: 2020-01-03

## 2020-01-03 VITALS
HEIGHT: 63 IN | DIASTOLIC BLOOD PRESSURE: 71 MMHG | OXYGEN SATURATION: 99 % | SYSTOLIC BLOOD PRESSURE: 115 MMHG | WEIGHT: 160.7 LBS | BODY MASS INDEX: 28.47 KG/M2 | HEART RATE: 98 BPM

## 2020-01-03 DIAGNOSIS — R93.7 ABNORMAL X-RAY OF LUMBAR SPINE: ICD-10-CM

## 2020-01-03 DIAGNOSIS — M54.40 BACK PAIN OF LUMBAR REGION WITH SCIATICA: Primary | ICD-10-CM

## 2020-01-03 PROCEDURE — 99213 OFFICE O/P EST LOW 20 MIN: CPT | Performed by: GENERAL PRACTICE

## 2020-01-03 NOTE — PROGRESS NOTES
Subjective   Robert Mimi Larios is a 74 y.o. female.   Chief Complaint   Patient presents with   • abnormal xray   • Back Pain     Has had problems with sciatica for many many years, saw, follow-up with the chiropractor who did x-rays.  He saw something on the x-ray he could not explain and therefore she is here to review this to make sure it is not anything worrisome.  Back Pain   This is a chronic problem. The current episode started more than 1 year ago. The problem has been waxing and waning since onset. The pain is present in the lumbar spine. The pain radiates to the right thigh. The pain is at a severity of 0/10. The pain is mild. The pain is the same all the time. The symptoms are aggravated by standing and position. Pertinent negatives include no abdominal pain, chest pain, dysuria, fever, headaches, numbness or weakness.      The following portions of the patient's history were reviewed and updated as appropriate: allergies, current medications, past social history and problem list.    Outpatient Medications Prior to Visit   Medication Sig Dispense Refill   • aluminum hydroxide-mag carbonate (GAVISCON EXTRA RELIEF) 160-105 MG chewable tablet chewable tablet Chew Daily.     • aspirin 81 MG EC tablet Take 81 mg by mouth 2 (Two) Times a Week.     • Calcium Carbonate-Vit D-Min (CALTRATE PLUS PO) Take 1 tablet by mouth 2 (Two) Times a Day.     • Cholecalciferol (VITAMIN D3) 5000 units capsule capsule Take 4,000 Units by mouth Daily.     • esomeprazole (nexIUM) 20 MG capsule Take 20 mg by mouth 2 (Two) Times a Day.     • folic acid (FOLVITE) 400 MCG tablet Take 400 mcg by mouth Daily.     • loperamide (IMODIUM) 2 MG capsule Take 2 mg by mouth As Needed for diarrhea (2 times per week).     • metoprolol succinate XL (TOPROL-XL) 25 MG 24 hr tablet TAKE ONE TABLET BY MOUTH DAILY 30 tablet 5   • NON FORMULARY 30mg vitamin B-6  400 mcg vitamin B-12  350 mg DHA  150 mg EPA  200 mg L-Carnitine  50mg CO-Q 10  30 mg  "trans-resveratrol     • Omega-3 350 MG capsule delayed-release        No facility-administered medications prior to visit.        Review of Systems   Constitutional: Negative.  Negative for chills, fatigue, fever and unexpected weight change.   HENT: Negative.  Negative for congestion, ear pain, hearing loss, nosebleeds, rhinorrhea, sneezing, sore throat and tinnitus.    Eyes: Negative.  Negative for discharge.   Respiratory: Negative.  Negative for cough, shortness of breath and wheezing.    Cardiovascular: Negative.  Negative for chest pain and palpitations.   Gastrointestinal: Negative.  Negative for abdominal pain, constipation, diarrhea, nausea and vomiting.   Endocrine: Negative.    Genitourinary: Negative.  Negative for dysuria, frequency and urgency.   Musculoskeletal: Positive for back pain. Negative for arthralgias, joint swelling, myalgias and neck pain.   Skin: Negative.  Negative for rash.   Allergic/Immunologic: Negative.    Neurological: Negative.  Negative for dizziness, weakness, numbness and headaches.   Hematological: Negative.  Negative for adenopathy.   Psychiatric/Behavioral: Negative.  Negative for dysphoric mood and sleep disturbance. The patient is not nervous/anxious.        Objective   Visit Vitals  /71 (BP Location: Left arm) Comment: Patient check this at home, has whitecoat syndrome   Pulse 98   Ht 158.8 cm (62.5\")   Wt 72.9 kg (160 lb 11.2 oz)   SpO2 99%   BMI 28.92 kg/m²     Physical Exam   Constitutional: She is oriented to person, place, and time. She appears well-developed and well-nourished. No distress.   HENT:   Head: Normocephalic and atraumatic.   Nose: Nose normal.   Mouth/Throat: Oropharynx is clear and moist.   Eyes: Pupils are equal, round, and reactive to light. Conjunctivae and EOM are normal. Right eye exhibits no discharge. Left eye exhibits no discharge.   Neck: No thyromegaly present.   Cardiovascular: Normal rate, regular rhythm, normal heart sounds and intact " distal pulses.   Pulmonary/Chest: Effort normal and breath sounds normal.   Musculoskeletal:        Lumbar back: She exhibits tenderness.   Straight leg raise + bilat 90   Lymphadenopathy:     She has no cervical adenopathy.   Neurological: She is alert and oriented to person, place, and time.   Skin: Skin is warm and dry.   Psychiatric: She has a normal mood and affect.   Nursing note and vitals reviewed.    Assessment/Plan   Problem List Items Addressed This Visit     None      Visit Diagnoses     Back pain of lumbar region with sciatica    -  Primary    Relevant Orders    XR Spine Lumbar 4+ View    Abnormal x-ray of lumbar spine             Will notify regarding results.     No orders of the defined types were placed in this encounter.    Return if symptoms worsen or fail to improve, for Next scheduled follow up.

## 2020-01-16 PROBLEM — K58.9 IRRITABLE BOWEL SYNDROME: Status: ACTIVE | Noted: 2020-01-16

## 2020-05-01 ENCOUNTER — APPOINTMENT (OUTPATIENT)
Dept: ONCOLOGY | Facility: HOSPITAL | Age: 75
End: 2020-05-01

## 2020-06-05 RX ORDER — METOPROLOL SUCCINATE 25 MG/1
25 TABLET, EXTENDED RELEASE ORAL DAILY
Qty: 30 TABLET | Refills: 5 | Status: SHIPPED | OUTPATIENT
Start: 2020-06-05 | End: 2020-11-02 | Stop reason: SDUPTHER

## 2020-06-08 ENCOUNTER — INFUSION (OUTPATIENT)
Dept: ONCOLOGY | Facility: HOSPITAL | Age: 75
End: 2020-06-08

## 2020-06-08 ENCOUNTER — LAB (OUTPATIENT)
Dept: ONCOLOGY | Facility: HOSPITAL | Age: 75
End: 2020-06-08

## 2020-06-08 VITALS — SYSTOLIC BLOOD PRESSURE: 151 MMHG | TEMPERATURE: 97.1 F | HEART RATE: 84 BPM | DIASTOLIC BLOOD PRESSURE: 74 MMHG

## 2020-06-08 DIAGNOSIS — M81.0 AGE-RELATED OSTEOPOROSIS WITHOUT CURRENT PATHOLOGICAL FRACTURE: Primary | ICD-10-CM

## 2020-06-08 LAB
CALCIUM SPEC-SCNC: 9.5 MG/DL (ref 8.6–10.5)
MAGNESIUM SERPL-MCNC: 2.2 MG/DL (ref 1.6–2.4)
PHOSPHATE SERPL-MCNC: 3.1 MG/DL (ref 2.5–4.5)

## 2020-06-08 PROCEDURE — 83735 ASSAY OF MAGNESIUM: CPT | Performed by: GENERAL PRACTICE

## 2020-06-08 PROCEDURE — 96372 THER/PROPH/DIAG INJ SC/IM: CPT | Performed by: NURSE PRACTITIONER

## 2020-06-08 PROCEDURE — 36415 COLL VENOUS BLD VENIPUNCTURE: CPT | Performed by: NURSE PRACTITIONER

## 2020-06-08 PROCEDURE — 82310 ASSAY OF CALCIUM: CPT | Performed by: GENERAL PRACTICE

## 2020-06-08 PROCEDURE — 84100 ASSAY OF PHOSPHORUS: CPT | Performed by: GENERAL PRACTICE

## 2020-06-08 PROCEDURE — 25010000002 DENOSUMAB 60 MG/ML SOLUTION PREFILLED SYRINGE: Performed by: GENERAL PRACTICE

## 2020-06-08 RX ADMIN — DENOSUMAB 60 MG: 60 INJECTION SUBCUTANEOUS at 11:29

## 2020-06-15 ENCOUNTER — APPOINTMENT (OUTPATIENT)
Dept: ONCOLOGY | Facility: HOSPITAL | Age: 75
End: 2020-06-15

## 2020-06-24 DIAGNOSIS — M53.3 CHRONIC SI JOINT PAIN: Primary | ICD-10-CM

## 2020-06-24 DIAGNOSIS — G89.29 CHRONIC SI JOINT PAIN: Primary | ICD-10-CM

## 2020-06-30 ENCOUNTER — HOSPITAL ENCOUNTER (OUTPATIENT)
Dept: PHYSICAL THERAPY | Facility: HOSPITAL | Age: 75
Setting detail: THERAPIES SERIES
Discharge: HOME | End: 2020-06-30

## 2020-06-30 DIAGNOSIS — M53.3 CHRONIC SI JOINT PAIN: Primary | ICD-10-CM

## 2020-06-30 DIAGNOSIS — G89.29 CHRONIC SI JOINT PAIN: Primary | ICD-10-CM

## 2020-06-30 PROCEDURE — 97162 PT EVAL MOD COMPLEX 30 MIN: CPT | Performed by: PHYSICAL THERAPIST

## 2020-07-01 ENCOUNTER — APPOINTMENT (OUTPATIENT)
Dept: PHYSICAL THERAPY | Facility: HOSPITAL | Age: 75
End: 2020-07-01

## 2020-07-06 ENCOUNTER — HOSPITAL ENCOUNTER (OUTPATIENT)
Dept: PHYSICAL THERAPY | Facility: HOSPITAL | Age: 75
Setting detail: THERAPIES SERIES
Discharge: HOME | End: 2020-07-06

## 2020-07-06 DIAGNOSIS — M53.3 CHRONIC SI JOINT PAIN: Primary | ICD-10-CM

## 2020-07-06 DIAGNOSIS — G89.29 CHRONIC SI JOINT PAIN: Primary | ICD-10-CM

## 2020-07-06 PROCEDURE — 97110 THERAPEUTIC EXERCISES: CPT

## 2020-07-06 NOTE — THERAPY TREATMENT NOTE
Outpatient Physical Therapy Ortho Treatment Note  [facility]     Patient Name: Robert Larios  : 1945  MRN: [id number]  Today's Date: 2020      Visit Date: 2020   Attendance:   Subjective improvement: n/a  Recert: 20  MD Appointment: 20      Visit Dx:    ICD-10-CM ICD-9-CM   1. Chronic SI joint pain M53.3 724.6    G89.29 338.29       Patient Active Problem List   Diagnosis   • Hyperlipidemia   • GERD (gastroesophageal reflux disease)   • Essential hypertension   • Degenerative joint disease involving multiple joints   • Constipation   • Nuclear cataract   • Episcleritis   • Corneal ulcer, marginal   • Palpitation   • Abnormal EKG   • Dizziness   • Precordial pain   • Osteoporosis   • Irritable bowel syndrome        Past Medical History:   Diagnosis Date   • Acute otitis media 09/10/2015   • Acute pharyngitis 09/10/2015   • Acute sinusitis 09/10/2015   • Biliary colic 10/17/2014    STONES BY REVIEW US   • Breast cyst    • Change in bowel habits 2016   • Chest rales 5    MEDIUM   • Cholelithiasis without obstruction 10/17/2014   • Chronic cholecystitis 10/17/2014   • Constipation 2015   • Degenerative joint disease involving multiple joints 09/10/2014   • Elevated blood-pressure reading without diagnosis of hypertension 2012    BLOOD PRESSURE NORMAL AT HOME   • Epigastric pain 09/10/2014   • Essential hypertension 2015   • Fibrocystic breast    • Gastritis 2015   • GERD (gastroesophageal reflux disease) 2015   • Hammer toe 2015   • History of colonoscopy 2014    Information taken from [facility]    • Hyperlipidemia 2015   • Ingrowing nail [id number]   • Ingrown toenail    • Irritable bowel disease    • Pain in right foot 2015   • Peripheral neuropathy 09/10/2014   • Plantar fasciitis         Past Surgical History:   Procedure Laterality Date   • BREAST BIOPSY  2004    R mammotome biop w/ image guided  placement of metallic localization clip & stereotactic location & guidance for breast biop. radiologic examination of the surgical specimen & unilateral mammography   • BREAST CYST ASPIRATION     • CHOLECYSTECTOMY  10/09/2014   • COLONOSCOPY  08/28/2014    Hemorrhoids found.   • COLONOSCOPY N/A 12/6/2018    Procedure: COLONOSCOPY;  Surgeon: Everardo Beltran DO;  Location: [facility] ENDOSCOPY;  Service: Gastroenterology   • DILATATION AND CURETTAGE  08/28/2014    Postmenopausal bleeding   • ENDOSCOPY  08/28/2014    Normal hypopharynx, esophagus. A hiatus hernia found in the GE junction.Non-erosive gastritis found inthe stomach. Multiple biopsies taken.Moderate amount of bile sained fluid in the stomach.Normal, symmetrical, and patent pylorus.Normal duodenum.   • INJECTION OF MEDICATION  07/30/2015    KENALOG(1)   • TONSILLECTOMY         PT Ortho     Row Name 07/06/20 1400       Precautions and Contraindications    Precautions/Limitations  no known precautions/limitations  -EM       Posture/Observations    Posture/Observations Comments  Pt amb with FF posture. Pt reports TTP at R SI joint. Pt reports freq spasms in L-Spine and sometimes in R 5th toe.   -EM      User Key  (r) = Recorded By, (t) = Taken By, (c) = Cosigned By    Initials Name Provider Type    EM Yadiel Guy, PTA Physical Therapy Assistant                      PT Assessment/Plan     Row Name 07/06/20 1400          PT Assessment    Functional Limitations  Limitation in home management;Limitations in community activities;Limitations in functional capacity and performance;Performance in leisure activities;Performance in self-care ADL  -EM     Impairments  Pain;Range of motion;Joint mobility  -EM     Assessment Comments  Pt leslie tx well with good response to todays therex regime with improved pain post tx.   -EM     Rehab Potential  Good  -EM     Patient/caregiver participated in establishment of treatment plan and goals  Yes  -EM     Patient would benefit  "from skilled therapy intervention  Yes  -EM        PT Plan    PT Frequency  2x/week  -EM     Predicted Duration of Therapy Intervention (Therapy Eval)  4-6 wks  -EM     PT Plan Comments  Add MHP to L-Spine next tx. Issue updated HEP. Cont to progress as leslie.   -EM       User Key  (r) = Recorded By, (t) = Taken By, (c) = Cosigned By    Initials Name Provider Type    Yadiel Quiros, RYAN Physical Therapy Assistant          Modalities     Row Name 07/06/20 1400             Subjective Pain    Post-Treatment Pain Level  0  -EM        User Key  (r) = Recorded By, (t) = Taken By, (c) = Cosigned By    Initials Name Provider Type    Yadiel Quiros, RYAN Physical Therapy Assistant        OP Exercises     Row Name 07/06/20 1400             Subjective Comments    Subjective Comments  Pt states she has increased pain when standing too long. Pt also states she also has increased pain initially when sitting due to spasms. Pt states she has to ease into extension.   -EM         Subjective Pain    Able to rate subjective pain?  yes  -EM      Pre-Treatment Pain Level  3  -EM      Post-Treatment Pain Level  0  -EM         Exercise 1    Exercise Name 1  PRO II  -EM      Time 1  10'  -EM         Exercise 2    Exercise Name 2  B St Ham S  -EM         Exercise 3    Exercise Name 3  R Seated Piriformis S   -EM      Sets 3  3  -EM      Time 3  30\"  -EM         Exercise 4    Exercise Name 4  DKTC  -EM      Sets 4  3  -EM      Time 4  30\"  -EM         Exercise 5    Exercise Name 5  KIP  -EM      Sets 5  1  -EM      Reps 5  20  -EM      Time 5  5\" Hold  -EM        User Key  (r) = Recorded By, (t) = Taken By, (c) = Cosigned By    Initials Name Provider Type    Yadiel Quiros, RYAN Physical Therapy Assistant                       PT OP Goals     Row Name 07/06/20 1400          PT Short Term Goals    STG Date to Achieve  07/21/20  -EM     STG 1  Note a >/= 50% subjective improvement.   -EM     STG 1 Progress  Not Met  -EM     STG 2  " Increase trunk flexion to >/= 95 deg.  -EM     STG 2 Progress  Not Met  -EM     STG 3  Increase trunk L lateral flexion to >/= 20 deg.  -EM     STG 3 Progress  Not Met  -EM        Long Term Goals    LTG Date to Achieve  08/11/20  -EM     LTG 1  Independent with HEP/self-management.   -EM     LTG 1 Progress  Not Met  -EM     LTG 2  TROM WFLs with minimal to no pain.  -EM     LTG 2 Progress  Not Met  -EM     LTG 3  Resume PLOF with minimal symptoms.  -EM     LTG 3 Progress  Not Met  -EM       User Key  (r) = Recorded By, (t) = Taken By, (c) = Cosigned By    Initials Name Provider Type    EM Yadiel Guy PTA Physical Therapy Assistant                         Time Calculation:   Start Time: 1345  Stop Time: 1430  Time Calculation (min): 45 min  Total Timed Code Minutes- PT: 45 minute(s)  Therapy Charges for Today     Code Description Service Date Service Provider Modifiers Qty    09207356036 HC PT THER PROC EA 15 MIN 7/6/2020 Yadiel Guy PTA GP 3                    Yadiel Guy PTA  7/6/2020

## 2020-07-09 ENCOUNTER — HOSPITAL ENCOUNTER (OUTPATIENT)
Dept: PHYSICAL THERAPY | Facility: HOSPITAL | Age: 75
Setting detail: THERAPIES SERIES
Discharge: HOME | End: 2020-07-09

## 2020-07-09 DIAGNOSIS — M53.3 CHRONIC SI JOINT PAIN: Primary | ICD-10-CM

## 2020-07-09 DIAGNOSIS — G89.29 CHRONIC SI JOINT PAIN: Primary | ICD-10-CM

## 2020-07-09 PROCEDURE — 97110 THERAPEUTIC EXERCISES: CPT

## 2020-07-09 NOTE — THERAPY TREATMENT NOTE
Outpatient Physical Therapy Ortho Treatment Note  [facility]     Patient Name: Robert Larios  : 1945  MRN: [id number]  Today's Date: 2020      Visit Date: 2020   Attendance:3/3  Subjective improvement:n/a  Recert: 20  MD Appointment: 20      Visit Dx:    ICD-10-CM ICD-9-CM   1. Chronic SI joint pain M53.3 724.6    G89.29 338.29       Patient Active Problem List   Diagnosis   • Hyperlipidemia   • GERD (gastroesophageal reflux disease)   • Essential hypertension   • Degenerative joint disease involving multiple joints   • Constipation   • Nuclear cataract   • Episcleritis   • Corneal ulcer, marginal   • Palpitation   • Abnormal EKG   • Dizziness   • Precordial pain   • Osteoporosis   • Irritable bowel syndrome        Past Medical History:   Diagnosis Date   • Acute otitis media 09/10/2015   • Acute pharyngitis 09/10/2015   • Acute sinusitis 09/10/2015   • Biliary colic 10/17/2014    STONES BY REVIEW US   • Breast cyst    • Change in bowel habits 2016   • Chest rales 5    MEDIUM   • Cholelithiasis without obstruction 10/17/2014   • Chronic cholecystitis 10/17/2014   • Constipation 2015   • Degenerative joint disease involving multiple joints 09/10/2014   • Elevated blood-pressure reading without diagnosis of hypertension 2012    BLOOD PRESSURE NORMAL AT HOME   • Epigastric pain 09/10/2014   • Essential hypertension 2015   • Fibrocystic breast    • Gastritis 2015   • GERD (gastroesophageal reflux disease) 2015   • Hammer toe 2015   • History of colonoscopy 2014    [facility]    • Hyperlipidemia 2015   • Ingrowing nail [id number]   • Ingrown toenail    • Irritable bowel disease    • Pain in right foot 2015   • Peripheral neuropathy 09/10/2014   • Plantar fasciitis         Past Surgical History:   Procedure Laterality Date   • BREAST BIOPSY  2004    R mammotome biop w/ image guided  placement of metallic localization clip & stereotactic location & guidance for breast biop. radiologic examination of the surgical specimen & unilateral mammography   • BREAST CYST ASPIRATION     • CHOLECYSTECTOMY  10/09/2014   • COLONOSCOPY  08/28/2014    Hemorrhoids found.   • COLONOSCOPY N/A 12/6/2018    Procedure: COLONOSCOPY;  Surgeon: Everardo Beltran DO;  Location: [facility] [place] ENDOSCOPY;  Service: Gastroenterology   • DILATATION AND CURETTAGE  08/28/2014    Postmenopausal bleeding   • ENDOSCOPY  08/28/2014    Normal hypopharynx, esophagus. A hiatus hernia found in the GE junction.Non-erosive gastritis found inthe stomach. Multiple biopsies taken.Moderate amount of bile sained fluid in the stomach.Normal, symmetrical, and patent pylorus.Normal duodenum.   • INJECTION OF MEDICATION  07/30/2015    KENALOG(1)   • TONSILLECTOMY         PT Ortho     Row Name 07/09/20 1400       Precautions and Contraindications    Precautions/Limitations  no known precautions/limitations  -EM       Posture/Observations    Posture/Observations Comments  Pt amb with improved TTP and gt quality this tx.   -EM      User Key  (r) = Recorded By, (t) = Taken By, (c) = Cosigned By    Initials Name Provider Type    EM Yadiel Guy, PTA Physical Therapy Assistant                      PT Assessment/Plan     Row Name 07/09/20 1500          PT Assessment    Functional Limitations  Limitation in home management;Limitations in community activities;Limitations in functional capacity and performance;Performance in leisure activities;Performance in self-care ADL  -EM     Impairments  Pain;Range of motion;Joint mobility  -EM     Assessment Comments  Pt leslie tx well with initiation of hip/core strengthening of hips and core. Pt subjectively reports a 65-70% improvement.   -EM     Rehab Potential  Good  -EM     Patient/caregiver participated in establishment of treatment plan and goals  Yes  -EM     Patient would benefit from skilled therapy intervention  " Yes  -EM        PT Plan    PT Frequency  2x/week  -EM     Predicted Duration of Therapy Intervention (Therapy Eval)  4-6 wks  -EM     PT Plan Comments  Cont hip/core stretching and strengthening. Progress as leslie.  -EM       User Key  (r) = Recorded By, (t) = Taken By, (c) = Cosigned By    Initials Name Provider Type    Yadiel Quiros PTA Physical Therapy Assistant          Modalities     Row Name 07/09/20 1400             Subjective Pain    Post-Treatment Pain Level  0  -EM        User Key  (r) = Recorded By, (t) = Taken By, (c) = Cosigned By    Initials Name Provider Type    EM Yadiel Guy, RYAN Physical Therapy Assistant        OP Exercises     Row Name 07/09/20 1400             Subjective Comments    Subjective Comments  Pt states she is doing better. \"I havnt had the spasms like I was. And not as much pain and tenderness. \" Pt does report that she cont to have some discomfort when getting into the vehicle.  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  yes  -EM      Pre-Treatment Pain Level  0  -EM      Post-Treatment Pain Level  0  -EM         Exercise 1    Exercise Name 1  PRO II- Progressive-4.0  -EM      Time 1  10'  -EM      Additional Comments  Seat 8  -EM         Exercise 2    Exercise Name 2  B St Ham S  -EM      Sets 2  3  -EM      Time 2  30\"  -EM         Exercise 3    Exercise Name 3  R Seated Piriformis S   -EM      Sets 3  5  -EM      Time 3  30\"  -EM         Exercise 4    Exercise Name 4  QL S  -EM      Sets 4  3  -EM      Time 4  30\"  -EM         Exercise 5    Exercise Name 5  LTR  -EM      Sets 5  1  -EM      Reps 5  30  -EM         Exercise 6    Exercise Name 6  Bridges  -EM      Sets 6  1  -EM      Reps 6  20  -EM         Exercise 7    Exercise Name 7  DKTC  -EM      Sets 7  3  -EM      Time 7  30\"  -EM         Exercise 8    Exercise Name 8  KIP  -EM      Sets 8  1  -EM      Reps 8  12  " "-EM      Time 8  10-20\" Hold  -EM         Exercise 9    Exercise Name 9  B Prone/SL 3 Way SLR  -EM      Sets 9  2  -EM      Reps 9  10  -EM         Exercise 10    Exercise Name 10  B  SL Clamshells  -EM      Sets 10  1  -EM      Reps 10  20  -EM         Exercise 11    Exercise Name 11  Prone MHP T-L Spine.  -EM      Time 11  10'  -EM        User Key  (r) = Recorded By, (t) = Taken By, (c) = Cosigned By    Initials Name Provider Type    Yadiel Quiros, RYAN Physical Therapy Assistant                       PT OP Goals     Row Name 07/09/20 1500          PT Short Term Goals    STG Date to Achieve  07/21/20  -EM     STG 1  Note a >/= 50% subjective improvement.   -EM     STG 1 Progress  (S) Met  -EM     STG 2  Increase trunk flexion to >/= 95 deg.  -EM     STG 2 Progress  Not Met  -EM     STG 3  Increase trunk L lateral flexion to >/= 20 deg.  -EM     STG 3 Progress  Not Met  -EM        Long Term Goals    LTG Date to Achieve  08/11/20  -EM     LTG 1  Independent with HEP/self-management.   -EM     LTG 1 Progress  Not Met  -EM     LTG 2  TROM WFLs with minimal to no pain.  -EM     LTG 2 Progress  Not Met  -EM     LTG 3  Resume PLOF with minimal symptoms.  -EM     LTG 3 Progress  Not Met  -EM        Time Calculation    PT Goal Re-Cert Due Date  07/21/20  -EM       User Key  (r) = Recorded By, (t) = Taken By, (c) = Cosigned By    Initials Name Provider Type    Yadiel Quiros PTA Physical Therapy Assistant          Therapy Education  Education Details: Updated HEP Issued: Piriforimis S, Ham S, QL S, LTR, DKTC, Prone/sup/SL 3 Way SLR,  MHP vs cryotherapy use  Given: HEP  Program: Progressed  How Provided: Verbal, Demonstration, Written  Provided to: Patient  Level of Understanding: Verbalized, Demonstrated              Time Calculation:   Start Time: 1426  Stop Time: 1536  Time Calculation (min): 70 min  PT Non-Billable Time (min): 10 min  Total Timed Code Minutes- PT: 60 minute(s)  Therapy Charges for Today     Code " Description Service Date Service Provider Modifiers Qty    30548093970 HC PT THER PROC EA 15 MIN 7/9/2020 Yadiel Guy, PTA GP 4    71590842093 HC PT THER SUPP EA 15 MIN 7/9/2020 Yadiel Guy, PTA GP 1                    Yadiel Guy, PTA  7/9/2020

## 2020-07-13 ENCOUNTER — HOSPITAL ENCOUNTER (OUTPATIENT)
Dept: PHYSICAL THERAPY | Facility: HOSPITAL | Age: 75
Setting detail: THERAPIES SERIES
Discharge: HOME | End: 2020-07-13

## 2020-07-13 DIAGNOSIS — M53.3 CHRONIC SI JOINT PAIN: Primary | ICD-10-CM

## 2020-07-13 DIAGNOSIS — G89.29 CHRONIC SI JOINT PAIN: Primary | ICD-10-CM

## 2020-07-13 PROCEDURE — 97110 THERAPEUTIC EXERCISES: CPT

## 2020-07-13 NOTE — THERAPY TREATMENT NOTE
"    Outpatient Physical Therapy Ortho Treatment Note  [facility]     Patient Name: Robert Larios  : 1945  MRN: [id number]  Today's Date: 2020      Visit Date: 2020   Attendance:   Subjective improvement: \"Better\"  Recert: 20  MD Appointment: 20      Visit Dx:    ICD-10-CM ICD-9-CM   1. Chronic SI joint pain M53.3 724.6    G89.29 338.29       Patient Active Problem List   Diagnosis   • Hyperlipidemia   • GERD (gastroesophageal reflux disease)   • Essential hypertension   • Degenerative joint disease involving multiple joints   • Constipation   • Nuclear cataract   • Episcleritis   • Corneal ulcer, marginal   • Palpitation   • Abnormal EKG   • Dizziness   • Precordial pain   • Osteoporosis   • Irritable bowel syndrome        Past Medical History:   Diagnosis Date   • Acute otitis media 09/10/2015   • Acute pharyngitis 09/10/2015   • Acute sinusitis 09/10/2015   • Biliary colic 10/17/2014    STONES BY REVIEW US   • Breast cyst    • Change in bowel habits 2016   • Chest rales     MEDIUM   • Cholelithiasis without obstruction 10/17/2014   • Chronic cholecystitis 10/17/2014   • Constipation 2015   • Degenerative joint disease involving multiple joints 09/10/2014   • Elevated blood-pressure reading without diagnosis of hypertension 2012    BLOOD PRESSURE NORMAL AT HOME   • Epigastric pain 09/10/2014   • Essential hypertension 2015   • Fibrocystic breast    • Gastritis 2015   • GERD (gastroesophageal reflux disease) 2015   • Hammer toe 2015   • History of colonoscopy 2014    Information taken from [facility]    • Hyperlipidemia 2015   • Ingrowing nail [id number]   • Ingrown toenail    • Irritable bowel disease    • Pain in right foot 2015   • Peripheral neuropathy 09/10/2014   • Plantar fasciitis         Past Surgical History:   Procedure Laterality Date   • BREAST BIOPSY  2004    R mammotome biop w/ image guided " placement of metallic localization clip & stereotactic location & guidance for breast biop. radiologic examination of the surgical specimen & unilateral mammography   • BREAST CYST ASPIRATION     • CHOLECYSTECTOMY  10/09/2014   • COLONOSCOPY  08/28/2014    Hemorrhoids found.   • COLONOSCOPY N/A 12/6/2018    Procedure: COLONOSCOPY;  Surgeon: Everardo Beltran DO;  Location: [facility] ENDOSCOPY;  Service: Gastroenterology   • DILATATION AND CURETTAGE  08/28/2014    Postmenopausal bleeding   • ENDOSCOPY  08/28/2014    Normal hypopharynx, esophagus. A hiatus hernia found in the GE junction.Non-erosive gastritis found inthe stomach. Multiple biopsies taken.Moderate amount of bile sained fluid in the stomach.Normal, symmetrical, and patent pylorus.Normal duodenum.   • INJECTION OF MEDICATION  07/30/2015    KENALOG(1)   • TONSILLECTOMY         PT Ortho     Row Name 07/13/20 1500       Subjective Comments    Subjective Comments  Pt states she is feeling stiffer today. Pt states she is having decreased muscle spasms.  Pt c/o increased TTP in SI region when sitting against a hard back chair. Pt states she modified how she enters and exits the vehicle(to prevent twisting).  Pt compliant with HEP.  -EM       Precautions and Contraindications    Precautions/Limitations  no known precautions/limitations  -EM      User Key  (r) = Recorded By, (t) = Taken By, (c) = Cosigned By    Initials Name Provider Type    EM Yadiel Guy, PTA Physical Therapy Assistant                      PT Assessment/Plan     Row Name 07/13/20 1500          PT Assessment    Functional Limitations  Limitation in home management;Limitations in community activities;Limitations in functional capacity and performance;Performance in leisure activities;Performance in self-care ADL  -EM     Impairments  Pain;Range of motion;Joint mobility  -EM     Assessment Comments  Pt leslie tx well with progressed therex regime with no increased pain throughtout tx.   -EM      "Rehab Potential  Good  -EM     Patient/caregiver participated in establishment of treatment plan and goals  Yes  -EM     Patient would benefit from skilled therapy intervention  Yes  -EM        PT Plan    PT Frequency  2x/week  -EM     Predicted Duration of Therapy Intervention (Therapy Eval)  4-6 wks  -EM     PT Plan Comments  ROM measurements next tx. Cont hip and core strengthening. Update HEP if leslie todays tx well.  -EM       User Key  (r) = Recorded By, (t) = Taken By, (c) = Cosigned By    Initials Name Provider Type    EM Yadiel Guy, RYAN Physical Therapy Assistant            OP Exercises     Row Name 07/13/20 1500             Subjective Comments    Subjective Comments  Pt states she is feeling stiffer today. Pt states she is having decreased muscle spasms.  Pt c/o increased TTP in SI region when sitting against a hard back chair. Pt states she modified how she enters and exits the vehicle(to prevent twisting).  Pt compliant with HEP.  -EM         Subjective Pain    Able to rate subjective pain?  yes  -EM      Pre-Treatment Pain Level  3  -EM         Exercise 1    Exercise Name 1  PRO II- Progressive-4.0  -EM         Exercise 2    Exercise Name 2  B St Ham S  -EM      Sets 2  3  -EM      Time 2  30\"  -EM         Exercise 3    Exercise Name 3  R Seated Piriformis S   -EM      Sets 3  3  -EM      Time 3  30\"  -EM         Exercise 4    Exercise Name 4  QL S  -EM      Sets 4  3  -EM      Time 4  30\"  -EM         Exercise 5    Exercise Name 5  St B SLR  -EM      Sets 5  1  -EM      Reps 5  20  -EM         Exercise 6    Exercise Name 6  St Marches  -EM      Sets 6  1  -EM      Reps 6  20  -EM         Exercise 7    Exercise Name 7  Bridges  -EM      Sets 7  1  -EM      Reps 7  30  -EM         Exercise 8    Exercise Name 8  Quadruped Fire Hydrant  -EM      Sets 8  1  -EM      Reps 8  20  -EM        User Key  (r) = Recorded By, (t) = Taken By, (c) = Cosigned By    Initials Name Provider Type    EM Yadiel Guy, " PTA Physical Therapy Assistant                       PT OP Goals     Row Name 07/13/20 1500          PT Short Term Goals    STG Date to Achieve  07/21/20  -EM     STG 1  Note a >/= 50% subjective improvement.   -EM     STG 1 Progress  (S) Met  -EM     STG 2  Increase trunk flexion to >/= 95 deg.  -EM     STG 2 Progress  Not Met  -EM     STG 3  Increase trunk L lateral flexion to >/= 20 deg.  -EM     STG 3 Progress  Not Met  -EM        Long Term Goals    LTG Date to Achieve  08/11/20  -EM     LTG 1  Independent with HEP/self-management.   -EM     LTG 1 Progress  Not Met  -EM     LTG 2  TROM WFLs with minimal to no pain.  -EM     LTG 2 Progress  Not Met  -EM     LTG 3  Resume PLOF with minimal symptoms.  -EM     LTG 3 Progress  Not Met  -EM        Time Calculation    PT Goal Re-Cert Due Date  07/21/20  -EM       User Key  (r) = Recorded By, (t) = Taken By, (c) = Cosigned By    Initials Name Provider Type    EM Yadiel Guy PTA Physical Therapy Assistant                         Time Calculation:   Start Time: 1516  Stop Time: 1559  Time Calculation (min): 43 min  Total Timed Code Minutes- PT: 43 minute(s)  Therapy Charges for Today     Code Description Service Date Service Provider Modifiers Qty    57932578681 HC PT THER PROC EA 15 MIN 7/13/2020 Yadiel Guy PTA GP 3                    Yadiel Guy PTA  7/13/2020

## 2020-07-16 ENCOUNTER — HOSPITAL ENCOUNTER (OUTPATIENT)
Dept: PHYSICAL THERAPY | Facility: HOSPITAL | Age: 75
Setting detail: THERAPIES SERIES
Discharge: HOME | End: 2020-07-16

## 2020-07-16 DIAGNOSIS — M53.3 CHRONIC SI JOINT PAIN: Primary | ICD-10-CM

## 2020-07-16 DIAGNOSIS — G89.29 CHRONIC SI JOINT PAIN: Primary | ICD-10-CM

## 2020-07-16 PROCEDURE — 97110 THERAPEUTIC EXERCISES: CPT

## 2020-07-16 NOTE — THERAPY TREATMENT NOTE
"    Outpatient Physical Therapy Ortho Treatment Note  [facility]     Patient Name: Robert Larios  : 1945  MRN: [id number]  Today's Date: 2020      Visit Date: 2020     ATTENDANCE:   SUBJECTIVE IMPROVEMENT: \"60-70%\"  NEXT MD APPOINTMENT: 2020  RECERT DATE: 2020    THERAPY DIAGNOSIS: SI pain       Visit Dx:    ICD-10-CM ICD-9-CM   1. Chronic SI joint pain M53.3 724.6    G89.29 338.29       Patient Active Problem List   Diagnosis   • Hyperlipidemia   • GERD (gastroesophageal reflux disease)   • Essential hypertension   • Degenerative joint disease involving multiple joints   • Constipation   • Nuclear cataract   • Episcleritis   • Corneal ulcer, marginal   • Palpitation   • Abnormal EKG   • Dizziness   • Precordial pain   • Osteoporosis   • Irritable bowel syndrome        Past Medical History:   Diagnosis Date   • Acute otitis media 09/10/2015   • Acute pharyngitis 09/10/2015   • Acute sinusitis 09/10/2015   • Biliary colic 10/17/2014    STONES BY REVIEW US   • Breast cyst    • Change in bowel habits 2016   • Chest rales 5    MEDIUM   • Cholelithiasis without obstruction 10/17/2014   • Chronic cholecystitis 10/17/2014   • Constipation 2015   • Degenerative joint disease involving multiple joints 09/10/2014   • Elevated blood-pressure reading without diagnosis of hypertension 2012    BLOOD PRESSURE NORMAL AT HOME   • Epigastric pain 09/10/2014   • Essential hypertension 2015   • Fibrocystic breast    • Gastritis 2015   • GERD (gastroesophageal reflux disease) 2015   • Hammer toe 2015   • History of colonoscopy 2014    Information taken from [facility]    • Hyperlipidemia 2015   • Ingrowing nail [id number]   • Ingrown toenail    • Irritable bowel disease    • Pain in right foot 2015   • Peripheral neuropathy 09/10/2014   • Plantar fasciitis         Past Surgical History:   Procedure Laterality Date   • BREAST BIOPSY  " 11/22/2004    R mammotome biop w/ image guided placement of metallic localization clip & stereotactic location & guidance for breast biop. radiologic examination of the surgical specimen & unilateral mammography   • BREAST CYST ASPIRATION     • CHOLECYSTECTOMY  10/09/2014   • COLONOSCOPY  08/28/2014    Hemorrhoids found.   • COLONOSCOPY N/A 12/6/2018    Procedure: COLONOSCOPY;  Surgeon: Everardo Beltran DO;  Location: [facility] ENDOSCOPY;  Service: Gastroenterology   • DILATATION AND CURETTAGE  08/28/2014    Postmenopausal bleeding   • ENDOSCOPY  08/28/2014    Normal hypopharynx, esophagus. A hiatus hernia found in the GE junction.Non-erosive gastritis found inthe stomach. Multiple biopsies taken.Moderate amount of bile sained fluid in the stomach.Normal, symmetrical, and patent pylorus.Normal duodenum.   • INJECTION OF MEDICATION  07/30/2015    KENALOG(1)   • TONSILLECTOMY         PT Ortho     Row Name 07/16/20 1400       Subjective Comments    Subjective Comments  The patient states that the pain is just enough to be annoying, Notes HEP exercises that are preformed bilaterally are causing soreness and pain on the left as well. Reports a couple instances of pain over the lateral hip as well, however states the pain is mainly still in the back.   -AC       Precautions and Contraindications    Precautions/Limitations  no known precautions/limitations  -AC       Subjective Pain    Able to rate subjective pain?  yes  -AC    Pre-Treatment Pain Level  2  -AC      User Key  (r) = Recorded By, (t) = Taken By, (c) = Cosigned By    Initials Name Provider Type    AC Marialuisa Pringle, PT Physical Therapist                      PT Assessment/Plan     Row Name 07/16/20 1500          PT Assessment    Functional Limitations  Limitation in home management;Limitations in functional capacity and performance;Performance in leisure activities;Performance in self-care ADL  -AC     Impairments  Range of motion;Pain;Muscle strength  -AC      Assessment Comments  Patient tolerated treatment well today. Upgraded clamshells to straight leg hip abduction, and added 2 lbs ankle weight to BLE. She completed side stepping activity with increased fatigue to the left.   -AC     Rehab Potential  Good  -AC     Patient/caregiver participated in establishment of treatment plan and goals  Yes  -AC     Patient would benefit from skilled therapy intervention  Yes  -AC        PT Plan    PT Frequency  2x/week  -AC     Predicted Duration of Therapy Intervention (Therapy Eval)  4-6 week  -AC     PT Plan Comments  ROM deferred to next treatment.   -AC       User Key  (r) = Recorded By, (t) = Taken By, (c) = Cosigned By    Initials Name Provider Type    AC Marialuisa Pringle, PT Physical Therapist            OP Exercises     Row Name 07/16/20 1400             Subjective Comments    Subjective Comments  The patient states that the pain is just enough to be annoying, Notes HEP exercises that are preformed bilaterally are causing soreness and pain on the left as well. Reports a couple instances of pain over the lateral hip as well, however states the pain is mainly still in the back.   -AC         Subjective Pain    Able to rate subjective pain?  yes  -AC      Pre-Treatment Pain Level  2  -AC         Exercise 1    Exercise Name 1  PRO II- seat 8 level 4   -AC      Time 1  10 minutes  -AC         Exercise 2    Exercise Name 2  standing hamstring stretch- BLE  -AC      Sets 2  3  -AC      Time 2  30 sec   -AC         Exercise 3    Exercise Name 3  Seated piriformis st  -AC      Sets 3  3  -AC      Time 3  30 sec  -AC         Exercise 4    Exercise Name 4  QL stretch   -AC      Sets 4  3  -AC      Reps 4     -AC      Time 4  30 seconds   -AC         Exercise 5    Exercise Name 5  Side lying hip abduction   -AC      Sets 5  2  -AC      Reps 5  20  -AC         Exercise 6    Exercise Name 6  bridging   -AC      Sets 6  2  -AC      Reps 6  20  -AC      Additional Comments  with  yellow theraband at knees   -AC         Exercise 7    Exercise Name 7  side stepping   -AC      Sets 7  3  -AC      Reps 7  10  -AC      Additional Comments  yellow theraband   -AC         Exercise 8    Exercise Name 8  Squats- against ball on wall   -AC      Sets 8  2  -AC      Reps 8  20   -AC         Exercise 9    Exercise Name 9  standing marching   -AC      Sets 9  2  -AC      Reps 9  20   -AC      Additional Comments  BLE- 2 lbs   -AC         Exercise 10    Exercise Name 10  standing hip extension   -AC      Sets 10  2  -AC      Reps 10  20   -AC        User Key  (r) = Recorded By, (t) = Taken By, (c) = Cosigned By    Initials Name Provider Type    AC Marialuisa Pringle, PT Physical Therapist                       PT OP Goals     Row Name 07/16/20 1400          PT Short Term Goals    STG Date to Achieve  07/21/20  -AC     STG 1  Note a >/= 50% subjective improvement.   -AC     STG 1 Progress  Met  -AC     STG 2  Increase trunk flexion to >/= 95 deg.  -AC     STG 2 Progress  Not Met  -AC     STG 3  Increase trunk L lateral flexion to >/= 20 deg.  -AC     STG 3 Progress  Not Met  -AC        Long Term Goals    LTG Date to Achieve  08/11/20  -AC     LTG 1  Independent with HEP/self-management.   -AC     LTG 1 Progress  Not Met  -AC     LTG 2  TROM WFLs with minimal to no pain.  -AC     LTG 2 Progress  Not Met  -AC     LTG 3  Resume PLOF with minimal symptoms.  -AC     LTG 3 Progress  Not Met  -AC        Time Calculation    PT Goal Re-Cert Due Date  07/21/20  -       User Key  (r) = Recorded By, (t) = Taken By, (c) = Cosigned By    Initials Name Provider Type    Marialuisa Roca, PT Physical Therapist                         Time Calculation:   Start Time: 1430  Stop Time: 1515  Time Calculation (min): 45 min  Therapy Charges for Today     Code Description Service Date Service Provider Modifiers Qty    96990060678 HC PT THER PROC EA 15 MIN 7/16/2020 Marialuisa Pringle, PT GP 3                    Marialuisa  Pino, PT  7/16/2020

## 2020-07-20 ENCOUNTER — HOSPITAL ENCOUNTER (OUTPATIENT)
Dept: PHYSICAL THERAPY | Facility: HOSPITAL | Age: 75
Setting detail: THERAPIES SERIES
Discharge: HOME | End: 2020-07-20

## 2020-07-20 DIAGNOSIS — M53.3 CHRONIC SI JOINT PAIN: Primary | ICD-10-CM

## 2020-07-20 DIAGNOSIS — G89.29 CHRONIC SI JOINT PAIN: Primary | ICD-10-CM

## 2020-07-20 PROCEDURE — 97110 THERAPEUTIC EXERCISES: CPT

## 2020-07-20 NOTE — THERAPY TREATMENT NOTE
Outpatient Physical Therapy Ortho Treatment Note  [facility]     Patient Name: Robert Larios  : 1945  MRN: [id number]  Today's Date: 2020      Visit Date: 2020     ATTENDANCE:   SUBJECTIVE IMPROVEMENT: 70%  NEXT MD APPOINTMENT: n/a   RECERT DATE: 2020    THERAPY DIAGNOSIS: SI pain       Visit Dx:    ICD-10-CM ICD-9-CM   1. Chronic SI joint pain M53.3 724.6    G89.29 338.29       Patient Active Problem List   Diagnosis   • Hyperlipidemia   • GERD (gastroesophageal reflux disease)   • Essential hypertension   • Degenerative joint disease involving multiple joints   • Constipation   • Nuclear cataract   • Episcleritis   • Corneal ulcer, marginal   • Palpitation   • Abnormal EKG   • Dizziness   • Precordial pain   • Osteoporosis   • Irritable bowel syndrome        Past Medical History:   Diagnosis Date   • Acute otitis media 09/10/2015   • Acute pharyngitis 09/10/2015   • Acute sinusitis 09/10/2015   • Biliary colic 10/17/2014    STONES BY REVIEW US   • Breast cyst    • Change in bowel habits 2016   • Chest rales     MEDIUM   • Cholelithiasis without obstruction 10/17/2014   • Chronic cholecystitis 10/17/2014   • Constipation 2015   • Degenerative joint disease involving multiple joints 09/10/2014   • Elevated blood-pressure reading without diagnosis of hypertension 2012    BLOOD PRESSURE NORMAL AT HOME   • Epigastric pain 09/10/2014   • Essential hypertension 2015   • Fibrocystic breast    • Gastritis 2015   • GERD (gastroesophageal reflux disease) 2015   • Hammer toe 2015   • History of colonoscopy 2014    Information taken from [facility]    • Hyperlipidemia 2015   • Ingrowing nail [id number]   • Ingrown toenail    • Irritable bowel disease    • Pain in right foot 2015   • Peripheral neuropathy 09/10/2014   • Plantar fasciitis         Past Surgical History:   Procedure Laterality Date   • BREAST BIOPSY   11/22/2004    R mammotome biop w/ image guided placement of metallic localization clip & stereotactic location & guidance for breast biop. radiologic examination of the surgical specimen & unilateral mammography   • BREAST CYST ASPIRATION     • CHOLECYSTECTOMY  10/09/2014   • COLONOSCOPY  08/28/2014    Hemorrhoids found.   • COLONOSCOPY N/A 12/6/2018    Procedure: COLONOSCOPY;  Surgeon: Everardo Beltran DO;  Location: [facility] ENDOSCOPY;  Service: Gastroenterology   • DILATATION AND CURETTAGE  08/28/2014    Postmenopausal bleeding   • ENDOSCOPY  08/28/2014    Normal hypopharynx, esophagus. A hiatus hernia found in the GE junction.Non-erosive gastritis found in the stomach. Multiple biopsies taken.Moderate amount of bile sained fluid in the stomach.Normal, symmetrical, and patent pylorus.Normal duodenum.   • INJECTION OF MEDICATION  07/30/2015    KENALOG(1)   • TONSILLECTOMY                         PT Assessment/Plan     Row Name 07/20/20 1500          PT Assessment    Assessment Comments  The patient continues to have decreased flexibility in hamstrings, piriformis, and QL. She continues to have tenderness with pressure over the SI. She reports that symptoms have centralized. She has decreased trunk control at this time. Skilled services will continue to be beneficial to decrease pain, improve strength and function.   -AC     Rehab Potential  Good  -AC     Patient/caregiver participated in establishment of treatment plan and goals  Yes  -AC     Patient would benefit from skilled therapy intervention  Yes  -AC        PT Plan    PT Frequency  2x/week  -AC     Predicted Duration of Therapy Intervention (Therapy Eval)  4-6 weeks  -AC     PT Plan Comments  Continue with hip and core strengthening and stretching for stunk and hips. focus on core strength.   -AC       User Key  (r) = Recorded By, (t) = Taken By, (c) = Cosigned By    Initials Name Provider Type    AC Marialuisa Pringle, PT Physical Therapist            OP  Exercises     Row Name 07/20/20 1400             Subjective Comments    Subjective Comments  The patient states that she is not in too much pain today. She reports that She felt good after last session however had increased pain in bed and she relates it mainly to advanced bridging exercise.   -AC         Subjective Pain    Able to rate subjective pain?  yes  -AC      Pre-Treatment Pain Level  0  -AC      Post-Treatment Pain Level  2  -AC         Exercise 1    Exercise Name 1  Pro II- level 4  -AC      Time 1  10 minutes  -AC         Exercise 2    Exercise Name 2  seated piriformis stretch   -AC      Sets 2  3  -AC      Time 2  30 s  -AC         Exercise 3    Exercise Name 3  standing hamstring stretch   -AC      Sets 3  3  -AC      Time 3  30s  -AC         Exercise 4    Exercise Name 4  standing marching   -AC      Sets 4  2  -AC      Reps 4  20  -AC         Exercise 5    Exercise Name 5  standing hip 3-way   -AC      Sets 5  1  -AC      Reps 5  20  -AC         Exercise 6    Exercise Name 6  side stepping   -AC      Sets 6  2  -AC      Time 6  20 feet  -AC      Additional Comments  yellow theraband   -AC         Exercise 7    Exercise Name 7  zombie walks   -AC      Sets 7  2  -AC      Time 7  20 feet   -AC      Additional Comments  yellow theraband   -AC         Exercise 8    Exercise Name 8  backward walking   -AC      Sets 8  2  -AC      Time 8  20 feet  -AC      Additional Comments  yellow therband   -AC         Exercise 9    Exercise Name 9  eccentric lowering- 6 inch step   -AC      Sets 9  2  -AC      Reps 9  10   -AC      Additional Comments  BLE   -AC         Exercise 10    Exercise Name 10  bridging  -AC      Sets 10  1  -AC      Reps 10  20   -AC      Additional Comments  --  -AC         Exercise 11    Exercise Name 11  hooklying- core   -AC      Sets 11  2  -AC      Reps 11  10  -AC         Exercise 12    Exercise Name 12  hip adduction- ball squeeze   -AC      Time 12  20  -AC         Exercise 13     Exercise Name 13  seated on ball- marching   -AC      Sets 13  2  -AC      Reps 13  20   -AC        User Key  (r) = Recorded By, (t) = Taken By, (c) = Cosigned By    Initials Name Provider Type    Marialuisa Roca, PT Physical Therapist                       PT OP Goals     Row Name 07/20/20 1500          PT Short Term Goals    STG Date to Achieve  07/21/20  -AC     STG 1  Note a >/= 50% subjective improvement.   -AC     STG 1 Progress  Met  -AC     STG 2  Increase trunk flexion to >/= 95 deg.  -AC     STG 2 Progress  Not Met  -AC     STG 3  Increase trunk L lateral flexion to >/= 20 deg.  -AC     STG 3 Progress  Not Met  -AC        Long Term Goals    LTG Date to Achieve  08/11/20  -AC     LTG 1  Independent with HEP/self-management.   -AC     LTG 1 Progress  Not Met  -AC     LTG 2  TROM WFLs with minimal to no pain.  -AC     LTG 2 Progress  Not Met  -AC     LTG 3  Resume PLOF with minimal symptoms.  -AC     LTG 3 Progress  Not Met  -AC        Time Calculation    PT Goal Re-Cert Due Date  07/21/20  -       User Key  (r) = Recorded By, (t) = Taken By, (c) = Cosigned By    Initials Name Provider Type    Marialuisa Roca, PT Physical Therapist                         Time Calculation:   Start Time: 1430  Stop Time: 1510  Time Calculation (min): 40 min  Therapy Charges for Today     Code Description Service Date Service Provider Modifiers Qty    [id number] HC PT THER PROC EA 15 MIN 7/20/2020 Marialuisa Pringle, PT GP 3                    Marialuisa Pringle, PT  7/20/2020

## 2020-07-22 ENCOUNTER — APPOINTMENT (OUTPATIENT)
Dept: PHYSICAL THERAPY | Facility: HOSPITAL | Age: 75
End: 2020-07-22

## 2020-07-24 ENCOUNTER — E-VISIT (OUTPATIENT)
Dept: FAMILY MEDICINE CLINIC | Facility: TELEHEALTH | Age: 75
End: 2020-07-24

## 2020-07-24 DIAGNOSIS — Z71.89 EDUCATED ABOUT COVID-19 VIRUS INFECTION: Primary | ICD-10-CM

## 2020-07-24 DIAGNOSIS — H92.09 OTALGIA, UNSPECIFIED LATERALITY: ICD-10-CM

## 2020-07-24 DIAGNOSIS — R51.9 FACIAL PAIN: ICD-10-CM

## 2020-07-24 PROCEDURE — 99422 OL DIG E/M SVC 11-20 MIN: CPT | Performed by: NURSE PRACTITIONER

## 2020-07-24 PROCEDURE — U0002 COVID-19 LAB TEST NON-CDC: HCPCS | Performed by: NURSE PRACTITIONER

## 2020-07-24 RX ORDER — AMOXICILLIN AND CLAVULANATE POTASSIUM 875; 125 MG/1; MG/1
1 TABLET, FILM COATED ORAL 2 TIMES DAILY
Qty: 20 TABLET | Refills: 0 | Status: SHIPPED | OUTPATIENT
Start: 2020-07-24 | End: 2020-07-27

## 2020-07-24 NOTE — PROGRESS NOTES
I reviewed the patient's questionnaire and advised her to go to [facility] [place] urgent care for evaluation of COVID-19 symptoms. I did go ahead and send a prescription of augmentin for sinusitis/otitis symptoms to [place] pharmacy. Detailed instructions provided in AVS including medication side effects and when to follow up.    I spent 11-20 minutes in the patient's chart for this e-visit.

## 2020-07-24 NOTE — PATIENT INSTRUCTIONS
-Go to urgent care for evaluation and COVID-19 testing  -Drink plenty of fluids and rest, if symptoms get worse go to ER.  -Quarantine at home until you hear back about your test results or 14 days from the start of your symptoms AND 72 hours without fever.      How to Quarantine at Home  Information for Patients and Families    These instructions are for people with confirmed or suspected COVID-19 who do not need to be hospitalized and those with confirmed COVID-19 who were hospitalized and discharged to care for themselves at home.    If you were tested through the Health Department  The Health Department will monitor your wellbeing.  If it is determined that you do not need to be hospitalized and can be isolated at home, you will be monitored by staff from your local or state health department.     If you were tested through a [facility]  You will need to monitor yourself and report changes in your symptoms to your doctor.  See the section below called Monitor Your Symptoms.    Follow these steps until a healthcare provider or local or state health department says you can return to your normal activities.    Stay home except to get medical care  • Restrict activities outside your home, except for getting medical care.   • Do not go to work, school, or public areas.   • Avoid using public transportation, ride-sharing, or taxis.    Separate yourself from other people and animals in your home  People  As much as possible, you should stay in a specific room and away from other people in your home. Also, you should use a separate bathroom, if available.    Animals  You should restrict contact with pets and other animals while you are sick with COVID-19, just like you would around other people. When possible, have another member of your household care for your animals while you are sick. If you are sick with COVID-19, avoid contact with your pet, including petting, snuggling, being kissed or licked, and sharing  food. If you must care for your pet or be around animals while you are sick, wash your hands before and after you interact with pets and wear a facemask. See COVID-19 and Animals for more information.    Call ahead before visiting your doctor  If you have a medical appointment, call the healthcare provider and tell them that you have or may have COVID-19. This information will help the healthcare provider’s office take steps to keep other people from getting infected or exposed.    Wear a facemask  You should wear a facemask when you are around other people (e.g., sharing a room or vehicle) or pets and before you enter a healthcare provider’s office.     If you are not able to wear a facemask (for example, because it causes trouble breathing), then people who live with you should not stay in the same room with you, or they should wear a facemask if they enter your room.    Cover your coughs and sneezes  • Cover your mouth and nose with a tissue when you cough or sneeze.   • Throw used tissues in a lined trash can.   • Immediately wash your hands with soap and water for at least 20 seconds or, if soap and water are not available, clean your hands with an alcohol-based hand  that contains at least 60% alcohol.    Clean your hands often  • Wash your hands often with soap and water for at least 20 seconds, especially after blowing your nose, coughing, or sneezing; going to the bathroom; and before eating or preparing food.     • If soap and water are not readily available, use an alcohol-based hand  with at least 60% alcohol, covering all surfaces of your hands and rubbing them together until they feel dry.    • Soap and water are the best option if hands are visibly dirty. Avoid touching your eyes, nose, and mouth with unwashed hands.    Avoid sharing personal household items  • You should not share dishes, drinking glasses, cups, eating utensils, towels, or bedding with other people or pets in your  home.   • After using these items, they should be washed thoroughly with soap and water.    Clean all “high-touch” surfaces everyday  • High touch surfaces include counters, tabletops, doorknobs, bathroom fixtures, toilets, phones, keyboards, tablets, and bedside tables.   • Also, clean any surfaces that may have blood, stool, or body fluids on them.   • Use a household cleaning spray or wipe, according to the label instructions. Labels contain instructions for safe and effective use of the cleaning product, including precautions you should take when applying the product, such as wearing gloves and making sure you have good ventilation during use of the product.    Monitor your symptoms  • Seek prompt medical attention if your illness is worsening (e.g., difficulty breathing).   • Before seeking care, call your healthcare provider and tell them that you have, or are being evaluated for, COVID-19.   • Put on a facemask before you enter the facility.     • These steps will help the healthcare provider’s office to keep other people in the office or waiting room from getting infected or exposed.   • Persons who are placed under active monitoring or facilitated self-monitoring should follow instructions provided by their local health department or occupational health professionals, as appropriate.  • If you have a medical emergency and need to call 911, notify the dispatch personnel that you have, or are being evaluated for COVID-19. If possible, put on a facemask before emergency medical services arrive.    Discontinuing home isolation  Patients with confirmed COVID-19 should remain under home isolation precautions until the risk of secondary transmission to others is thought to be low. The decision to discontinue home isolation precautions should be made on a case-by-case basis, in consultation with healthcare providers and state and local health departments.    The below content are for household members, intimate  partners, and caregivers of a patient with symptomatic laboratory-confirmed COVID-19 or a patient under investigation:    Household members, intimate partners, and caregivers may have close contact with a person with symptomatic, laboratory-confirmed COVID-19 or a person under investigation.     Close contacts should monitor their health; they should call their healthcare provider right away if they develop symptoms suggestive of COVID-19 (e.g., fever, cough, shortness of breath)     Close contacts should also follow these recommendations:  • Make sure that you understand and can help the patient follow their healthcare provider’s instructions for medication(s) and care. You should help the patient with basic needs in the home and provide support for getting groceries, prescriptions, and other personal needs.  • Monitor the patient’s symptoms. If the patient is getting sicker, call his or her healthcare provider and tell them that the patient has laboratory-confirmed COVID-19. This will help the healthcare provider’s office take steps to keep other people in the office or waiting room from getting infected. Ask the healthcare provider to call the local or [facility] health department for additional guidance. If the patient has a medical emergency and you need to call 911, notify the dispatch personnel that the patient has, or is being evaluated for COVID-19.  • Household members should stay in another room or be  from the patient as much as possible. Household members should use a separate bedroom and bathroom, if available.  • Prohibit visitors who do not have an essential need to be in the home.  • Household members should care for any pets in the home. Do not handle pets or other animals while sick.  For more information, see COVID-19 and Animals.  • Make sure that shared spaces in the home have good air flow, such as by an air conditioner or an opened window, weather permitting.  • Perform hand hygiene  frequently. Wash your hands often with soap and water for at least 20 seconds or use an alcohol-based hand  that contains 60 to 95% alcohol, covering all surfaces of your hands and rubbing them together until they feel dry. Soap and water should be used preferentially if hands are visibly dirty.  • Avoid touching your eyes, nose, and mouth with unwashed hands.  • The patient should wear a facemask when you are around other people. If the patient is not able to wear a facemask (for example, because it causes trouble breathing), you, as the caregiver, should wear a mask when you are in the same room as the patient.  • Wear a disposable facemask and gloves when you touch or have contact with the patient’s blood, stool, or body fluids, such as saliva, sputum, nasal mucus, vomit, or urine.   o Throw out disposable facemasks and gloves after using them. Do not reuse.  o When removing personal protective equipment, first remove and dispose of gloves. Then, immediately clean your hands with soap and water or alcohol-based hand . Next, remove and dispose of facemask, and immediately clean your hands again with soap and water or alcohol-based hand .  • Avoid sharing household items with the patient. You should not share dishes, drinking glasses, cups, eating utensils, towels, bedding, or other items. After the patient uses these items, you should wash them thoroughly (see below “Wash laundry thoroughly”).  • Clean all “high-touch” surfaces, such as counters, tabletops, doorknobs, bathroom fixtures, toilets, phones, keyboards, tablets, and bedside tables, every day. Also, clean any surfaces that may have blood, stool, or body fluids on them.   o Use a household cleaning spray or wipe, according to the label instructions. Labels contain instructions for safe and effective use of the cleaning product including precautions you should take when applying the product, such as wearing gloves and making sure  you have good ventilation during use of the product.  • Wash laundry thoroughly.   o Immediately remove and wash clothes or bedding that have blood, stool, or body fluids on them.  o Wear disposable gloves while handling soiled items and keep soiled items away from your body. Clean your hands (with soap and water or an alcohol-based hand ) immediately after removing your gloves.  o Read and follow directions on labels of laundry or clothing items and detergent. In general, using a normal laundry detergent according to washing machine instructions and dry thoroughly using the warmest temperatures recommended on the clothing label.  • Place all used disposable gloves, facemasks, and other contaminated items in a lined container before disposing of them with other household waste. Clean your hands (with soap and water or an alcohol-based hand ) immediately after handling these items. Soap and water should be used preferentially if hands are visibly dirty.  • Discuss any additional questions with your state or local health department or healthcare provider.    Adapted from information provided by the Centers for Disease Control and Prevention.  For more information, visit https://www.cdc.gov/coronavirus/2019-ncov/hcp/guidance-prevent-spread.htmlOtitis Media, Adult    Otitis media occurs when there is inflammation and fluid in the middle ear. Your middle ear is a part of the ear that contains bones for hearing as well as air that helps send sounds to your brain.  What are the causes?  This condition is caused by a blockage in the eustachian tube. This tube drains fluid from the ear to the back of the nose (nasopharynx). A blockage in this tube can be caused by an object or by swelling (edema) in the tube. Problems that can cause a blockage include:  · A cold or other upper respiratory infection.  · Allergies.  · An irritant, such as tobacco smoke.  · Enlarged adenoids. The adenoids are areas of soft  tissue located high in the back of the throat, behind the nose and the roof of the mouth.  · A mass in the nasopharynx.  · Damage to the ear caused by pressure changes (barotrauma).  What are the signs or symptoms?  Symptoms of this condition include:  · Ear pain.  · A fever.  · Decreased hearing.  · A headache.  · Tiredness (lethargy).  · Fluid leaking from the ear.  · Ringing in the ear.  How is this diagnosed?  This condition is diagnosed with a physical exam. During the exam your health care provider will use an instrument called an otoscope to look into your ear and check for redness, swelling, and fluid. He or she will also ask about your symptoms.  Your health care provider may also order tests, such as:  · A test to check the movement of the eardrum (pneumatic otoscopy). This test is done by squeezing a small amount of air into the ear.  · A test that changes air pressure in the middle ear to check how well the eardrum moves and whether the eustachian tube is working (tympanogram).  How is this treated?  This condition usually goes away on its own within 3-5 days. But if the condition is caused by a bacteria infection and does not go away own its own, or keeps coming back, your health care provider may:  · Prescribe antibiotic medicines to treat the infection.  · Prescribe or recommend medicines to control pain.  Follow these instructions at home:  · Take over-the-counter and prescription medicines only as told by your health care provider.  · If you were prescribed an antibiotic medicine, take it as told by your health care provider. Do not stop taking the antibiotic even if you start to feel better.  · Keep all follow-up visits as told by your health care provider. This is important.  Contact a health care provider if:  · You have bleeding from your nose.  · There is a lump on your neck.  · You are not getting better in 5 days.  · You feel worse instead of better.  Get help right away if:  · You have  severe pain that is not controlled with medicine.  · You have swelling, redness, or pain around your ear.  · You have stiffness in your neck.  · A part of your face is paralyzed.  · The bone behind your ear (mastoid) is tender when you touch it.  · You develop a severe headache.  Summary  · Otitis media is redness, soreness, and swelling of the middle ear.  · This condition usually goes away on its own within 3-5 days.  · If the problem does not go away in 3-5 days, your health care provider may prescribe or recommend medicines to treat your symptoms.  · If you were prescribed an antibiotic medicine, take it as told by your health care provider.  This information is not intended to replace advice given to you by your health care provider. Make sure you discuss any questions you have with your health care provider.  Document Released: 09/22/2005 Document Revised: 11/30/2018 Document Reviewed: 12/08/2017  [employer] Patient Education © 2020 [employer]  Sinusitis, Adult  Sinusitis is soreness and swelling (inflammation) of your sinuses. Sinuses are hollow spaces in the bones around your face. They are located:  · Around your eyes.  · In the middle of your forehead.  · Behind your nose.  · In your cheekbones.  Your sinuses and nasal passages are lined with a fluid called mucus. Mucus drains out of your sinuses. Swelling can trap mucus in your sinuses. This lets germs (bacteria, virus, or fungus) grow, which leads to infection. Most of the time, this condition is caused by a virus.  What are the causes?  This condition is caused by:  · Allergies.  · Asthma.  · Germs.  · Things that block your nose or sinuses.  · Growths in the nose (nasal polyps).  · Chemicals or irritants in the air.  · Fungus (rare).  What increases the risk?  You are more likely to develop this condition if:  · You have a weak body defense system (immune system).  · You do a lot of swimming or diving.  · You use nasal sprays too much.  · You  smoke.  What are the signs or symptoms?  The main symptoms of this condition are pain and a feeling of pressure around the sinuses. Other symptoms include:  · Stuffy nose (congestion).  · Runny nose (drainage).  · Swelling and warmth in the sinuses.  · Headache.  · Toothache.  · A cough that may get worse at night.  · Mucus that collects in the throat or the back of the nose (postnasal drip).  · Being unable to smell and taste.  · Being very tired (fatigue).  · A fever.  · Sore throat.  · Bad breath.  How is this diagnosed?  This condition is diagnosed based on:  · Your symptoms.  · Your medical history.  · A physical exam.  · Tests to find out if your condition is short-term (acute) or long-term (chronic). Your doctor may:  ? Check your nose for growths (polyps).  ? Check your sinuses using a tool that has a light (endoscope).  ? Check for allergies or germs.  ? Do imaging tests, such as an MRI or CT scan.  How is this treated?  Treatment for this condition depends on the cause and whether it is short-term or long-term.  · If caused by a virus, your symptoms should go away on their own within 10 days. You may be given medicines to relieve symptoms. They include:  ? Medicines that shrink swollen tissue in the nose.  ? Medicines that treat allergies (antihistamines).  ? A spray that treats swelling of the nostrils.   ? Rinses that help get rid of thick mucus in your nose (nasal saline washes).  · If caused by bacteria, your doctor may wait to see if you will get better without treatment. You may be given antibiotic medicine if you have:  ? A very bad infection.  ? A weak body defense system.  · If caused by growths in the nose, you may need to have surgery.  Follow these instructions at home:  Medicines  · Take, use, or apply over-the-counter and prescription medicines only as told by your doctor. These may include nasal sprays.  · If you were prescribed an antibiotic medicine, take it as told by your doctor. Do not  stop taking the antibiotic even if you start to feel better.  Hydrate and humidify    · Drink enough water to keep your pee (urine) pale yellow.  · Use a cool mist humidifier to keep the humidity level in your home above 50%.  · Breathe in steam for 10-15 minutes, 3-4 times a day, or as told by your doctor. You can do this in the bathroom while a hot shower is running.  · Try not to spend time in cool or dry air.  Rest  · Rest as much as you can.  · Sleep with your head raised (elevated).  · Make sure you get enough sleep each night.  General instructions    · Put a warm, moist washcloth on your face 3-4 times a day, or as often as told by your doctor. This will help with discomfort.  · Wash your hands often with soap and water. If there is no soap and water, use hand .  · Do not smoke. Avoid being around people who are smoking (secondhand smoke).  · Keep all follow-up visits as told by your doctor. This is important.  Contact a doctor if:  · You have a fever.  · Your symptoms get worse.  · Your symptoms do not get better within 10 days.  Get help right away if:  · You have a very bad headache.  · You cannot stop throwing up (vomiting).  · You have very bad pain or swelling around your face or eyes.  · You have trouble seeing.  · You feel confused.  · Your neck is stiff.  · You have trouble breathing.  Summary  · Sinusitis is swelling of your sinuses. Sinuses are hollow spaces in the bones around your face.  · This condition is caused by tissues in your nose that become inflamed or swollen. This traps germs. These can lead to infection.  · If you were prescribed an antibiotic medicine, take it as told by your doctor. Do not stop taking it even if you start to feel better.  · Keep all follow-up visits as told by your doctor. This is important.  This information is not intended to replace advice given to you by your health care provider. Make sure you discuss any questions you have with your health care  provider.  Document Released: 06/05/2009 Document Revised: 05/20/2019 Document Reviewed: 05/20/2019  [employer] Patient Education © 2020 [employer]  Amoxicillin; Clavulanic Acid tablets  What is this medicine?  AMOXICILLIN; CLAVULANIC ACID (a mox i SE in; MARIA E garber ic AS id) is a penicillin antibiotic. It is used to treat certain kinds of bacterial infections. It will not work for colds, flu, or other viral infections.  This medicine may be used for other purposes; ask your health care provider or pharmacist if you have questions.  COMMON BRAND NAME(S): Augmentin  What should I tell my health care provider before I take this medicine?  They need to know if you have any of these conditions:  · bowel disease, like colitis  · kidney disease  · liver disease  · mononucleosis  · an unusual or allergic reaction to amoxicillin, penicillin, cephalosporin, other antibiotics, clavulanic acid, other medicines, foods, dyes, or preservatives  · pregnant or trying to get pregnant  · breast-feeding  How should I use this medicine?  Take this medicine by mouth with a full glass of water. Follow the directions on the prescription label. Take at the start of a meal. Do not crush or chew. If the tablet has a score line, you may cut it in half at the score line for easier swallowing. Take your medicine at regular intervals. Do not take your medicine more often than directed. Take all of your medicine as directed even if you think you are better. Do not skip doses or stop your medicine early.  Talk to your pediatrician regarding the use of this medicine in children. Special care may be needed.  Overdosage: If you think you have taken too much of this medicine contact a poison control center or emergency room at once.  NOTE: This medicine is only for you. Do not share this medicine with others.  What if I miss a dose?  If you miss a dose, take it as soon as you can. If it is almost time for your next dose, take only that dose. Do  not take double or extra doses.  What may interact with this medicine?  · allopurinol  · anticoagulants  · birth control pills  · methotrexate  · probenecid  This list may not describe all possible interactions. Give your health care provider a list of all the medicines, herbs, non-prescription drugs, or dietary supplements you use. Also tell them if you smoke, drink alcohol, or use illegal drugs. Some items may interact with your medicine.  What should I watch for while using this medicine?  Tell your doctor or healthcare provider if your symptoms do not improve.  This medicine may cause serious skin reactions. They can happen weeks to months after starting the medicine. Contact your healthcare provider right away if you notice fevers or flu-like symptoms with a rash. The rash may be red or purple and then turn into blisters or peeling of the skin. Or, you might notice a red rash with swelling of the face, lips or lymph nodes in your neck or under your arms.  Do not treat diarrhea with over the counter products. Contact your doctor if you have diarrhea that lasts more than 2 days or if it is severe and watery.  If you have diabetes, you may get a false-positive result for sugar in your urine. Check with your doctor or healthcare provider.  Birth control pills may not work properly while you are taking this medicine. Talk to your doctor about using an extra method of birth control.  What side effects may I notice from receiving this medicine?  Side effects that you should report to your doctor or health care professional as soon as possible:  · allergic reactions like skin rash, itching or hives, swelling of the face, lips, or tongue  · breathing problems  · dark urine  · fever or chills, sore throat  · redness, blistering, peeling, or loosening of the skin, including inside the mouth  · seizures  · trouble passing urine or change in the amount of urine  · unusual bleeding, bruising  · unusually weak or  tired  · white patches or sores in the mouth or throat  Side effects that usually do not require medical attention (report to your doctor or health care professional if they continue or are bothersome):  · diarrhea  · dizziness  · headache  · nausea, vomiting  · stomach upset  · vaginal or anal irritation  This list may not describe all possible side effects. Call your doctor for medical advice about side effects. You may report side effects to FDA at 6-570-BDK-8046.  Where should I keep my medicine?  Keep out of the reach of children.  Store at room temperature below 25 degrees C (77 degrees F). Keep container tightly closed. Throw away any unused medicine after the expiration date.  NOTE: This sheet is a summary. It may not cover all possible information. If you have questions about this medicine, talk to your doctor, pharmacist, or health care provider.  © 2020 [employer]vier/Gold Standard (2020-03-02 09:43:46)

## 2020-07-27 ENCOUNTER — TELEPHONE (OUTPATIENT)
Dept: FAMILY MEDICINE CLINIC | Facility: CLINIC | Age: 75
End: 2020-07-27

## 2020-07-27 ENCOUNTER — APPOINTMENT (OUTPATIENT)
Dept: PHYSICAL THERAPY | Facility: HOSPITAL | Age: 75
End: 2020-07-27

## 2020-07-27 RX ORDER — CEFPROZIL 500 MG/1
500 TABLET, FILM COATED ORAL 2 TIMES DAILY
Qty: 14 TABLET | Refills: 0 | Status: SHIPPED | OUTPATIENT
Start: 2020-07-27 | End: 2020-11-02

## 2020-07-27 NOTE — TELEPHONE ENCOUNTER
Robert called this am and said, she went to Urgent Care this past weekend and they told her she has a really bad sinus infection.  They gave her Augmentin and said, she can't take that.  She was tested for Covid, but hasn't received her results yet.  Asking, if you will call her a different antibiotic in please?

## 2020-07-29 ENCOUNTER — APPOINTMENT (OUTPATIENT)
Dept: PHYSICAL THERAPY | Facility: HOSPITAL | Age: 75
End: 2020-07-29

## 2020-10-08 ENCOUNTER — TRANSCRIBE ORDERS (OUTPATIENT)
Dept: LAB | Facility: HOSPITAL | Age: 75
End: 2020-10-08

## 2020-10-08 ENCOUNTER — LAB (OUTPATIENT)
Dept: LAB | Facility: HOSPITAL | Age: 75
End: 2020-10-08

## 2020-10-08 DIAGNOSIS — L30.4 INTERTRIGO: Primary | ICD-10-CM

## 2020-10-08 LAB — KOH PREP NAIL: NORMAL

## 2020-10-08 PROCEDURE — 87102 FUNGUS ISOLATION CULTURE: CPT | Performed by: NURSE PRACTITIONER

## 2020-10-08 PROCEDURE — 87220 TISSUE EXAM FOR FUNGI: CPT | Performed by: NURSE PRACTITIONER

## 2020-10-08 PROCEDURE — 87205 SMEAR GRAM STAIN: CPT | Performed by: NURSE PRACTITIONER

## 2020-10-08 PROCEDURE — 87070 CULTURE OTHR SPECIMN AEROBIC: CPT | Performed by: NURSE PRACTITIONER

## 2020-10-11 LAB
BACTERIA SPEC AEROBE CULT: NORMAL
GRAM STN SPEC: NORMAL
GRAM STN SPEC: NORMAL

## 2020-10-28 ENCOUNTER — LAB (OUTPATIENT)
Dept: LAB | Facility: HOSPITAL | Age: 75
End: 2020-10-28

## 2020-10-28 DIAGNOSIS — E78.2 MIXED HYPERLIPIDEMIA: ICD-10-CM

## 2020-10-28 DIAGNOSIS — M81.0 AGE-RELATED OSTEOPOROSIS WITHOUT CURRENT PATHOLOGICAL FRACTURE: ICD-10-CM

## 2020-10-28 LAB
ALBUMIN SERPL-MCNC: 4.4 G/DL (ref 3.5–5.2)
ALBUMIN/GLOB SERPL: 1.6 G/DL
ALP SERPL-CCNC: 41 U/L (ref 39–117)
ALT SERPL W P-5'-P-CCNC: 28 U/L (ref 1–33)
ANION GAP SERPL CALCULATED.3IONS-SCNC: 11.3 MMOL/L (ref 5–15)
AST SERPL-CCNC: 26 U/L (ref 1–32)
BACTERIA UR QL AUTO: ABNORMAL /HPF
BILIRUB SERPL-MCNC: 0.5 MG/DL (ref 0–1.2)
BILIRUB UR QL STRIP: NEGATIVE
BUN SERPL-MCNC: 12 MG/DL (ref 8–23)
BUN/CREAT SERPL: 16.7 (ref 7–25)
CALCIUM SPEC-SCNC: 9.8 MG/DL (ref 8.6–10.5)
CHLORIDE SERPL-SCNC: 108 MMOL/L (ref 98–107)
CHOLEST SERPL-MCNC: 203 MG/DL (ref 0–200)
CLARITY UR: CLEAR
CO2 SERPL-SCNC: 24.7 MMOL/L (ref 22–29)
COLOR UR: ABNORMAL
CREAT SERPL-MCNC: 0.72 MG/DL (ref 0.57–1)
GFR SERPL CREATININE-BSD FRML MDRD: 79 ML/MIN/1.73
GLOBULIN UR ELPH-MCNC: 2.8 GM/DL
GLUCOSE SERPL-MCNC: 98 MG/DL (ref 65–99)
GLUCOSE UR STRIP-MCNC: NEGATIVE MG/DL
HDLC SERPL-MCNC: 67 MG/DL (ref 40–60)
HGB UR QL STRIP.AUTO: NEGATIVE
HYALINE CASTS UR QL AUTO: ABNORMAL /LPF
KETONES UR QL STRIP: NEGATIVE
LDLC SERPL CALC-MCNC: 113 MG/DL (ref 0–100)
LDLC/HDLC SERPL: 1.64 {RATIO}
LEUKOCYTE ESTERASE UR QL STRIP.AUTO: NEGATIVE
MAGNESIUM SERPL-MCNC: 2.4 MG/DL (ref 1.6–2.4)
NITRITE UR QL STRIP: NEGATIVE
PH UR STRIP.AUTO: 7.5 [PH] (ref 5–8)
PHOSPHATE SERPL-MCNC: 3.7 MG/DL (ref 2.5–4.5)
POTASSIUM SERPL-SCNC: 4.1 MMOL/L (ref 3.5–5.2)
PROT SERPL-MCNC: 7.2 G/DL (ref 6–8.5)
PROT UR QL STRIP: NEGATIVE
RBC # UR: ABNORMAL /HPF
REF LAB TEST METHOD: ABNORMAL
SODIUM SERPL-SCNC: 144 MMOL/L (ref 136–145)
SP GR UR STRIP: 1.02 (ref 1–1.03)
SQUAMOUS #/AREA URNS HPF: ABNORMAL /HPF
TRIGL SERPL-MCNC: 129 MG/DL (ref 0–150)
UROBILINOGEN UR QL STRIP: ABNORMAL
VLDLC SERPL-MCNC: 23 MG/DL (ref 5–40)
WBC UR QL AUTO: ABNORMAL /HPF

## 2020-10-28 PROCEDURE — 83735 ASSAY OF MAGNESIUM: CPT

## 2020-10-28 PROCEDURE — 80061 LIPID PANEL: CPT

## 2020-10-28 PROCEDURE — 84100 ASSAY OF PHOSPHORUS: CPT

## 2020-10-28 PROCEDURE — 80053 COMPREHEN METABOLIC PANEL: CPT

## 2020-10-28 PROCEDURE — 81001 URINALYSIS AUTO W/SCOPE: CPT

## 2020-10-28 PROCEDURE — 36415 COLL VENOUS BLD VENIPUNCTURE: CPT

## 2020-11-02 ENCOUNTER — OFFICE VISIT (OUTPATIENT)
Dept: FAMILY MEDICINE CLINIC | Facility: CLINIC | Age: 75
End: 2020-11-02

## 2020-11-02 VITALS
OXYGEN SATURATION: 98 % | HEART RATE: 89 BPM | BODY MASS INDEX: 27.68 KG/M2 | HEIGHT: 63 IN | SYSTOLIC BLOOD PRESSURE: 144 MMHG | DIASTOLIC BLOOD PRESSURE: 70 MMHG | WEIGHT: 156.2 LBS

## 2020-11-02 DIAGNOSIS — M81.0 AGE-RELATED OSTEOPOROSIS WITHOUT CURRENT PATHOLOGICAL FRACTURE: ICD-10-CM

## 2020-11-02 DIAGNOSIS — I10 ESSENTIAL HYPERTENSION: Chronic | ICD-10-CM

## 2020-11-02 DIAGNOSIS — Z00.00 MEDICARE ANNUAL WELLNESS VISIT, SUBSEQUENT: Primary | ICD-10-CM

## 2020-11-02 DIAGNOSIS — E78.2 MIXED HYPERLIPIDEMIA: Chronic | ICD-10-CM

## 2020-11-02 DIAGNOSIS — Z12.31 ENCOUNTER FOR SCREENING MAMMOGRAM FOR MALIGNANT NEOPLASM OF BREAST: ICD-10-CM

## 2020-11-02 PROCEDURE — G0439 PPPS, SUBSEQ VISIT: HCPCS | Performed by: GENERAL PRACTICE

## 2020-11-02 PROCEDURE — 99214 OFFICE O/P EST MOD 30 MIN: CPT | Performed by: GENERAL PRACTICE

## 2020-11-02 RX ORDER — MINOCYCLINE HYDROCHLORIDE 100 MG/1
100 CAPSULE ORAL DAILY
COMMUNITY
End: 2021-08-21

## 2020-11-02 RX ORDER — UREA 10 %
LOTION (ML) TOPICAL NIGHTLY PRN
COMMUNITY

## 2020-11-02 RX ORDER — METOPROLOL SUCCINATE 25 MG/1
25 TABLET, EXTENDED RELEASE ORAL DAILY
Qty: 90 TABLET | Refills: 3 | Status: SHIPPED | OUTPATIENT
Start: 2020-11-02 | End: 2021-04-13

## 2020-11-02 NOTE — PROGRESS NOTES
Subjective   Robert Larios is a 75 y.o. female.     Chief Complaint   Patient presents with   • Annual Exam     labs [facility] medicare wellness     For review and evaluation of management of chronic medical problems. Labs reviewed. Due for mammogram.     Hypertension  This is a chronic problem. The current episode started more than 1 year ago. The problem is unchanged (white coat syndrome). The problem is controlled. Associated symptoms include anxiety. Pertinent negatives include no chest pain, headaches, neck pain, palpitations or shortness of breath. There are no associated agents to hypertension. Current antihypertension treatment includes beta blockers. The current treatment provides significant improvement. There are no compliance problems.    Hyperlipidemia  This is a chronic problem. The current episode started more than 1 year ago. The problem is controlled. Recent lipid tests were reviewed and are high (minimally elevated). There are no known factors aggravating her hyperlipidemia. Pertinent negatives include no chest pain, myalgias or shortness of breath. Current antihyperlipidemic treatment includes diet change. The current treatment provides significant improvement of lipids.      The following portions of the patient's history were reviewed and updated as appropriate: allergies, current medications, past family and social history and problem list.    Outpatient Medications Prior to Visit   Medication Sig Dispense Refill   • aspirin 81 MG EC tablet Take 81 mg by mouth 2 (Two) Times a Week.     • Calcium Carbonate-Vit D-Min (CALTRATE PLUS PO) Take 1 tablet by mouth 2 (Two) Times a Day.     • Cholecalciferol (VITAMIN D3) 5000 units capsule capsule Take 4,000 Units by mouth Daily.     • colestipol (COLESTID) 1 g tablet As Needed.     • denosumab (PROLIA) 60 MG/ML solution prefilled syringe syringe Inject  under the skin into the appropriate area as directed. Every 6 mths     • esomeprazole (nexIUM)  20 MG capsule Take 20 mg by mouth 2 (Two) Times a Day.     • loperamide (IMODIUM) 2 MG capsule Take 2 mg by mouth As Needed for diarrhea (2 times per week).     • melatonin 1 MG tablet Take  by mouth At Night As Needed for Sleep.     • minocycline (MINOCIN,DYNACIN) 100 MG capsule Take 100 mg by mouth Daily.     • NON FORMULARY 30mg vitamin B-6  400 mcg vitamin B-12  350 mg DHA  150 mg EPA  200 mg L-Carnitine  50mg CO-Q 10  30 mg trans-resveratrol     • Omega-3 350 MG capsule delayed-release      • metoprolol succinate XL (TOPROL-XL) 25 MG 24 hr tablet Take 1 tablet by mouth Daily. 30 tablet 5   • aluminum hydroxide-mag carbonate (GAVISCON EXTRA RELIEF) 160-105 MG chewable tablet chewable tablet Chew Daily.     • cefprozil (CEFZIL) 500 MG tablet Take 1 tablet by mouth 2 (Two) Times a Day. 14 tablet 0   • folic acid (FOLVITE) 400 MCG tablet Take 400 mcg by mouth Daily.       No facility-administered medications prior to visit.        Review of Systems   Constitutional: Negative.  Negative for chills, fatigue, fever and unexpected weight change.   HENT: Negative.  Negative for congestion, ear pain, hearing loss, nosebleeds, rhinorrhea, sneezing, sore throat and tinnitus.    Eyes: Negative.  Negative for discharge.   Respiratory: Negative.  Negative for cough, shortness of breath and wheezing.    Cardiovascular: Negative.  Negative for chest pain and palpitations.   Gastrointestinal: Negative.  Negative for abdominal pain, constipation, diarrhea, nausea and vomiting.   Endocrine: Negative.    Genitourinary: Negative.  Negative for dysuria, frequency and urgency.   Musculoskeletal: Negative.  Negative for arthralgias, back pain, joint swelling, myalgias and neck pain.   Skin: Negative.  Negative for rash.   Allergic/Immunologic: Negative.    Neurological: Negative.  Negative for dizziness, weakness, numbness and headaches.   Hematological: Negative.  Negative for adenopathy.   Psychiatric/Behavioral: Negative.  Negative  "for dysphoric mood and sleep disturbance. The patient is not nervous/anxious.        Objective     Visit Vitals  /70   Pulse 89   Ht 158.8 cm (62.5\")   Wt 70.9 kg (156 lb 3.2 oz)   SpO2 98%   BMI 28.11 kg/m²     Physical Exam  Vitals signs and nursing note reviewed.   Constitutional:       General: She is not in acute distress.     Appearance: She is well-developed.   HENT:      Head: Normocephalic and atraumatic.      Nose: Nose normal.   Eyes:      General:         Right eye: No discharge.         Left eye: No discharge.      Conjunctiva/sclera: Conjunctivae normal.      Pupils: Pupils are equal, round, and reactive to light.   Neck:      Thyroid: No thyromegaly.      Trachea: No tracheal deviation.   Cardiovascular:      Rate and Rhythm: Normal rate and regular rhythm.      Heart sounds: Normal heart sounds. No murmur.   Pulmonary:      Effort: Pulmonary effort is normal. No respiratory distress.      Breath sounds: Normal breath sounds. No wheezing or rales.   Chest:      Chest wall: No tenderness.      Breasts:         Right: No inverted nipple, mass, nipple discharge, skin change or tenderness.         Left: No inverted nipple, mass, nipple discharge, skin change or tenderness.   Abdominal:      General: Bowel sounds are normal. There is no distension.      Palpations: Abdomen is soft. There is no mass.      Tenderness: There is no abdominal tenderness.      Hernia: No hernia is present.   Musculoskeletal: Normal range of motion.         General: No deformity.   Lymphadenopathy:      Cervical: No cervical adenopathy.   Skin:     General: Skin is warm and dry.   Neurological:      Mental Status: She is alert and oriented to person, place, and time.      Deep Tendon Reflexes: Reflexes are normal and symmetric.   Psychiatric:         Behavior: Behavior normal.         Thought Content: Thought content normal.         Judgment: Judgment normal.       Results for orders placed or performed in visit on 10/28/20 "   Comprehensive Metabolic Panel    Specimen: Blood   Result Value Ref Range    Glucose 98 65 - 99 mg/dL    BUN 12 8 - 23 mg/dL    Creatinine 0.72 0.57 - 1.00 mg/dL    Sodium 144 136 - 145 mmol/L    Potassium 4.1 3.5 - 5.2 mmol/L    Chloride 108 (H) 98 - 107 mmol/L    CO2 24.7 22.0 - 29.0 mmol/L    Calcium 9.8 8.6 - 10.5 mg/dL    Total Protein 7.2 6.0 - 8.5 g/dL    Albumin 4.40 3.50 - 5.20 g/dL    ALT (SGPT) 28 1 - 33 U/L    AST (SGOT) 26 1 - 32 U/L    Alkaline Phosphatase 41 39 - 117 U/L    Total Bilirubin 0.5 0.0 - 1.2 mg/dL    eGFR Non African Amer 79 >60 mL/min/1.73    Globulin 2.8 gm/dL    A/G Ratio 1.6 g/dL    BUN/Creatinine Ratio 16.7 7.0 - 25.0    Anion Gap 11.3 5.0 - 15.0 mmol/L   Lipid Panel    Specimen: Blood   Result Value Ref Range    Total Cholesterol 203 (H) 0 - 200 mg/dL    Triglycerides 129 0 - 150 mg/dL    HDL Cholesterol 67 (H) 40 - 60 mg/dL    LDL Cholesterol  113 (H) 0 - 100 mg/dL    VLDL Cholesterol 23 5 - 40 mg/dL    LDL/HDL Ratio 1.64    Magnesium    Specimen: Blood   Result Value Ref Range    Magnesium 2.4 1.6 - 2.4 mg/dL   Phosphorus    Specimen: Blood   Result Value Ref Range    Phosphorus 3.7 2.5 - 4.5 mg/dL   Urinalysis without microscopic (no culture) - Urine, Clean Catch    Specimen: Urine, Clean Catch   Result Value Ref Range    Color, UA Dark Yellow (A) Yellow, Straw    Appearance, UA Clear Clear    pH, UA 7.5 5.0 - 8.0    Specific Gravity, UA 1.018 1.005 - 1.030    Glucose, UA Negative Negative    Ketones, UA Negative Negative    Bilirubin, UA Negative Negative    Blood, UA Negative Negative    Protein, UA Negative Negative    Leuk Esterase, UA Negative Negative    Nitrite, UA Negative Negative    Urobilinogen, UA 0.2 E.U./dL 0.2 - 1.0 E.U./dL   Urinalysis, Microscopic Only - Urine, Clean Catch    Specimen: Urine, Clean Catch   Result Value Ref Range    RBC, UA 0-2 None Seen, 0-2 /HPF    WBC, UA 0-2 None Seen, 0-2 /HPF    Bacteria, UA None Seen None Seen /HPF    Squamous Epithelial  Cells, UA 3-6 (A) None Seen, 0-2 /HPF    Hyaline Casts, UA 3-6 None Seen /LPF    Methodology Automated Microscopy       Notes brought forward are reviewed and updated if indicated.     Assessment/Plan   Problems Addressed this Visit        Cardiovascular and Mediastinum    Hyperlipidemia (Chronic)    Relevant Orders    Comprehensive Metabolic Panel    Lipid Panel    Vitamin D 25 Hydroxy    Urinalysis With Culture If Indicated -    Essential hypertension (Chronic)    Relevant Medications    metoprolol succinate XL (TOPROL-XL) 25 MG 24 hr tablet    Other Relevant Orders    Comprehensive Metabolic Panel    Lipid Panel    Vitamin D 25 Hydroxy    Urinalysis With Culture If Indicated -       Musculoskeletal and Integument    Osteoporosis    Relevant Orders    Vitamin D 25 Hydroxy      Other Visit Diagnoses     Medicare annual wellness visit, subsequent    -  Primary    Encounter for screening mammogram for malignant neoplasm of breast          Diagnoses       Codes Comments    Medicare annual wellness visit, subsequent    -  Primary ICD-10-CM: Z00.00  ICD-9-CM: V70.0     Mixed hyperlipidemia     ICD-10-CM: E78.2  ICD-9-CM: 272.2     Essential hypertension     ICD-10-CM: I10  ICD-9-CM: 401.9     Encounter for screening mammogram for malignant neoplasm of breast     ICD-10-CM: Z12.31  ICD-9-CM: V76.12     Age-related osteoporosis without current pathological fracture     ICD-10-CM: M81.0  ICD-9-CM: 733.01           Will notify regarding results. Continue current treatment.     New Medications Ordered This Visit   Medications   • metoprolol succinate XL (TOPROL-XL) 25 MG 24 hr tablet     Sig: Take 1 tablet by mouth Daily.     Dispense:  90 tablet     Refill:  3     Return in about 1 year (around 11/2/2021) for Annual physical, medicare wellness visit.

## 2020-11-02 NOTE — PROGRESS NOTES
The ABCs of the Annual Wellness Visit  Subsequent Medicare Wellness Visit    Chief Complaint   Patient presents with   • Annual Exam     labs [facility] medicare wellness       Subjective   History of Present Illness:  Robert Larios is a 75 y.o. female who presents for a Subsequent Medicare Wellness Visit.    HEALTH RISK ASSESSMENT    Recent Hospitalizations:  No hospitalization(s) within the last year.    Current Medical Providers:  Patient Care Team:  Ida Shah MD as PCP - General (Family Medicine)  Everardo Beltran DO as Consulting Physician (Gastroenterology)  Naveed Kim MD as Consulting Physician (Ophthalmology)  Peggy Govea MD as Consulting Physician (Cardiology)    Smoking Status:  Social History     Tobacco Use   Smoking Status Never Smoker   Smokeless Tobacco Never Used       Alcohol Consumption:  Social History     Substance and Sexual Activity   Alcohol Use No       Depression Screen:   PHQ-2/PHQ-9 Depression Screening 11/2/2020   Little interest or pleasure in doing things 0   Feeling down, depressed, or hopeless 0   Trouble falling or staying asleep, or sleeping too much 2   Feeling tired or having little energy 0   Poor appetite or overeating 0   Feeling bad about yourself - or that you are a failure or have let yourself or your family down 0   Trouble concentrating on things, such as reading the newspaper or watching television 0   Moving or speaking so slowly that other people could have noticed. Or the opposite - being so fidgety or restless that you have been moving around a lot more than usual 0   Thoughts that you would be better off dead, or of hurting yourself in some way 0   Total Score 2   If you checked off any problems, how difficult have these problems made it for you to do your work, take care of things at home, or get along with other people? Not difficult at all       Fall Risk Screen:  STEADI Fall Risk Assessment was completed, and patient  is at LOW risk for falls.Assessment completed on:11/2/2020    Health Habits and Functional and Cognitive Screening:  Functional & Cognitive Status 11/2/2020   Do you have difficulty preparing food and eating? No   Do you have difficulty bathing yourself, getting dressed or grooming yourself? No   Do you have difficulty using the toilet? No   Do you have difficulty moving around from place to place? No   Do you have trouble with steps or getting out of a bed or a chair? No   Current Diet Well Balanced Diet   Dental Exam Up to date   Eye Exam Up to date   Exercise (times per week) 7 times per week   Current Exercises Include Other        Exercise Comment climing stairs   Current Exercise Activities Include -   Do you need help using the phone?  No   Are you deaf or do you have serious difficulty hearing?  No   Do you need help with transportation? No   Do you need help shopping? No   Do you need help preparing meals?  No   Do you need help with housework?  No   Do you need help with laundry? No   Do you need help taking your medications? No   Do you need help managing money? No   Do you ever drive or ride in a car without wearing a seat belt? No   Have you felt unusual stress, anger or loneliness in the last month? No   Who do you live with? Spouse   If you need help, do you have trouble finding someone available to you? No   Have you been bothered in the last four weeks by sexual problems? No   Do you have difficulty concentrating, remembering or making decisions? No         Does the patient have evidence of cognitive impairment? No    Asprin use counseling:Taking ASA appropriately as indicated    Age-appropriate Screening Schedule:  Refer to the list below for future screening recommendations based on patient's age, sex and/or medical conditions. Orders for these recommended tests are listed in the plan section. The patient has been provided with a written plan.    Health Maintenance   Topic Date Due   • ZOSTER  VACCINE (2 of 2) 09/28/2017   • TDAP/TD VACCINES (2 - Td) 07/11/2018   • DXA SCAN  10/16/2021   • LIPID PANEL  10/28/2021   • MAMMOGRAM  11/02/2022   • COLONOSCOPY  12/06/2028   • INFLUENZA VACCINE  Completed          The following portions of the patient's history were reviewed and updated as appropriate: allergies, current medications, past family history, past medical history, past social history, past surgical history and problem list.    Outpatient Medications Prior to Visit   Medication Sig Dispense Refill   • aspirin 81 MG EC tablet Take 81 mg by mouth 2 (Two) Times a Week.     • Calcium Carbonate-Vit D-Min (CALTRATE PLUS PO) Take 1 tablet by mouth 2 (Two) Times a Day.     • Cholecalciferol (VITAMIN D3) 5000 units capsule capsule Take 4,000 Units by mouth Daily.     • colestipol (COLESTID) 1 g tablet As Needed.     • denosumab (PROLIA) 60 MG/ML solution prefilled syringe syringe Inject  under the skin into the appropriate area as directed. Every 6 mths     • esomeprazole (nexIUM) 20 MG capsule Take 20 mg by mouth 2 (Two) Times a Day.     • loperamide (IMODIUM) 2 MG capsule Take 2 mg by mouth As Needed for diarrhea (2 times per week).     • melatonin 1 MG tablet Take  by mouth At Night As Needed for Sleep.     • minocycline (MINOCIN,DYNACIN) 100 MG capsule Take 100 mg by mouth Daily.     • NON FORMULARY 30mg vitamin B-6  400 mcg vitamin B-12  350 mg DHA  150 mg EPA  200 mg L-Carnitine  50mg CO-Q 10  30 mg trans-resveratrol     • Omega-3 350 MG capsule delayed-release      • metoprolol succinate XL (TOPROL-XL) 25 MG 24 hr tablet Take 1 tablet by mouth Daily. 30 tablet 5   • aluminum hydroxide-mag carbonate (GAVISCON EXTRA RELIEF) 160-105 MG chewable tablet chewable tablet Chew Daily.     • cefprozil (CEFZIL) 500 MG tablet Take 1 tablet by mouth 2 (Two) Times a Day. 14 tablet 0   • folic acid (FOLVITE) 400 MCG tablet Take 400 mcg by mouth Daily.       No facility-administered medications prior to visit.         Patient Active Problem List   Diagnosis   • Hyperlipidemia   • GERD (gastroesophageal reflux disease)   • Essential hypertension   • Degenerative joint disease involving multiple joints   • Constipation   • Nuclear cataract   • Episcleritis   • Corneal ulcer, marginal   • Palpitation   • Abnormal EKG   • Dizziness   • Precordial pain   • Osteoporosis   • Irritable bowel syndrome       Advanced Care Planning:  ACP discussion was held with the patient during this visit. Patient has an advance directive in EMR which is still valid.     Review of Systems   Constitutional: Negative.  Negative for chills, fatigue, fever and unexpected weight change.   HENT: Negative.  Negative for congestion, ear pain, hearing loss, nosebleeds, rhinorrhea, sneezing, sore throat and tinnitus.    Eyes: Negative.  Negative for discharge.   Respiratory: Negative.  Negative for cough, shortness of breath and wheezing.    Cardiovascular: Negative.  Negative for chest pain and palpitations.   Gastrointestinal: Negative.  Negative for abdominal pain, constipation, diarrhea, nausea and vomiting.   Endocrine: Negative.    Genitourinary: Negative.  Negative for dysuria, frequency and urgency.   Musculoskeletal: Negative.  Negative for arthralgias, back pain, joint swelling, myalgias and neck pain.   Skin: Negative.  Negative for rash.   Allergic/Immunologic: Negative.    Neurological: Negative.  Negative for dizziness, weakness, numbness and headaches.   Hematological: Negative.  Negative for adenopathy.   Psychiatric/Behavioral: Negative.  Negative for dysphoric mood and sleep disturbance. The patient is not nervous/anxious.        Compared to one year ago, the patient feels her physical health is the same.  Compared to one year ago, the patient feels her mental health is the same.    Reviewed chart for potential of high risk medication in the elderly: not applicable  Reviewed chart for potential of harmful drug interactions in the  "elderly:not applicable    Objective         Vitals:    11/02/20 1020   BP: 144/70   Pulse: 89   SpO2: 98%   Weight: 70.9 kg (156 lb 3.2 oz)   Height: 158.8 cm (62.5\")   PainSc: 0-No pain       Body mass index is 28.11 kg/m².  Discussed the patient's BMI with her. The BMI is above average; BMI management plan is completed.    Physical Exam  Vitals signs and nursing note reviewed.   Constitutional:       General: She is not in acute distress.     Appearance: She is well-developed.   HENT:      Head: Normocephalic and atraumatic.      Nose: Nose normal.   Eyes:      General:         Right eye: No discharge.         Left eye: No discharge.      Conjunctiva/sclera: Conjunctivae normal.      Pupils: Pupils are equal, round, and reactive to light.   Neck:      Thyroid: No thyromegaly.   Cardiovascular:      Rate and Rhythm: Normal rate and regular rhythm.      Heart sounds: Normal heart sounds.   Pulmonary:      Effort: Pulmonary effort is normal.      Breath sounds: Normal breath sounds.   Lymphadenopathy:      Cervical: No cervical adenopathy.   Skin:     General: Skin is warm and dry.   Neurological:      Mental Status: She is alert and oriented to person, place, and time.         Lab Results   Component Value Date    TRIG 129 10/28/2020    HDL 67 (H) 10/28/2020     (H) 10/28/2020    VLDL 23 10/28/2020        Assessment/Plan   Medicare Risks and Personalized Health Plan  CMS Preventative Services Quick Reference  Advance Directive Discussion  Breast Cancer/Mammogram Screening  Fall Risk  Immunizations Discussed/Encouraged (specific immunizations; Td and Shingrix )  Obesity/Overweight     The above risks/problems have been discussed with the patient.  Pertinent information has been shared with the patient in the After Visit Summary.  Follow up plans and orders are seen below in the Assessment/Plan Section.    Diagnoses and all orders for this visit:    1. Medicare annual wellness visit, subsequent (Primary)    2. " Mixed hyperlipidemia  -     Comprehensive Metabolic Panel; Future  -     Lipid Panel; Future  -     Vitamin D 25 Hydroxy; Future  -     Urinalysis With Culture If Indicated -; Future    3. Essential hypertension  -     Comprehensive Metabolic Panel; Future  -     Lipid Panel; Future  -     Vitamin D 25 Hydroxy; Future  -     Urinalysis With Culture If Indicated -; Future    4. Encounter for screening mammogram for malignant neoplasm of breast    5. Age-related osteoporosis without current pathological fracture  -     Vitamin D 25 Hydroxy; Future    Other orders  -     metoprolol succinate XL (TOPROL-XL) 25 MG 24 hr tablet; Take 1 tablet by mouth Daily.  Dispense: 90 tablet; Refill: 3      Follow Up:  Return in about 1 year (around 11/2/2021) for Annual physical, medicare wellness visit.     An After Visit Summary and PPPS were given to the patient.    Information has been scanned into chart. Discussed importance of taking medications as prescribed. Encouraged healthy eating habits with low fat, low salt choices and working towards maintaining a healthy weight. Recommended regular exercise if able as well as care to prevent falls including avoiding anything on the floor that they could slip or trip on such as throw rugs, making sure they have a bathmat to step onto when their feet are wet and having grab bars and railings where needed.

## 2020-11-02 NOTE — PATIENT INSTRUCTIONS
Check at pharmacy on Shingrix shingles vaccine  Check on tetanus vaccine at pharmacy or with insurance.     Medicare Wellness  Personal Prevention Plan of Service     Date of Office Visit:  2020  Encounter Provider:  Ida Shah MD  Place of Service:  [facility]  Patient Name: Robert Larios  :  1945    As part of the Medicare Wellness portion of your visit today, we are providing you with this personalized preventive plan of services (PPPS). This plan is based upon recommendations of the [place] Preventive Services Task Force (USPSTF) and the Advisory Committee on Immunization Practices (ACIP).    This lists the preventive care services that should be considered, and provides dates of when you are due. Items listed as completed are up-to-date and do not require any further intervention.    Health Maintenance   Topic Date Due   • ZOSTER VACCINE (2 of 2) 2017   • TDAP/TD VACCINES (2 - Td) 2018   • ANNUAL WELLNESS VISIT  10/16/2020   • MAMMOGRAM  10/16/2021   • DXA SCAN  10/16/2021   • LIPID PANEL  10/28/2021   • COLONOSCOPY  2028   • HEPATITIS C SCREENING  Completed   • INFLUENZA VACCINE  Completed   • Pneumococcal Vaccine 65+  Completed       No orders of the defined types were placed in this encounter.      Return in about 1 year (around 2021) for Annual physical, medicare wellness visit.

## 2020-11-05 LAB — FUNGUS WND CULT: NORMAL

## 2020-11-30 DIAGNOSIS — Z78.0 POST-MENOPAUSAL: Primary | ICD-10-CM

## 2020-11-30 DIAGNOSIS — Z12.31 ENCOUNTER FOR SCREENING MAMMOGRAM FOR MALIGNANT NEOPLASM OF BREAST: ICD-10-CM

## 2020-12-01 ENCOUNTER — TELEPHONE (OUTPATIENT)
Dept: ONCOLOGY | Facility: CLINIC | Age: 75
End: 2020-12-01

## 2020-12-01 NOTE — TELEPHONE ENCOUNTER
Robert called to cancel her 12/09. She's having dental work done soon and was advised by her dental surgeon to hold off on her treatment. She's going back to see him in about a month and will know when to reschedule for then.     Phone# [phone]

## 2020-12-09 ENCOUNTER — APPOINTMENT (OUTPATIENT)
Dept: ONCOLOGY | Facility: HOSPITAL | Age: 75
End: 2020-12-09

## 2021-01-19 ENCOUNTER — IMMUNIZATION (OUTPATIENT)
Dept: VACCINE CLINIC | Facility: HOSPITAL | Age: 76
End: 2021-01-19

## 2021-01-19 PROCEDURE — 91300 HC SARSCOV02 VAC 30MCG/0.3ML IM: CPT | Performed by: THORACIC SURGERY (CARDIOTHORACIC VASCULAR SURGERY)

## 2021-01-19 PROCEDURE — 0001A: CPT | Performed by: THORACIC SURGERY (CARDIOTHORACIC VASCULAR SURGERY)

## 2021-02-05 DIAGNOSIS — R00.2 PALPITATIONS: Primary | ICD-10-CM

## 2021-02-09 ENCOUNTER — IMMUNIZATION (OUTPATIENT)
Dept: VACCINE CLINIC | Facility: HOSPITAL | Age: 76
End: 2021-02-09

## 2021-02-09 PROCEDURE — 0002A: CPT | Performed by: THORACIC SURGERY (CARDIOTHORACIC VASCULAR SURGERY)

## 2021-02-09 PROCEDURE — 91300 HC SARSCOV02 VAC 30MCG/0.3ML IM: CPT | Performed by: THORACIC SURGERY (CARDIOTHORACIC VASCULAR SURGERY)

## 2021-03-03 ENCOUNTER — TELEPHONE (OUTPATIENT)
Dept: ONCOLOGY | Facility: CLINIC | Age: 76
End: 2021-03-03

## 2021-03-04 DIAGNOSIS — M81.0 AGE-RELATED OSTEOPOROSIS WITHOUT CURRENT PATHOLOGICAL FRACTURE: Primary | ICD-10-CM

## 2021-03-05 ENCOUNTER — APPOINTMENT (OUTPATIENT)
Dept: ONCOLOGY | Facility: HOSPITAL | Age: 76
End: 2021-03-05

## 2021-03-05 ENCOUNTER — LAB (OUTPATIENT)
Dept: LAB | Facility: HOSPITAL | Age: 76
End: 2021-03-05

## 2021-03-05 DIAGNOSIS — M81.0 AGE-RELATED OSTEOPOROSIS WITHOUT CURRENT PATHOLOGICAL FRACTURE: ICD-10-CM

## 2021-03-05 LAB
CALCIUM SPEC-SCNC: 9.8 MG/DL (ref 8.6–10.5)
MAGNESIUM SERPL-MCNC: 2.1 MG/DL (ref 1.6–2.4)
PHOSPHATE SERPL-MCNC: 3.8 MG/DL (ref 2.5–4.5)

## 2021-03-05 PROCEDURE — 83735 ASSAY OF MAGNESIUM: CPT

## 2021-03-05 PROCEDURE — 82310 ASSAY OF CALCIUM: CPT

## 2021-03-05 PROCEDURE — 84100 ASSAY OF PHOSPHORUS: CPT

## 2021-03-11 ENCOUNTER — INFUSION (OUTPATIENT)
Dept: ONCOLOGY | Facility: HOSPITAL | Age: 76
End: 2021-03-11

## 2021-03-11 VITALS — TEMPERATURE: 98.3 F | SYSTOLIC BLOOD PRESSURE: 156 MMHG | HEART RATE: 89 BPM | DIASTOLIC BLOOD PRESSURE: 89 MMHG

## 2021-03-11 DIAGNOSIS — M81.0 AGE-RELATED OSTEOPOROSIS WITHOUT CURRENT PATHOLOGICAL FRACTURE: Primary | ICD-10-CM

## 2021-03-11 PROCEDURE — 96372 THER/PROPH/DIAG INJ SC/IM: CPT | Performed by: NURSE PRACTITIONER

## 2021-03-11 PROCEDURE — 25010000002 DENOSUMAB 60 MG/ML SOLUTION PREFILLED SYRINGE: Performed by: GENERAL PRACTICE

## 2021-03-11 RX ADMIN — DENOSUMAB 60 MG: 60 INJECTION SUBCUTANEOUS at 13:37

## 2021-03-12 ENCOUNTER — OFFICE VISIT (OUTPATIENT)
Dept: CARDIOLOGY | Facility: CLINIC | Age: 76
End: 2021-03-12

## 2021-03-12 VITALS
WEIGHT: 163 LBS | HEART RATE: 98 BPM | OXYGEN SATURATION: 97 % | BODY MASS INDEX: 28.88 KG/M2 | HEIGHT: 63 IN | DIASTOLIC BLOOD PRESSURE: 72 MMHG | SYSTOLIC BLOOD PRESSURE: 136 MMHG

## 2021-03-12 DIAGNOSIS — I10 ESSENTIAL HYPERTENSION: Chronic | ICD-10-CM

## 2021-03-12 DIAGNOSIS — R00.2 PALPITATIONS: Primary | ICD-10-CM

## 2021-03-12 DIAGNOSIS — E78.2 MIXED HYPERLIPIDEMIA: Chronic | ICD-10-CM

## 2021-03-12 DIAGNOSIS — R00.2 PALPITATION: ICD-10-CM

## 2021-03-12 DIAGNOSIS — R07.2 PRECORDIAL PAIN: ICD-10-CM

## 2021-03-12 PROCEDURE — 93010 ELECTROCARDIOGRAM REPORT: CPT | Performed by: INTERNAL MEDICINE

## 2021-03-12 PROCEDURE — 93000 ELECTROCARDIOGRAM COMPLETE: CPT | Performed by: INTERNAL MEDICINE

## 2021-03-12 PROCEDURE — 99204 OFFICE O/P NEW MOD 45 MIN: CPT | Performed by: INTERNAL MEDICINE

## 2021-03-12 RX ORDER — FLUTICASONE PROPIONATE 0.05 %
CREAM (GRAM) TOPICAL
COMMUNITY
Start: 2021-01-20

## 2021-03-12 NOTE — PROGRESS NOTES
Robert Larios  75 y.o. female    03/12/2021  1. Palpitations    2. Mixed hyperlipidemia    3. Essential hypertension    4. Palpitation    5. Precordial pain        History of Present Illness  Robert Larios is a 75-year-old female who is being seen by me after long interval.  She presents for evaluation of intermittent fluttering the chest which have been going on for the past several weeks.  She described as skipped beats occurring sometimes on a daily basis and she has been keeping a record of this.  It could range from 1 or 2 skipped beats to 10-15 times a day.  In the beginning it would draw her attention but of late some of these episodes have been associated with sharp chest pains lasting for a few seconds.  She is also been keeping track of her blood pressure which have for the most part been in the normal range but at times mildly elevated.  She has history of hypertension, hyperlipidemia and no history of thyroid problems or coronary artery disease.    She was evaluated for an abnormal EKG and chest pain/palpitation back in July 2017 and further testing included an echocardiogram which showed the following findings:  · Left ventricular systolic function is normal. Estimated EF = 60%.  · Left ventricular wall thickness is consistent with borderline concentric hypertrophy.  · Left ventricular diastolic dysfunction (grade I) consistent with impaired relaxation.    CTA OF THE CORONARY ARTERIES in July 2017 showed:: There is adequate visualization of  the left main, LAD, diagonals, circumflex, and RCA. Some motion  during study limits study somewhat. The patient is right  dominant.     Left Main: No calcified or soft itssue density plaque and no  stenosis.  LAD: No calcified or soft tissue density plaque and no stenosis.  Circumflex: No calcified or soft tissue density plaque and no  stenosis.  RCA: No calcified or soft tisue density plaque and no stenosis.     The aortic valve is tricuspid. There is no  myocardial bridging.  On short axis views, the myocardium is homogeneous in thickness.     EXTRACARDIAC SOFT TISSUES:  Mediastinum: On the imaging, the ascending and descending aorta  are normal in caliber. There is no mediastinal or hilar  lymphadenopathy. The pericardium is normal.  Lungs: No consolidation or focal nodules are present. There is no  pneumothorax or effusion. The pulmonary arteries are normal in  appearance.  Abdomen: No evidence of hiatal hernia. The imaged liver and  spleen are unremarkable. There is no lymphadenopathy in the upper  abdomen.  Bones: There are no lytic or blastic lesions within the osseous  structures.     CT FUNCTIONAL ANALYSIS:  Ejection Fraction     53 %  Diastolic Volume     82 ml  Systolic Volume      38 ml  Stroke Volume        44 ml  Cardiac Output       3.5 L/minute  IMPRESSION:  CONCLUSION: Normal study which is somewhat limited due to motion.    Clinical exam today showed normal heart rate and blood pressure and no signs of congestive heart failure was noted.  I did not appreciate any skipped beats.  The heart rate was 98 bpm.    EKG today showed sinus rhythm with heart rate of 98 bpm.  Nonspecific ST-T changes which are diffuse.  Unchanged from previous EKG. unchanged from previous EKGs.    SUBJECTIVE    Allergies   Allergen Reactions   • Adhesive Tape      Blistering of Skin   • Garlic Other (See Comments)   • Lactose Other (See Comments)   • Propoxyphene Nausea And Vomiting   • Sulfamethoxazole Other (See Comments)   • Trimethoprim Other (See Comments)   • Bactrim [Sulfamethoxazole-Trimethoprim] Rash   • Macrodantin [Nitrofurantoin Macrocrystal]      Drug Fever   • Sulfa Antibiotics Rash         Past Medical History:   Diagnosis Date   • Acute otitis media 09/10/2015   • Acute pharyngitis 09/10/2015   • Acute sinusitis 09/10/2015   • Biliary colic 10/17/2014    STONES BY REVIEW US   • Breast cyst    • Change in bowel habits 06/28/2016   • Chest rales 9/262015    MEDIUM    • Cholelithiasis without obstruction 10/17/2014   • Chronic cholecystitis 10/17/2014   • Constipation 02/26/2015   • Degenerative joint disease involving multiple joints 09/10/2014   • Elevated blood-pressure reading without diagnosis of hypertension 05/31/2012    BLOOD PRESSURE NORMAL AT HOME   • Epigastric pain 09/10/2014   • Essential hypertension 07/30/2015   • Fibrocystic breast    • Gastritis 02/26/2015   • GERD (gastroesophageal reflux disease) 01/28/2015   • Hammer toe 12/02/2015   • History of colonoscopy 08/28/2014    Information taken from [facility]    • Hyperlipidemia 07/30/2015   • Ingrowing nail [id number]   • Ingrown toenail    • Irritable bowel disease    • Pain in right foot 11/11/2015   • Peripheral neuropathy 09/10/2014   • Plantar fasciitis          Past Surgical History:   Procedure Laterality Date   • BREAST BIOPSY  11/22/2004    R mammotome biop w/ image guided placement of metallic localization clip & stereotactic location & guidance for breast biop. radiologic examination of the surgical specimen & unilateral mammography   • BREAST CYST ASPIRATION     • CHOLECYSTECTOMY  10/09/2014   • COLONOSCOPY  08/28/2014    Hemorrhoids found.   • COLONOSCOPY N/A 12/6/2018    Procedure: COLONOSCOPY;  Surgeon: Everardo Beltran DO;  Location: [facility] ENDOSCOPY;  Service: Gastroenterology   • DILATATION AND CURETTAGE  08/28/2014    Postmenopausal bleeding   • ENDOSCOPY  08/28/2014    Normal hypopharynx, esophagus. A hiatus hernia found in the GE junction.Non-erosive gastritis found inthe stomach. Multiple biopsies taken.Moderate amount of bile sained fluid in the stomach.Normal, symmetrical, and patent pylorus.Normal duodenum.   • INJECTION OF MEDICATION  07/30/2015    KENALOG(1)   • TONSILLECTOMY           Family History   Problem Relation Age of Onset   • Thyroid disease Mother    • Hypertension Mother    • Obesity Mother    • Osteoporosis Mother    • Stroke Mother    • Prostate cancer Father    • Cancer Father     • Diabetes Father    • Heart attack Father    • Hypertension Father    • Heart disease Father    • Obesity Father    • Cancer Other    • Diabetes Other    • Heart disease Other    • Stroke Other    • Thyroid disease Other    • Arthritis Maternal Grandmother    • Kidney disease Maternal Grandmother    • Stroke Paternal Grandmother    • Diabetes Sister          Social History     Socioeconomic History   • Marital status:      Spouse name: Not on file   • Number of children: Not on file   • Years of education: Not on file   • Highest education level: Not on file   Tobacco Use   • Smoking status: Never Smoker   • Smokeless tobacco: Never Used   Substance and Sexual Activity   • Alcohol use: No   • Drug use: No   • Sexual activity: Defer         Current Outpatient Medications   Medication Sig Dispense Refill   • aspirin 81 MG EC tablet Take 81 mg by mouth 2 (Two) Times a Week.     • Calcium Carbonate-Vit D-Min (CALTRATE PLUS PO) Take 1 tablet by mouth 2 (Two) Times a Day.     • Cholecalciferol (VITAMIN D3) 5000 units capsule capsule Take 4,000 Units by mouth Daily.     • colestipol (COLESTID) 1 g tablet As Needed.     • denosumab (PROLIA) 60 MG/ML solution prefilled syringe syringe Inject  under the skin into the appropriate area as directed. Every 6 mths     • esomeprazole (nexIUM) 20 MG capsule Take 20 mg by mouth 2 (Two) Times a Day.     • fluticasone (CUTIVATE) 0.05 % cream      • loperamide (IMODIUM) 2 MG capsule Take 2 mg by mouth As Needed for diarrhea (2 times per week).     • melatonin 1 MG tablet Take  by mouth At Night As Needed for Sleep.     • metoprolol succinate XL (TOPROL-XL) 25 MG 24 hr tablet Take 1 tablet by mouth Daily. 90 tablet 3   • minocycline (MINOCIN,DYNACIN) 100 MG capsule Take 100 mg by mouth Daily.     • NON FORMULARY 30mg vitamin B-6  400 mcg vitamin B-12  350 mg DHA  150 mg EPA  200 mg L-Carnitine  50mg CO-Q 10  30 mg trans-resveratrol     • Omega-3 350 MG capsule delayed-release   "      No current facility-administered medications for this visit.         OBJECTIVE    /72   Pulse 98   Ht 158.8 cm (62.5\")   Wt 73.9 kg (163 lb)   SpO2 97%   BMI 29.34 kg/m²         Review of Systems     Constitutional:  Denies recent weight loss, weight gain, fever or chills     HENT:  Denies any hearing loss, epistaxis, hoarseness, or difficulty speaking.     Eyes: Wears eyeglasses or contact lenses     Respiratory:  Denies dyspnea with exertion,no cough, wheezing, or hemoptysis.     Cardiovascular: See HPI    Gastrointestinal:  Denies change in bowel habits, dyspepsia, ulcer disease, hematochezia, or melena.     Endocrine: Negative for cold intolerance, heat intolerance, polydipsia, polyphagia and polyuria.     Genitourinary: Negative.      Musculoskeletal: Denies any history of arthritic symptoms or back problems     Neurological:  Denies any history of recurrent headaches, strokes, TIA, or seizure disorder.     Hematological: Denies any food allergies, seasonal allergies, bleeding disorders, or lymphadenopathy.     Psychiatric/Behavioral: Denies any history of depression, substance abuse, or change in cognitive function.       Physical Exam     Constitutional: Cooperative, alert and oriented, well-developed, well-nourished, in no acute distress.     HENT:   Head: Normocephalic, normal hair patterns, no masses or tenderness.  Ears, Nose, and Throat: No gross abnormalities. No pallor or cyanosis. Dentition good.   Eyes: EOMS intact, PERRL, conjunctivae and lids unremarkable. Fundoscopic exam and visual fields not performed.   Neck: No palpable masses or adenopathy, no thyromegaly, no JVD, carotid pulses are full and equal bilaterally and without  Bruits.     Cardiovascular: Regular rhythm, S1 and S2 normal, no S3 or S4.  No murmurs, gallops, or rubs detected.     Pulmonary/Chest: Chest: normal symmetry, normal respiratory excursion, no intercostal retraction, no use of accessory muscles.            " Pulmonary: Normal breath sounds. No rales or ronchi.    Abdominal: Abdomen soft, bowel sounds normoactive, no masses, no hepatosplenomegaly, non-tender, no bruits.     Musculoskeletal: No deformities, clubbing, cyanosis, erythema, or edema observed.     Neurological: No gross motor or sensory deficits noted, affect appropriate, oriented to time, person, place.     Skin: Warm and dry to the touch, no apparent skin lesions or masses noted.     Psychiatric: She has a normal mood and affect. Her behavior is normal. Judgment and thought content normal.         Procedures      Lab Results   Component Value Date    WBC 6.73 07/14/2017    HGB 14.9 07/14/2017    HCT 44.7 07/14/2017    MCV 92.2 07/14/2017     07/14/2017     Lab Results   Component Value Date    GLUCOSE 98 10/28/2020    BUN 12 10/28/2020    CREATININE 0.72 10/28/2020    EGFRIFNONA 79 10/28/2020    BCR 16.7 10/28/2020    CO2 24.7 10/28/2020    CALCIUM 9.8 03/05/2021    ALBUMIN 4.40 10/28/2020    AST 26 10/28/2020    ALT 28 10/28/2020     Lab Results   Component Value Date    CHOL 203 (H) 10/28/2020    CHOL 201 (H) 10/14/2019    CHOL 208 (H) 10/11/2018     Lab Results   Component Value Date    TRIG 129 10/28/2020    TRIG 144 10/14/2019    TRIG 165 10/11/2018     Lab Results   Component Value Date    HDL 67 (H) 10/28/2020    HDL 58 10/14/2019    HDL 62 10/11/2018     No components found for: LDLCALC  Lab Results   Component Value Date     (H) 10/28/2020     (H) 10/14/2019     (H) 10/14/2019     No results found for: HDLLDLRATIO  No components found for: CHOLHDL  Lab Results   Component Value Date    HGBA1C 5.6 07/27/2015     Lab Results   Component Value Date    TSH 0.530 08/03/2017           ASSESSMENT AND PLAN  Robert Larios is a 75-year-old female with history of hypertension, mild hyperlipidemia, who is being evaluated for intermittent skipped beats going back several months.  PACs/PVCs likely.  Suspicion for sustained arrhythmia  low.  Previous evaluation for coronary artery disease in July 2017 was negative.  Calcium score was 0.  No CAD was noted.  Her chest pain has atypical features and suspicion for ongoing ischemia is low.  She does have longstanding nonspecific ST-T changes on EKG.  Anxiety could be a contributing factor.  The plan would be to proceed with an event monitor for 21 days to make sure that there are no sustained cardiac arrhythmias.  An echocardiogram to reassess left ventricular and valvular function is being arranged.  I will consider increasing the dose of metoprolol succinate to 50 mg daily after reviewing the results of the above tests.  Thyroid function studies are being checked.  A Lexiscan or exercise Cardiolite stress test will be considered if the chest pain persists.  Thank you for asked me to see this patient.    Diagnoses and all orders for this visit:    1. Palpitations (Primary)  -     ECG 12 Lead  -     T4, Free; Future  -     TSH  -     Adult Transthoracic Echo Complete w/ Color, Spectral and Contrast if Necessary Per Protocol; Future  -     Mobile Cardiac Outpatient Telemetry; Future    2. Mixed hyperlipidemia  -     T4, Free; Future  -     TSH  -     Adult Transthoracic Echo Complete w/ Color, Spectral and Contrast if Necessary Per Protocol; Future  -     Mobile Cardiac Outpatient Telemetry; Future    3. Essential hypertension  -     T4, Free; Future  -     TSH  -     Adult Transthoracic Echo Complete w/ Color, Spectral and Contrast if Necessary Per Protocol; Future  -     Mobile Cardiac Outpatient Telemetry; Future    4. Palpitation  -     T4, Free; Future  -     TSH  -     Adult Transthoracic Echo Complete w/ Color, Spectral and Contrast if Necessary Per Protocol; Future  -     Mobile Cardiac Outpatient Telemetry; Future    5. Precordial pain        Patient's Body mass index is 29.34 kg/m². BMI is above normal parameters. Recommendations include: exercise counseling and nutrition counseling.      Robert  Lekathy Larios  reports that she has never smoked. She has never used smokeless tobacco..          Peggy Govea MD  3/12/2021  08:26 CST

## 2021-03-13 LAB
QT INTERVAL: 374 MS
QTC INTERVAL: 477 MS

## 2021-03-15 ENCOUNTER — LAB (OUTPATIENT)
Dept: LAB | Facility: HOSPITAL | Age: 76
End: 2021-03-15

## 2021-03-15 DIAGNOSIS — I10 ESSENTIAL HYPERTENSION: Chronic | ICD-10-CM

## 2021-03-15 DIAGNOSIS — R00.2 PALPITATION: ICD-10-CM

## 2021-03-15 DIAGNOSIS — E78.2 MIXED HYPERLIPIDEMIA: Chronic | ICD-10-CM

## 2021-03-15 DIAGNOSIS — R00.2 PALPITATIONS: ICD-10-CM

## 2021-03-15 LAB
T4 FREE SERPL-MCNC: 1.13 NG/DL (ref 0.93–1.7)
TSH SERPL DL<=0.05 MIU/L-ACNC: 1.84 UIU/ML (ref 0.27–4.2)

## 2021-03-15 PROCEDURE — 36415 COLL VENOUS BLD VENIPUNCTURE: CPT

## 2021-03-15 PROCEDURE — 84443 ASSAY THYROID STIM HORMONE: CPT | Performed by: INTERNAL MEDICINE

## 2021-03-15 PROCEDURE — 84439 ASSAY OF FREE THYROXINE: CPT

## 2021-04-13 ENCOUNTER — OFFICE VISIT (OUTPATIENT)
Dept: CARDIOLOGY | Facility: CLINIC | Age: 76
End: 2021-04-13

## 2021-04-13 VITALS
BODY MASS INDEX: 28.08 KG/M2 | HEIGHT: 63 IN | HEART RATE: 99 BPM | SYSTOLIC BLOOD PRESSURE: 138 MMHG | DIASTOLIC BLOOD PRESSURE: 78 MMHG | OXYGEN SATURATION: 96 % | WEIGHT: 158.5 LBS

## 2021-04-13 DIAGNOSIS — R00.2 PALPITATION: Primary | ICD-10-CM

## 2021-04-13 DIAGNOSIS — E78.2 MIXED HYPERLIPIDEMIA: Chronic | ICD-10-CM

## 2021-04-13 DIAGNOSIS — I10 ESSENTIAL HYPERTENSION: Chronic | ICD-10-CM

## 2021-04-13 PROCEDURE — 99213 OFFICE O/P EST LOW 20 MIN: CPT | Performed by: INTERNAL MEDICINE

## 2021-04-13 PROCEDURE — 93000 ELECTROCARDIOGRAM COMPLETE: CPT | Performed by: INTERNAL MEDICINE

## 2021-04-13 RX ORDER — METOPROLOL SUCCINATE 50 MG/1
50 TABLET, EXTENDED RELEASE ORAL DAILY
Qty: 90 TABLET | Refills: 3 | Status: SHIPPED | OUTPATIENT
Start: 2021-04-13 | End: 2022-04-15 | Stop reason: SDUPTHER

## 2021-04-13 RX ORDER — UBIDECARENONE 100 MG
100 CAPSULE ORAL DAILY
COMMUNITY

## 2021-04-13 NOTE — PROGRESS NOTES
Robert Larios  75 y.o. female      1. Palpitation    2. Mixed hyperlipidemia    3. Essential hypertension        History of Present Illness  Robert Larios is a 75-year-old female who was evaluated by me about a month ago for intermittent episodes of fluttering in the chest/palpitation.  She also had atypical sharp chest pain associated with it.  She has had history of hypertension, hyperlipidemia.      She was evaluated back in July 2017 with an echocardiogram which showed normal LV systolic function with an EF of 60% with borderline LVH.  There was grade 1 diastolic dysfunction.    CTA OF THE CORONARY ARTERIES in July 2017 showed EF of 53% with no evidence of coronary artery disease.    Further work-up included:  Echocardiogram on 4/3/2021 showed:  · Estimated left ventricular EF = 65% Left ventricular ejection fraction appears to be 61 - 65%. Left ventricular systolic function is normal.  · Left ventricular diastolic function is consistent with (grade I) impaired relaxation.  · Estimated right ventricular systolic pressure from tricuspid regurgitation is normal (<35 mmHg).  Event monitoring showed:  Result: Underlying rhythm was sinus with heart rate ranging from 56 bpm to 102 bpm with an average heart rate of 80 bpm.  There were 380 PVCs which represented less than 1% of total beats.  There where 1002 PACs which also represented less than 1% of total beats.  There were 11 short nonsustained runs of PACs.  No definite atrial fibrillation was noted   There were 157 patient triggered events which mostly correlated with sinus rhythm with ectopy.  No sustained arrhythmias were noted.  Symptoms included palpitation, chest discomfort.  Impression: Event monitoring showing essentially sinus rhythm with rare PACs and PVCs with no sustained supraventricular ventricular arrhythmias.  Events correlated with sinus rhythm with ectopy.  No definite atrial fibrillation noted.    EKG today showed sinus rhythm with heart  rate of 99 bpm.  Nonspecific ST-T changes.  QTc interval 492 ms.    Clinical exam today was unremarkable except for a slightly increased heart rate of 90 bpm and blood pressure 138/78 mmHg.  No signs of bronchospasm or congestive heart failure was noted.    SUBJECTIVE    Allergies   Allergen Reactions   • Adhesive Tape      Blistering of Skin   • Garlic Other (See Comments)   • Lactose Other (See Comments)   • Propoxyphene Nausea And Vomiting   • Sulfamethoxazole Other (See Comments)   • Trimethoprim Other (See Comments)   • Bactrim [Sulfamethoxazole-Trimethoprim] Rash   • Macrodantin [Nitrofurantoin Macrocrystal]      Drug Fever   • Sulfa Antibiotics Rash         Past Medical History:   Diagnosis Date   • Acute otitis media 09/10/2015   • Acute pharyngitis 09/10/2015   • Acute sinusitis 09/10/2015   • Biliary colic 10/17/2014    STONES BY REVIEW US   • Breast cyst    • Change in bowel habits 06/28/2016   • Chest rales 9/262015    MEDIUM   • Cholelithiasis without obstruction 10/17/2014   • Chronic cholecystitis 10/17/2014   • Constipation 02/26/2015   • Degenerative joint disease involving multiple joints 09/10/2014   • Elevated blood-pressure reading without diagnosis of hypertension 05/31/2012    BLOOD PRESSURE NORMAL AT HOME   • Epigastric pain 09/10/2014   • Essential hypertension 07/30/2015   • Fibrocystic breast    • Gastritis 02/26/2015   • GERD (gastroesophageal reflux disease) 01/28/2015   • Hammer toe 12/02/2015   • History of colonoscopy 08/28/2014    Information taken from [facility]    • Hyperlipidemia 07/30/2015   • Ingrowing nail [id number]   • Ingrown toenail    • Irritable bowel disease    • Pain in right foot 11/11/2015   • Peripheral neuropathy 09/10/2014   • Plantar fasciitis          Past Surgical History:   Procedure Laterality Date   • BREAST BIOPSY  11/22/2004    R mammotome biop w/ image guided placement of metallic localization clip & stereotactic location & guidance for breast biop. radiologic  examination of the surgical specimen & unilateral mammography   • BREAST CYST ASPIRATION     • CHOLECYSTECTOMY  10/09/2014   • COLONOSCOPY  08/28/2014    Hemorrhoids found.   • COLONOSCOPY N/A 12/6/2018    Procedure: COLONOSCOPY;  Surgeon: Everardo Beltran DO;  Location: [facility] ENDOSCOPY;  Service: Gastroenterology   • DILATATION AND CURETTAGE  08/28/2014    Postmenopausal bleeding   • ENDOSCOPY  08/28/2014    Normal hypopharynx, esophagus. A hiatus hernia found in the GE junction.Non-erosive gastritis found inthe stomach. Multiple biopsies taken.Moderate amount of bile sained fluid in the stomach.Normal, symmetrical, and patent pylorus.Normal duodenum.   • INJECTION OF MEDICATION  07/30/2015    KENALOG(1)   • TONSILLECTOMY           Family History   Problem Relation Age of Onset   • Thyroid disease Mother    • Hypertension Mother    • Obesity Mother    • Osteoporosis Mother    • Stroke Mother    • Prostate cancer Father    • Cancer Father    • Diabetes Father    • Heart attack Father    • Hypertension Father    • Heart disease Father    • Obesity Father    • Cancer Other    • Diabetes Other    • Heart disease Other    • Stroke Other    • Thyroid disease Other    • Arthritis Maternal Grandmother    • Kidney disease Maternal Grandmother    • Stroke Paternal Grandmother    • Diabetes Sister          Social History     Socioeconomic History   • Marital status:      Spouse name: Not on file   • Number of children: Not on file   • Years of education: Not on file   • Highest education level: Not on file   Tobacco Use   • Smoking status: Never Smoker   • Smokeless tobacco: Never Used   Substance and Sexual Activity   • Alcohol use: No   • Drug use: No   • Sexual activity: Defer         Current Outpatient Medications   Medication Sig Dispense Refill   • aspirin 81 MG EC tablet Take 81 mg by mouth 2 (Two) Times a Week.     • Calcium Carbonate-Vit D-Min (CALTRATE PLUS PO) Take 1 tablet by mouth 2 (Two) Times a Day.    "  • Cholecalciferol (VITAMIN D3) 5000 units capsule capsule Take 4,000 Units by mouth Daily.     • coenzyme Q10 100 MG capsule Take 100 mg by mouth Daily.     • denosumab (PROLIA) 60 MG/ML solution prefilled syringe syringe Inject  under the skin into the appropriate area as directed. Every 6 mths     • loperamide (IMODIUM) 2 MG capsule Take 2 mg by mouth As Needed for diarrhea (2 times per week).     • melatonin 1 MG tablet Take  by mouth At Night As Needed for Sleep.     • minocycline (MINOCIN,DYNACIN) 100 MG capsule Take 100 mg by mouth Daily.     • NON FORMULARY 30mg vitamin B-6  400 mcg vitamin B-12  350 mg DHA  150 mg EPA  200 mg L-Carnitine  50mg CO-Q 10  30 mg trans-resveratrol     • Omega-3 350 MG capsule delayed-release      • colestipol (COLESTID) 1 g tablet As Needed.     • fluticasone (CUTIVATE) 0.05 % cream      • metoprolol succinate XL (TOPROL-XL) 50 MG 24 hr tablet Take 1 tablet by mouth Daily. 90 tablet 3     No current facility-administered medications for this visit.         OBJECTIVE    /78   Pulse 99   Ht 158.8 cm (62.5\")   Wt 71.9 kg (158 lb 8 oz)   SpO2 96%   BMI 28.53 kg/m²         Review of Systems: The following systems are reviewed and no changes noted    Constitutional:  Denies recent weight loss, weight gain, fever or chills     HENT:  Denies any hearing loss, epistaxis, hoarseness, or difficulty speaking.     Eyes: Wears eyeglasses or contact lenses     Respiratory:  Denies dyspnea with exertion,no cough, wheezing, or hemoptysis.     Cardiovascular: See HPI    Gastrointestinal:  Denies change in bowel habits, dyspepsia, ulcer disease, hematochezia, or melena.     Endocrine: Negative for cold intolerance, heat intolerance, polydipsia, polyphagia and polyuria.     Genitourinary: Negative.      Musculoskeletal: Denies any history of arthritic symptoms or back problems     Neurological:  Denies any history of recurrent headaches, strokes, TIA, or seizure disorder. "     Hematological: Denies any food allergies, seasonal allergies, bleeding disorders, or lymphadenopathy.     Psychiatric/Behavioral: Denies any history of depression, substance abuse, or change in cognitive function.       Physical Exam: The following systems were reassessed and no changes noted    Constitutional: Cooperative, alert and oriented,  in no acute distress.     HENT:   Head: Normocephalic, normal hair patterns, no masses or tenderness.  Ears, Nose, and Throat: No gross abnormalities. No pallor or cyanosis. Dentition good.   Eyes: EOMS intact, PERRL, conjunctivae and lids unremarkable. Fundoscopic exam and visual fields not performed.   Neck: No palpable masses or adenopathy, no thyromegaly, no JVD, carotid pulses are full and equal bilaterally and without  Bruits.     Cardiovascular: Regular rhythm, S1 and S2 normal, no S3 or S4.  No murmurs, gallops, or rubs detected.     Pulmonary/Chest: Chest: normal symmetry, normal respiratory excursion, no intercostal retraction, no use of accessory muscles.            Pulmonary: Normal breath sounds. No rales or ronchi.    Abdominal: Abdomen soft, bowel sounds normoactive, no masses, no hepatosplenomegaly, non-tender, no bruits.     Musculoskeletal: No deformities, clubbing, cyanosis, erythema, or edema observed.     Neurological: No gross motor or sensory deficits noted, affect appropriate, oriented to time, person, place.     Skin: Warm and dry to the touch, no apparent skin lesions or masses noted.     Psychiatric: She has a normal mood and affect. Her behavior is normal. Judgment and thought content normal.         Procedures      Lab Results   Component Value Date    WBC 6.73 07/14/2017    HGB 14.9 07/14/2017    HCT 44.7 07/14/2017    MCV 92.2 07/14/2017     07/14/2017     Lab Results   Component Value Date    GLUCOSE 98 10/28/2020    BUN 12 10/28/2020    CREATININE 0.72 10/28/2020    EGFRIFNONA 79 10/28/2020    BCR 16.7 10/28/2020    CO2 24.7  10/28/2020    CALCIUM 9.8 03/05/2021    ALBUMIN 4.40 10/28/2020    AST 26 10/28/2020    ALT 28 10/28/2020     Lab Results   Component Value Date    CHOL 203 (H) 10/28/2020    CHOL 201 (H) 10/14/2019    CHOL 208 (H) 10/11/2018     Lab Results   Component Value Date    TRIG 129 10/28/2020    TRIG 144 10/14/2019    TRIG 165 10/11/2018     Lab Results   Component Value Date    HDL 67 (H) 10/28/2020    HDL 58 10/14/2019    HDL 62 10/11/2018     No components found for: LDLCALC  Lab Results   Component Value Date     (H) 10/28/2020     (H) 10/14/2019     (H) 10/14/2019     No results found for: HDLLDLRATIO  No components found for: CHOLHDL  Lab Results   Component Value Date    HGBA1C 5.6 07/27/2015     Lab Results   Component Value Date    TSH 1.840 03/15/2021           ASSESSMENT AND PLAN  Robert Larios is a 75-year-old female with history of hypertension, mild hyperlipidemia, with intermittent skipped beats going back several months.  She has no previous documented coronary artery disease and event monitoring showed no sustained arrhythmias.  She did have PACs/PVCs.  After reviewing her medications have increased the dose of metoprolol succinate to 50 mg daily and antiplatelet therapy with aspirin has been continued.  No further cardiac work-up is indicated at this time.  If her chest pain persists or if her symptoms do not improve I will consider a Lexiscan Cardiolite stress test.    Diagnoses and all orders for this visit:    1. Palpitation (Primary)  -     ECG 12 Lead    2. Mixed hyperlipidemia    3. Essential hypertension    Other orders  -     metoprolol succinate XL (TOPROL-XL) 50 MG 24 hr tablet; Take 1 tablet by mouth Daily.  Dispense: 90 tablet; Refill: 3        Patient's Body mass index is 28.53 kg/m². BMI is above normal parameters. Recommendations include: exercise counseling and nutrition counseling.      Robert Larios  reports that she has never smoked. She has never used  smokeless tobacco..          Peggy Govea MD  4/13/2021  11:14 CDT

## 2021-04-15 LAB
QT INTERVAL: 384 MS
QTC INTERVAL: 492 MS

## 2021-09-07 ENCOUNTER — APPOINTMENT (OUTPATIENT)
Dept: LAB | Facility: HOSPITAL | Age: 76
End: 2021-09-07

## 2021-09-07 DIAGNOSIS — M81.0 AGE-RELATED OSTEOPOROSIS WITHOUT CURRENT PATHOLOGICAL FRACTURE: Primary | ICD-10-CM

## 2021-09-08 ENCOUNTER — LAB (OUTPATIENT)
Dept: LAB | Facility: HOSPITAL | Age: 76
End: 2021-09-08

## 2021-09-08 DIAGNOSIS — M81.0 AGE-RELATED OSTEOPOROSIS WITHOUT CURRENT PATHOLOGICAL FRACTURE: ICD-10-CM

## 2021-09-08 LAB
ALBUMIN SERPL-MCNC: 4.1 G/DL (ref 3.5–5.2)
ALBUMIN/GLOB SERPL: 1.6 G/DL
ALP SERPL-CCNC: 40 U/L (ref 39–117)
ALT SERPL W P-5'-P-CCNC: 30 U/L (ref 1–33)
ANION GAP SERPL CALCULATED.3IONS-SCNC: 13 MMOL/L (ref 5–15)
AST SERPL-CCNC: 28 U/L (ref 1–32)
BILIRUB SERPL-MCNC: 0.3 MG/DL (ref 0–1.2)
BUN SERPL-MCNC: 13 MG/DL (ref 8–23)
BUN/CREAT SERPL: 17.1 (ref 7–25)
CALCIUM SPEC-SCNC: 9.1 MG/DL (ref 8.6–10.5)
CHLORIDE SERPL-SCNC: 104 MMOL/L (ref 98–107)
CO2 SERPL-SCNC: 24 MMOL/L (ref 22–29)
CREAT SERPL-MCNC: 0.76 MG/DL (ref 0.57–1)
GFR SERPL CREATININE-BSD FRML MDRD: 74 ML/MIN/1.73
GLOBULIN UR ELPH-MCNC: 2.6 GM/DL
GLUCOSE SERPL-MCNC: 103 MG/DL (ref 65–99)
MAGNESIUM SERPL-MCNC: 2.2 MG/DL (ref 1.6–2.4)
PHOSPHATE SERPL-MCNC: 3.2 MG/DL (ref 2.5–4.5)
POTASSIUM SERPL-SCNC: 3.9 MMOL/L (ref 3.5–5.2)
PROT SERPL-MCNC: 6.7 G/DL (ref 6–8.5)
SODIUM SERPL-SCNC: 141 MMOL/L (ref 136–145)

## 2021-09-08 PROCEDURE — 84100 ASSAY OF PHOSPHORUS: CPT

## 2021-09-08 PROCEDURE — 80053 COMPREHEN METABOLIC PANEL: CPT

## 2021-09-08 PROCEDURE — 83735 ASSAY OF MAGNESIUM: CPT

## 2021-09-08 PROCEDURE — 36415 COLL VENOUS BLD VENIPUNCTURE: CPT

## 2021-09-13 ENCOUNTER — INFUSION (OUTPATIENT)
Dept: ONCOLOGY | Facility: HOSPITAL | Age: 76
End: 2021-09-13

## 2021-09-13 VITALS
HEART RATE: 80 BPM | SYSTOLIC BLOOD PRESSURE: 152 MMHG | DIASTOLIC BLOOD PRESSURE: 74 MMHG | RESPIRATION RATE: 18 BRPM | TEMPERATURE: 98.1 F

## 2021-09-13 DIAGNOSIS — M81.0 AGE-RELATED OSTEOPOROSIS WITHOUT CURRENT PATHOLOGICAL FRACTURE: Primary | ICD-10-CM

## 2021-09-13 PROCEDURE — 96372 THER/PROPH/DIAG INJ SC/IM: CPT | Performed by: NURSE PRACTITIONER

## 2021-09-13 PROCEDURE — 25010000002 DENOSUMAB 60 MG/ML SOLUTION PREFILLED SYRINGE: Performed by: GENERAL PRACTICE

## 2021-09-13 RX ADMIN — DENOSUMAB 60 MG: 60 INJECTION SUBCUTANEOUS at 13:33

## 2021-10-14 ENCOUNTER — OFFICE VISIT (OUTPATIENT)
Dept: CARDIOLOGY | Facility: CLINIC | Age: 76
End: 2021-10-14

## 2021-10-14 VITALS
DIASTOLIC BLOOD PRESSURE: 90 MMHG | BODY MASS INDEX: 29.81 KG/M2 | HEART RATE: 92 BPM | SYSTOLIC BLOOD PRESSURE: 140 MMHG | HEIGHT: 62 IN | OXYGEN SATURATION: 94 % | WEIGHT: 162 LBS

## 2021-10-14 DIAGNOSIS — I10 ESSENTIAL HYPERTENSION: Chronic | ICD-10-CM

## 2021-10-14 DIAGNOSIS — R00.2 PALPITATION: Primary | ICD-10-CM

## 2021-10-14 DIAGNOSIS — E78.2 MIXED HYPERLIPIDEMIA: Chronic | ICD-10-CM

## 2021-10-14 PROCEDURE — 99213 OFFICE O/P EST LOW 20 MIN: CPT | Performed by: INTERNAL MEDICINE

## 2021-10-14 RX ORDER — PANTOPRAZOLE SODIUM 40 MG/1
40 TABLET, DELAYED RELEASE ORAL DAILY
COMMUNITY
Start: 2021-10-04 | End: 2022-04-15

## 2021-10-14 NOTE — PROGRESS NOTES
Robert Larios  76 y.o. female      1. Palpitation    2. Essential hypertension    3. Mixed hyperlipidemia        History of Present Illness  Robert Larios is a 76-year-old female with history of intermittent fluttering in the chest most likely due to PACs/PVCs.  No sustained arrhythmias have been documented thus far. She has had history of hypertension, hyperlipidemia.    He has no previous documented coronary artery disease and CT of the coronary arteries in July 2017 showed EF of 53% and no CAD.    Echocardiogram on 4/3/2021 showed:  · Estimated left ventricular EF = 65% Left ventricular ejection fraction appears to be 61 - 65%. Left ventricular systolic function is normal.  · Left ventricular diastolic function is consistent with (grade I) impaired relaxation.  · Estimated right ventricular systolic pressure from tricuspid regurgitation is normal (<35 mmHg).    Event monitoring in 4/ 2021 showed:  Result: Underlying rhythm was sinus with heart rate ranging from 56 bpm to 102 bpm with an average heart rate of 80 bpm.  There were 380 PVCs which represented less than 1% of total beats.  There where 1002 PACs which also represented less than 1% of total beats.  There were 11 short nonsustained runs of PACs.  No definite atrial fibrillation was noted   There were 157 patient triggered events which mostly correlated with sinus rhythm with ectopy.  No sustained arrhythmias were noted.  Symptoms included palpitation, chest discomfort.  Impression: Event monitoring showing essentially sinus rhythm with rare PACs and PVCs with no sustained supraventricular ventricular arrhythmias.  Events correlated with sinus rhythm with ectopy.  No definite atrial fibrillation noted.    The patient continues to have intermittent skipped beats which seems to catch her attention from time to time.  We had a discussion about the most likely benign nature of the arrhythmia and I stressed to her that there was no indication for  antiarrhythmic therapy at this time.    Allergies   Allergen Reactions   • Adhesive Tape      Blistering of Skin   • Garlic Other (See Comments)   • Lactose Other (See Comments)   • Propoxyphene Nausea And Vomiting   • Sulfamethoxazole Other (See Comments)   • Trimethoprim Other (See Comments)   • Bactrim [Sulfamethoxazole-Trimethoprim] Rash   • Macrodantin [Nitrofurantoin Macrocrystal]      Drug Fever   • Sulfa Antibiotics Rash         Past Medical History:   Diagnosis Date   • Acute otitis media 09/10/2015   • Acute pharyngitis 09/10/2015   • Acute sinusitis 09/10/2015   • Biliary colic 10/17/2014    STONES BY REVIEW US   • Breast cyst    • Change in bowel habits 06/28/2016   • Chest rales 9/262015    MEDIUM   • Cholelithiasis without obstruction 10/17/2014   • Chronic cholecystitis 10/17/2014   • Constipation 02/26/2015   • Degenerative joint disease involving multiple joints 09/10/2014   • Elevated blood-pressure reading without diagnosis of hypertension 05/31/2012    BLOOD PRESSURE NORMAL AT HOME   • Epigastric pain 09/10/2014   • Essential hypertension 07/30/2015   • Fibrocystic breast    • Gastritis 02/26/2015   • GERD (gastroesophageal reflux disease) 01/28/2015   • Hammer toe 12/02/2015   • History of colonoscopy 08/28/2014    Information taken from [facility]    • Hyperlipidemia 07/30/2015   • Ingrowing nail [id number]   • Ingrown toenail    • Irritable bowel disease    • Pain in right foot 11/11/2015   • Peripheral neuropathy 09/10/2014   • Plantar fasciitis          Past Surgical History:   Procedure Laterality Date   • BREAST BIOPSY  11/22/2004    R mammotome biop w/ image guided placement of metallic localization clip & stereotactic location & guidance for breast biop. radiologic examination of the surgical specimen & unilateral mammography   • BREAST CYST ASPIRATION     • CHOLECYSTECTOMY  10/09/2014   • COLONOSCOPY  08/28/2014    Hemorrhoids found.   • COLONOSCOPY N/A 12/6/2018    Procedure: COLONOSCOPY;   Surgeon: Everardo Beltran DO;  Location: [facility] ENDOSCOPY;  Service: Gastroenterology   • DILATATION AND CURETTAGE  08/28/2014    Postmenopausal bleeding   • ENDOSCOPY  08/28/2014    Normal hypopharynx, esophagus. A hiatus hernia found in the GE junction.Non-erosive gastritis found inthe stomach. Multiple biopsies taken.Moderate amount of bile sained fluid in the stomach.Normal, symmetrical, and patent pylorus.Normal duodenum.   • INJECTION OF MEDICATION  07/30/2015    KENALOG(1)   • TONSILLECTOMY           Family History   Problem Relation Age of Onset   • Thyroid disease Mother    • Hypertension Mother    • Obesity Mother    • Osteoporosis Mother    • Stroke Mother    • Prostate cancer Father    • Cancer Father    • Diabetes Father    • Heart attack Father    • Hypertension Father    • Heart disease Father    • Obesity Father    • Cancer Other    • Diabetes Other    • Heart disease Other    • Stroke Other    • Thyroid disease Other    • Arthritis Maternal Grandmother    • Kidney disease Maternal Grandmother    • Stroke Paternal Grandmother    • Diabetes Sister          Social History     Socioeconomic History   • Marital status:    Tobacco Use   • Smoking status: Never Smoker   • Smokeless tobacco: Never Used   Substance and Sexual Activity   • Alcohol use: No   • Drug use: No   • Sexual activity: Defer         Current Outpatient Medications   Medication Sig Dispense Refill   • aspirin 81 MG EC tablet Take 81 mg by mouth 1 (One) Time Per Week.     • Calcium Carbonate-Vit D-Min (CALTRATE PLUS PO) Take 1 tablet by mouth 2 (Two) Times a Day.     • Cholecalciferol (VITAMIN D3) 5000 units capsule capsule Take 4,000 Units by mouth Daily.     • coenzyme Q10 100 MG capsule Take 100 mg by mouth Daily.     • colestipol (COLESTID) 1 g tablet As Needed.     • denosumab (PROLIA) 60 MG/ML solution prefilled syringe syringe Inject  under the skin into the appropriate area as directed. Every 6 mths     • fluticasone  "(CUTIVATE) 0.05 % cream      • loperamide (IMODIUM) 2 MG capsule Take 2 mg by mouth As Needed for diarrhea (2 times per week).     • melatonin 1 MG tablet Take  by mouth At Night As Needed for Sleep.     • metoprolol succinate XL (TOPROL-XL) 50 MG 24 hr tablet Take 1 tablet by mouth Daily. 90 tablet 3   • NON FORMULARY 30mg vitamin B-6  400 mcg vitamin B-12  350 mg DHA  150 mg EPA  200 mg L-Carnitine  50mg CO-Q 10  30 mg trans-resveratrol     • Omega-3 350 MG capsule delayed-release      • pantoprazole (PROTONIX) 40 MG EC tablet Take 40 mg by mouth Daily.       No current facility-administered medications for this visit.         OBJECTIVE    /90 (BP Location: Left arm, Patient Position: Sitting, Cuff Size: Adult)   Pulse 92   Ht 157.5 cm (62\")   Wt 73.5 kg (162 lb)   SpO2 94%   BMI 29.63 kg/m²         Review of Systems: The following systems are reviewed and no changes noted    Constitutional:  Denies recent weight loss, weight gain, fever or chills     HENT:  Denies any hearing loss, epistaxis, hoarseness, or difficulty speaking.     Eyes: Wears eyeglasses or contact lenses     Respiratory:  Denies dyspnea with exertion,no cough, wheezing, or hemoptysis.     Cardiovascular: See HPI    Gastrointestinal:  Denies change in bowel habits, dyspepsia, ulcer disease, hematochezia, or melena.     Endocrine: Negative for cold intolerance, heat intolerance, polydipsia, polyphagia and polyuria.     Genitourinary: Negative.      Musculoskeletal: Denies any history of arthritic symptoms or back problems     Neurological:  Denies any history of recurrent headaches, strokes, TIA, or seizure disorder.     Hematological: Denies any food allergies, seasonal allergies, bleeding disorders, or lymphadenopathy.     Psychiatric/Behavioral: Denies any history of depression, substance abuse, or change in cognitive function.       Physical Exam: The following systems were reassessed and no changes noted    Constitutional: " Cooperative, alert and oriented,  in no acute distress.     HENT:   Head: Normocephalic, normal hair patterns, no masses or tenderness.  Ears, Nose, and Throat: No gross abnormalities. No pallor or cyanosis. Dentition good.   Eyes: EOMS intact, PERRL, conjunctivae and lids unremarkable. Fundoscopic exam and visual fields not performed.   Neck: No palpable masses or adenopathy, no thyromegaly, no JVD, carotid pulses are full and equal bilaterally and without  Bruits.     Cardiovascular: Regular rhythm, S1 and S2 normal, no S3 or S4.  No murmurs, gallops, or rubs detected.     Pulmonary/Chest: Chest: normal symmetry, normal respiratory excursion, no intercostal retraction, no use of accessory muscles.            Pulmonary: Normal breath sounds. No rales or ronchi.    Abdominal: Abdomen soft, bowel sounds normoactive, no masses, no hepatosplenomegaly, non-tender, no bruits.     Musculoskeletal: No deformities, clubbing, cyanosis, erythema, or edema observed.     Neurological: No gross motor or sensory deficits noted, affect appropriate, oriented to time, person, place.     Skin: Warm and dry to the touch, no apparent skin lesions or masses noted.     Psychiatric: She has a normal mood and affect. Her behavior is normal. Judgment and thought content normal.         Procedures      Lab Results   Component Value Date    WBC 6.73 07/14/2017    HGB 14.9 07/14/2017    HCT 44.7 07/14/2017    MCV 92.2 07/14/2017     07/14/2017     Lab Results   Component Value Date    GLUCOSE 103 (H) 09/08/2021    BUN 13 09/08/2021    CREATININE 0.76 09/08/2021    EGFRIFNONA 74 09/08/2021    BCR 17.1 09/08/2021    CO2 24.0 09/08/2021    CALCIUM 9.1 09/08/2021    ALBUMIN 4.10 09/08/2021    AST 28 09/08/2021    ALT 30 09/08/2021     Lab Results   Component Value Date    CHOL 203 (H) 10/28/2020    CHOL 201 (H) 10/14/2019    CHOL 208 (H) 10/11/2018     Lab Results   Component Value Date    TRIG 129 10/28/2020    TRIG 144 10/14/2019    TRIG  165 10/11/2018     Lab Results   Component Value Date    HDL 67 (H) 10/28/2020    HDL 58 10/14/2019    HDL 62 10/11/2018     No components found for: LDLCALC  Lab Results   Component Value Date     (H) 10/28/2020     (H) 10/14/2019     (H) 10/14/2019     No results found for: HDLLDLRATIO  No components found for: CHOLHDL  Lab Results   Component Value Date    HGBA1C 5.6 07/27/2015     Lab Results   Component Value Date    TSH 1.840 03/15/2021           ASSESSMENT AND PLAN  Robert Larios is a 75-year-old female with history of hypertension, mild hyperlipidemia, with intermittent skipped beats going back several months, due to PACs/PVCs with no sustained arrhythmias.  She has no previous documented coronary artery disease.  Her lab results from September 2021 were reviewed.  She will have blood work done by Dr. Shah in the near future.  Lipid profiles will be checked at that time.     Mentioned above, she was reassured and have continued antiplatelet therapy with aspirin and metoprolol succinate 50 mg daily.    Diagnoses and all orders for this visit:    1. Palpitation (Primary)    2. Essential hypertension    3. Mixed hyperlipidemia        Patient's Body mass index is 29.63 kg/m². BMI is above normal parameters. Recommendations include: exercise counseling and nutrition counseling.      Robert Larios  reports that she has never smoked. She has never used smokeless tobacco..          Peggy Govea MD  10/14/2021  18:35 CDT

## 2021-10-27 ENCOUNTER — LAB (OUTPATIENT)
Dept: LAB | Facility: HOSPITAL | Age: 76
End: 2021-10-27

## 2021-10-27 DIAGNOSIS — M81.0 AGE-RELATED OSTEOPOROSIS WITHOUT CURRENT PATHOLOGICAL FRACTURE: ICD-10-CM

## 2021-10-27 DIAGNOSIS — E78.2 MIXED HYPERLIPIDEMIA: Chronic | ICD-10-CM

## 2021-10-27 DIAGNOSIS — I10 ESSENTIAL HYPERTENSION: Chronic | ICD-10-CM

## 2021-10-27 LAB
25(OH)D3 SERPL-MCNC: 54.7 NG/ML (ref 30–100)
ALBUMIN SERPL-MCNC: 4.5 G/DL (ref 3.5–5.2)
ALBUMIN/GLOB SERPL: 1.6 G/DL
ALP SERPL-CCNC: 42 U/L (ref 39–117)
ALT SERPL W P-5'-P-CCNC: 38 U/L (ref 1–33)
ANION GAP SERPL CALCULATED.3IONS-SCNC: 12.5 MMOL/L (ref 5–15)
AST SERPL-CCNC: 37 U/L (ref 1–32)
BILIRUB SERPL-MCNC: 0.4 MG/DL (ref 0–1.2)
BUN SERPL-MCNC: 12 MG/DL (ref 8–23)
BUN/CREAT SERPL: 14.3 (ref 7–25)
CALCIUM SPEC-SCNC: 9.5 MG/DL (ref 8.6–10.5)
CHLORIDE SERPL-SCNC: 99 MMOL/L (ref 98–107)
CHOLEST SERPL-MCNC: 213 MG/DL (ref 0–200)
CO2 SERPL-SCNC: 24.5 MMOL/L (ref 22–29)
CREAT SERPL-MCNC: 0.84 MG/DL (ref 0.57–1)
GFR SERPL CREATININE-BSD FRML MDRD: 66 ML/MIN/1.73
GLOBULIN UR ELPH-MCNC: 2.9 GM/DL
GLUCOSE SERPL-MCNC: 99 MG/DL (ref 65–99)
HDLC SERPL-MCNC: 64 MG/DL (ref 40–60)
LDLC SERPL CALC-MCNC: 122 MG/DL (ref 0–100)
LDLC/HDLC SERPL: 1.84 {RATIO}
POTASSIUM SERPL-SCNC: 3.9 MMOL/L (ref 3.5–5.2)
PROT SERPL-MCNC: 7.4 G/DL (ref 6–8.5)
SODIUM SERPL-SCNC: 136 MMOL/L (ref 136–145)
TRIGL SERPL-MCNC: 157 MG/DL (ref 0–150)
VLDLC SERPL-MCNC: 27 MG/DL (ref 5–40)

## 2021-10-27 PROCEDURE — 80061 LIPID PANEL: CPT

## 2021-10-27 PROCEDURE — 80053 COMPREHEN METABOLIC PANEL: CPT

## 2021-10-27 PROCEDURE — 82306 VITAMIN D 25 HYDROXY: CPT

## 2021-10-27 PROCEDURE — 36415 COLL VENOUS BLD VENIPUNCTURE: CPT

## 2021-10-29 ENCOUNTER — LAB (OUTPATIENT)
Dept: LAB | Facility: HOSPITAL | Age: 76
End: 2021-10-29

## 2021-10-29 DIAGNOSIS — E78.2 MIXED HYPERLIPIDEMIA: Chronic | ICD-10-CM

## 2021-10-29 DIAGNOSIS — I10 ESSENTIAL HYPERTENSION: Chronic | ICD-10-CM

## 2021-10-29 LAB
BILIRUB UR QL STRIP: NEGATIVE
CLARITY UR: CLEAR
COLOR UR: YELLOW
GLUCOSE UR STRIP-MCNC: NEGATIVE MG/DL
HGB UR QL STRIP.AUTO: NEGATIVE
KETONES UR QL STRIP: NEGATIVE
LEUKOCYTE ESTERASE UR QL STRIP.AUTO: NEGATIVE
NITRITE UR QL STRIP: NEGATIVE
PH UR STRIP.AUTO: 6.5 [PH] (ref 5–8)
PROT UR QL STRIP: NEGATIVE
SP GR UR STRIP: 1.01 (ref 1–1.03)
UROBILINOGEN UR QL STRIP: NORMAL

## 2021-10-29 PROCEDURE — 81003 URINALYSIS AUTO W/O SCOPE: CPT

## 2021-11-03 ENCOUNTER — OFFICE VISIT (OUTPATIENT)
Dept: FAMILY MEDICINE CLINIC | Facility: CLINIC | Age: 76
End: 2021-11-03

## 2021-11-03 VITALS
SYSTOLIC BLOOD PRESSURE: 141 MMHG | HEIGHT: 62 IN | OXYGEN SATURATION: 98 % | BODY MASS INDEX: 29.5 KG/M2 | WEIGHT: 160.3 LBS | DIASTOLIC BLOOD PRESSURE: 80 MMHG | HEART RATE: 94 BPM

## 2021-11-03 DIAGNOSIS — M81.0 AGE-RELATED OSTEOPOROSIS WITHOUT CURRENT PATHOLOGICAL FRACTURE: ICD-10-CM

## 2021-11-03 DIAGNOSIS — M25.511 CHRONIC PAIN OF BOTH SHOULDERS: ICD-10-CM

## 2021-11-03 DIAGNOSIS — E55.9 VITAMIN D DEFICIENCY: ICD-10-CM

## 2021-11-03 DIAGNOSIS — G89.29 CHRONIC PAIN OF BOTH SHOULDERS: ICD-10-CM

## 2021-11-03 DIAGNOSIS — Z23 NEED FOR INFLUENZA VACCINATION: ICD-10-CM

## 2021-11-03 DIAGNOSIS — Z00.00 MEDICARE ANNUAL WELLNESS VISIT, SUBSEQUENT: Primary | ICD-10-CM

## 2021-11-03 DIAGNOSIS — M25.512 CHRONIC PAIN OF BOTH SHOULDERS: ICD-10-CM

## 2021-11-03 PROCEDURE — G0439 PPPS, SUBSEQ VISIT: HCPCS | Performed by: GENERAL PRACTICE

## 2021-11-03 PROCEDURE — G0008 ADMIN INFLUENZA VIRUS VAC: HCPCS | Performed by: GENERAL PRACTICE

## 2021-11-03 PROCEDURE — 1170F FXNL STATUS ASSESSED: CPT | Performed by: GENERAL PRACTICE

## 2021-11-03 PROCEDURE — 90662 IIV NO PRSV INCREASED AG IM: CPT | Performed by: GENERAL PRACTICE

## 2021-11-03 PROCEDURE — 1126F AMNT PAIN NOTED NONE PRSNT: CPT | Performed by: GENERAL PRACTICE

## 2021-11-03 PROCEDURE — 1159F MED LIST DOCD IN RCRD: CPT | Performed by: GENERAL PRACTICE

## 2021-11-03 NOTE — PROGRESS NOTES
The ABCs of the Annual Wellness Visit  Subsequent Medicare Wellness Visit    Chief Complaint   Patient presents with   • Medicare Wellness-subsequent   • Annual Exam   • Hypertension   • Hyperlipidemia      Subjective    History of Present Illness:  Robert Larios is a 76 y.o. female who presents for a Subsequent Medicare Wellness Visit. Due for mammogram and bone density.     The following portions of the patient's history were reviewed and   updated as appropriate: allergies, current medications, past family history, past medical history, past social history, past surgical history and problem list.    Compared to one year ago, the patient feels her physical   health is worse.    Compared to one year ago, the patient feels her mental   health is the same.    Recent Hospitalizations:  She was not admitted to the hospital during the last year.       Current Medical Providers:  Patient Care Team:  Ida Shah MD as PCP - General (Family Medicine)  Everardo Beltran DO as Consulting Physician (Gastroenterology)  Naveed Kim MD as Consulting Physician (Ophthalmology)  Peggy Govea MD as Consulting Physician (Cardiology)  Elmer Chin III, MD (Dermatology)    Outpatient Medications Prior to Visit   Medication Sig Dispense Refill   • aspirin 81 MG EC tablet Take 81 mg by mouth 1 (One) Time Per Week.     • Calcium Carbonate-Vit D-Min (CALTRATE PLUS PO) Take 1 tablet by mouth 2 (Two) Times a Day.     • Cholecalciferol (VITAMIN D3) 5000 units capsule capsule Take 4,000 Units by mouth Daily.     • coenzyme Q10 100 MG capsule Take 100 mg by mouth Daily.     • colestipol (COLESTID) 1 g tablet As Needed.     • denosumab (PROLIA) 60 MG/ML solution prefilled syringe syringe Inject  under the skin into the appropriate area as directed. Every 6 mths     • fluticasone (CUTIVATE) 0.05 % cream      • melatonin 1 MG tablet Take  by mouth At Night As Needed for Sleep.     • metoprolol  "succinate XL (TOPROL-XL) 50 MG 24 hr tablet Take 1 tablet by mouth Daily. 90 tablet 3   • NON FORMULARY 30mg vitamin B-6  400 mcg vitamin B-12  350 mg DHA  150 mg EPA  200 mg L-Carnitine  50mg CO-Q 10  30 mg trans-resveratrol     • Omega-3 350 MG capsule delayed-release      • pantoprazole (PROTONIX) 40 MG EC tablet Take 40 mg by mouth Daily.     • loperamide (IMODIUM) 2 MG capsule Take 2 mg by mouth As Needed for diarrhea (2 times per week).       No facility-administered medications prior to visit.       No opioid medication identified on active medication list. I have reviewed chart for other potential  high risk medication/s and harmful drug interactions in the elderly.          Aspirin is on active medication list. Aspirin use is indicated based on review of current medical condition/s. Pros and cons of this therapy have been discussed today. Benefits of this medication outweigh potential harm.  Patient has been encouraged to continue taking this medication.  .      Patient Active Problem List   Diagnosis   • Hyperlipidemia   • GERD (gastroesophageal reflux disease)   • Essential hypertension   • Degenerative joint disease involving multiple joints   • Constipation   • Nuclear cataract   • Episcleritis   • Corneal ulcer, marginal   • Palpitation   • Abnormal EKG   • Dizziness   • Precordial pain   • Osteoporosis   • Irritable bowel syndrome     Advance Care Planning  Advance Directive is on file.  ACP discussion was held with the patient during this visit. Patient has an advance directive in EMR which is still valid.           Objective    Vitals:    11/03/21 0953   BP: 141/80   BP Location: Left arm   Pulse: 94   SpO2: 98%   Weight: 72.7 kg (160 lb 4.8 oz)   Height: 157.5 cm (62\")   PainSc: 0-No pain     BMI Readings from Last 1 Encounters:   11/03/21 29.32 kg/m²   BMI is above normal parameters. Recommendations include: exercise counseling and nutrition counseling    Does the patient have evidence of " cognitive impairment? No    Physical Exam  Vitals and nursing note reviewed.   Constitutional:       General: She is not in acute distress.     Appearance: She is well-developed.   HENT:      Head: Normocephalic and atraumatic.      Nose: Nose normal.   Eyes:      General:         Right eye: No discharge.         Left eye: No discharge.      Conjunctiva/sclera: Conjunctivae normal.      Pupils: Pupils are equal, round, and reactive to light.   Neck:      Thyroid: No thyromegaly.      Trachea: No tracheal deviation.   Cardiovascular:      Rate and Rhythm: Normal rate and regular rhythm.      Heart sounds: Normal heart sounds. No murmur heard.      Pulmonary:      Effort: Pulmonary effort is normal. No respiratory distress.      Breath sounds: Normal breath sounds. No wheezing or rales.   Chest:      Chest wall: No tenderness.   Breasts:      Right: No inverted nipple, mass, nipple discharge, skin change or tenderness.      Left: No inverted nipple, mass, nipple discharge, skin change or tenderness.       Abdominal:      General: Bowel sounds are normal. There is no distension.      Palpations: Abdomen is soft. There is no mass.      Tenderness: There is no abdominal tenderness.      Hernia: No hernia is present.   Musculoskeletal:         General: No deformity. Normal range of motion.   Lymphadenopathy:      Cervical: No cervical adenopathy.   Skin:     General: Skin is warm and dry.   Neurological:      Mental Status: She is alert and oriented to person, place, and time.      Deep Tendon Reflexes: Reflexes are normal and symmetric.   Psychiatric:         Behavior: Behavior normal.         Thought Content: Thought content normal.         Judgment: Judgment normal.       Lab Results   Component Value Date    TRIG 157 (H) 10/27/2021    HDL 64 (H) 10/27/2021     (H) 10/27/2021    VLDL 27 10/27/2021            HEALTH RISK ASSESSMENT    Smoking Status:  Social History     Tobacco Use   Smoking Status Never Smoker    Smokeless Tobacco Never Used     Alcohol Consumption:  Social History     Substance and Sexual Activity   Alcohol Use No     Fall Risk Screen:    RAD Fall Risk Assessment was completed, and patient is at LOW risk for falls.Assessment completed on:11/3/2021    Depression Screening:  PHQ-2/PHQ-9 Depression Screening 11/3/2021   Little interest or pleasure in doing things 0   Feeling down, depressed, or hopeless 0   Trouble falling or staying asleep, or sleeping too much 2   Feeling tired or having little energy 0   Poor appetite or overeating 0   Feeling bad about yourself - or that you are a failure or have let yourself or your family down 0   Trouble concentrating on things, such as reading the newspaper or watching television 0   Moving or speaking so slowly that other people could have noticed. Or the opposite - being so fidgety or restless that you have been moving around a lot more than usual 0   Thoughts that you would be better off dead, or of hurting yourself in some way 0   Total Score 2   If you checked off any problems, how difficult have these problems made it for you to do your work, take care of things at home, or get along with other people? Not difficult at all       Health Habits and Functional and Cognitive Screening:  Functional & Cognitive Status 11/3/2021   Do you have difficulty preparing food and eating? No   Do you have difficulty bathing yourself, getting dressed or grooming yourself? No   Do you have difficulty using the toilet? No   Do you have difficulty moving around from place to place? No   Do you have trouble with steps or getting out of a bed or a chair? No   Current Diet Well Balanced Diet   Dental Exam Up to date   Eye Exam Up to date   Exercise (times per week) 2 times per week   Current Exercises Include -        Exercise Comment -   Current Exercise Activities Include -   Do you need help using the phone?  No   Are you deaf or do you have serious difficulty hearing?  No   Do  you need help with transportation? No   Do you need help shopping? No   Do you need help preparing meals?  No   Do you need help with housework?  No   Do you need help with laundry? No   Do you need help taking your medications? No   Do you need help managing money? No   Do you ever drive or ride in a car without wearing a seat belt? No   Have you felt unusual stress, anger or loneliness in the last month? No   Who do you live with? Spouse   If you need help, do you have trouble finding someone available to you? No   Have you been bothered in the last four weeks by sexual problems? No   Do you have difficulty concentrating, remembering or making decisions? No       Age-appropriate Screening Schedule:  Refer to the list below for future screening recommendations based on patient's age, sex and/or medical conditions. Orders for these recommended tests are listed in the plan section. The patient has been provided with a written plan.    Health Maintenance   Topic Date Due   • ZOSTER VACCINE (2 of 2) 09/28/2017   • TDAP/TD VACCINES (2 - Td or Tdap) 07/11/2018   • LIPID PANEL  10/27/2022   • MAMMOGRAM  11/03/2023   • DXA SCAN  11/03/2023   • INFLUENZA VACCINE  Completed              Assessment/Plan   CMS Preventative Services Quick Reference  Risk Factors Identified During Encounter  Immunizations Discussed/Encouraged (specific Immunizations; Tdap, Influenza, Shingrix and COVID19  Obesity/Overweight   The above risks/problems have been discussed with the patient.  Follow up actions/plans if indicated are seen below in the Assessment/Plan Section.  Pertinent information has been shared with the patient in the After Visit Summary.    Diagnoses and all orders for this visit:    1. Medicare annual wellness visit, subsequent (Primary)  -     CBC & Differential; Future  -     Comprehensive Metabolic Panel; Future  -     Lipid Panel; Future  -     Urinalysis With Culture If Indicated -; Future  -     Vitamin D 25 Hydroxy;  Future    2. Chronic pain of both shoulders  -     XR Shoulder 2+ View Bilateral    3. Need for influenza vaccination  -     Fluzone High-Dose 65+yrs ([id number])    4. Vitamin D deficiency  -     Vitamin D 25 Hydroxy; Future    5. Age-related osteoporosis without current pathological fracture  -     Vitamin D 25 Hydroxy; Future        Follow Up:   Return in about 1 year (around 11/3/2022) for Annual physical, medicare wellness visit.     An After Visit Summary and PPPS were made available to the patient.  Information has been scanned into chart. Discussed importance of taking medications as prescribed. Encouraged healthy eating habits with low fat, low salt choices and working towards maintaining a healthy weight. Recommended regular exercise if able as well as care to prevent falls including avoiding anything on the floor that they could slip or trip on such as throw rugs, making sure they have a bathmat to step onto when their feet are wet and having grab bars and railings where needed.

## 2021-11-03 NOTE — PATIENT INSTRUCTIONS
Medicare Wellness  Personal Prevention Plan of Service     Date of Office Visit:  2021  Encounter Provider:  Ida Shah MD  Place of Service:  [facility]  Patient Name: Robert Larios  :  1945    As part of the Medicare Wellness portion of your visit today, we are providing you with this personalized preventive plan of services (PPPS). This plan is based upon recommendations of the [place] Preventive Services Task Force (USPSTF) and the Advisory Committee on Immunization Practices (ACIP).    This lists the preventive care services that should be considered, and provides dates of when you are due. Items listed as completed are up-to-date and do not require any further intervention.    Health Maintenance   Topic Date Due   • ZOSTER VACCINE (2 of 2) 2017   • TDAP/TD VACCINES (2 - Td or Tdap) 2018   • COVID-19 Vaccine (3 - Pfizer booster) 2021   • LIPID PANEL  10/27/2022   • ANNUAL WELLNESS VISIT  2022   • MAMMOGRAM  2023   • DXA SCAN  2023   • COLORECTAL CANCER SCREENING  2028   • HEPATITIS C SCREENING  Completed   • INFLUENZA VACCINE  Completed   • Pneumococcal Vaccine 65+  Completed       Orders Placed This Encounter   Procedures   • XR Shoulder 2+ View Bilateral     Order Specific Question:   Reason for Exam:     Answer:   pain   • Fluzone High-Dose 65+yrs ([id number])       Return in about 1 year (around 11/3/2022) for Annual physical, medicare wellness visit.

## 2021-11-05 DIAGNOSIS — M25.511 CHRONIC PAIN OF BOTH SHOULDERS: Primary | ICD-10-CM

## 2021-11-05 DIAGNOSIS — M25.512 CHRONIC PAIN OF BOTH SHOULDERS: Primary | ICD-10-CM

## 2021-11-05 DIAGNOSIS — G89.29 CHRONIC PAIN OF BOTH SHOULDERS: Primary | ICD-10-CM

## 2021-11-10 ENCOUNTER — HOSPITAL ENCOUNTER (OUTPATIENT)
Dept: PHYSICAL THERAPY | Facility: HOSPITAL | Age: 76
Setting detail: THERAPIES SERIES
Discharge: HOME | End: 2021-11-10

## 2021-11-10 DIAGNOSIS — M25.512 BILATERAL SHOULDER PAIN, UNSPECIFIED CHRONICITY: Primary | ICD-10-CM

## 2021-11-10 DIAGNOSIS — M25.511 BILATERAL SHOULDER PAIN, UNSPECIFIED CHRONICITY: Primary | ICD-10-CM

## 2021-11-10 PROCEDURE — 97110 THERAPEUTIC EXERCISES: CPT | Performed by: PHYSICAL THERAPIST

## 2021-11-10 PROCEDURE — 97162 PT EVAL MOD COMPLEX 30 MIN: CPT | Performed by: PHYSICAL THERAPIST

## 2021-11-11 NOTE — THERAPY EVALUATION
Outpatient Physical Therapy Ortho Initial Evaluation  [facility]     Patient Name: Robert Larios  : 1945  MRN: [id number]  Today's Date: 11/10/2021      Visit Date: 11/10/2021  Visit   Return to MD: JENNA  Re-cert date: 21  Patient Active Problem List   Diagnosis   • Hyperlipidemia   • GERD (gastroesophageal reflux disease)   • Essential hypertension   • Degenerative joint disease involving multiple joints   • Constipation   • Nuclear cataract   • Episcleritis   • Corneal ulcer, marginal   • Palpitation   • Abnormal EKG   • Dizziness   • Precordial pain   • Osteoporosis   • Irritable bowel syndrome        Past Medical History:   Diagnosis Date   • Acute otitis media 09/10/2015   • Acute pharyngitis 09/10/2015   • Acute sinusitis 09/10/2015   • Biliary colic 10/17/2014    STONES BY REVIEW US   • Breast cyst    • Change in bowel habits 2016   • Chest rales [id number]    MEDIUM   • Cholelithiasis without obstruction 10/17/2014   • Chronic cholecystitis 10/17/2014   • Constipation 2015   • Degenerative joint disease involving multiple joints 09/10/2014   • Elevated blood-pressure reading without diagnosis of hypertension 2012    BLOOD PRESSURE NORMAL AT HOME   • Epigastric pain 09/10/2014   • Essential hypertension 2015   • Fibrocystic breast    • Gastritis 2015   • GERD (gastroesophageal reflux disease) 2015   • Hammer toe 2015   • History of colonoscopy 2014    Information taken from [facility]    • Hyperlipidemia 2015   • Ingrowing nail [id number]   • Ingrown toenail    • Irritable bowel disease    • Pain in right foot 2015   • Peripheral neuropathy 09/10/2014   • Plantar fasciitis         Past Surgical History:   Procedure Laterality Date   • BREAST BIOPSY  2004    R mammotome biop w/ image guided placement of metallic localization clip & stereotactic location & guidance for breast biop. radiologic examination of the surgical  specimen & unilateral mammography   • BREAST CYST ASPIRATION     • CHOLECYSTECTOMY  10/09/2014   • COLONOSCOPY  08/28/2014    Hemorrhoids found.   • COLONOSCOPY N/A 12/6/2018    Procedure: COLONOSCOPY;  Surgeon: Everardo Beltran DO;  Location: [facility] ENDOSCOPY;  Service: Gastroenterology   • DILATATION AND CURETTAGE  08/28/2014    Postmenopausal bleeding   • ENDOSCOPY  08/28/2014    Normal hypopharynx, esophagus. A hiatus hernia found in the GE junction.Non-erosive gastritis found inthe stomach. Multiple biopsies taken.Moderate amount of bile sained fluid in the stomach.Normal, symmetrical, and patent pylorus.Normal duodenum.   • INJECTION OF MEDICATION  07/30/2015    KENALOG(1)   • TONSILLECTOMY         Visit Dx:     ICD-10-CM ICD-9-CM   1. Bilateral shoulder pain, unspecified chronicity  M25.511 719.41    M25.512      Medications (Admitted on 11/10/2021)         aspirin 81 MG EC tablet    Calcium Carbonate-Vit D-Min (CALTRATE PLUS PO)    Cholecalciferol (VITAMIN D3) 5000 units capsule capsule    coenzyme Q10 100 MG capsule    colestipol (COLESTID) 1 g tablet    denosumab (PROLIA) 60 MG/ML solution prefilled syringe syringe    fluticasone (CUTIVATE) 0.05 % cream    melatonin 1 MG tablet    metoprolol succinate XL (TOPROL-XL) 50 MG 24 hr tablet    NON FORMULARY    Omega-3 350 MG capsule delayed-release    pantoprazole (PROTONIX) 40 MG EC tablet     Allergies        Adhesive Tape   GarlicOther (See Comments)   LactoseOther (See Comments)   PropoxypheneNausea And Vomiting   SulfamethoxazoleOther (See Comments)   TrimethoprimOther (See Comments)   Bactrim [Sulfamethoxazole-trimethoprim]Rash   Macrodantin [Nitrofurantoin Macrocrystal]   Sulfa AntibioticsRash     Patient History     Row Name 11/09/21 0657             History    Chief Complaint Joint stiffness (P)    -patient      Type of Pain Shoulder pain; Upper Extremity / Arm (P)    -patient      Brief Description of Current Complaint Bilateral but right shoulder  worse than left --in shoulder joint and upper arm pain at rest and with movement (P)    -patient      Patient/Caregiver Goals Relieve pain; Return to prior level of function; Improve mobility; Know what to do to help the symptoms (P)    -patient      Hand Dominance right-handed (P)    -patient      Occupation/sports/leisure activities Walking/yard work (P)    -patient      Patient seeing anyone else for problem(s)? Dr. Shields for neck and SI joint pain ---seeing him once a month (P)    -patient      What clinical tests have you had for this problem? X-ray; Other 1 (comment) (P)    -patient      Additional Clinical Tests Previous diagnosis of rotator cuff problem on left --I have been seen previously by Levi Toure (P)    -patient      Are you or can you be pregnant No (P)    -patient              Fall Risk Assessment    Any falls in the past year: No (P)    -patient              Services    Prior Rehab/Home Health Experiences Yes (P)    -patient      Are you currently receiving Home Health services No (P)    -patient      Do you plan to receive Home Health services in the near future No (P)    -patient              Daily Activities    Primary Language English (P)    -patient      Are you able to read Yes (P)    -patient      Are you able to write Yes (P)    -patient      How does patient learn best? Reading; Demonstration (P)    -patient              Safety    Are you being hurt, hit, or frightened by anyone at home or in your life? No (P)    -patient      Are you being neglected by a caregiver No (P)    -patient      Have you had any of the following issues with N/A (P)    -patient            User Key  (r) = Recorded By, (t) = Taken By, (c) = Cosigned By    Initials Name Provider Type    patient Robert Larios --                 PT Ortho     Row Name 11/10/21 2906       Subjective Comments    Subjective Comments 77 yo female with chronic L shoulder pain for the past 5 years. Diagnosed with rotator cuff  syndrome at the time. Injected with a steroid at the time, had skilled PT for 1 month at the time. Recent onset R>L shoulder pain 2 weeks ago, insidious onset. 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 Aggravating factors: reaching overhead, pouring tasks with each shoulder, pushing/puling tasks, Easing factors: heating pad  -BS       Precautions and Contraindications    Precautions/Limitations no known precautions/limitations  -BS       Subjective Pain    Able to rate subjective pain? yes  -BS    Pre-Treatment Pain Level 2  R upper trapezius and R ant deltoid region  -BS    Post-Treatment Pain Level 4  R shoulder  -BS    Subjective Pain Comment 6-7/10 R shoulder-intermittent; 2-3/10 L shoulder-intermittent  -BS       Posture/Observations    Posture/Observations Comments TTP along R UT and R ant deltoid, TTP along L pec major and L subscapularis  -BS       Special Tests/Palpation    Special Tests/Palpation Shoulder  -BS       Shoulder Impingement/Rotator Cuff Special Tests    Rotega-Pablo Test (RC Lesion vs. Bursitis) Left:; Positive  -BS    Empty Can Test (RC Lesion) Right:; Positive; Left:; Negative  -BS    Speed's Test (LH of Biceps Lesion) Bilateral:; Negative  -BS       Biceps/Labral Special Tests    [place]'s Test (Labral Test) Left:; Positive  -BS       General ROM    GENERAL ROM COMMENTS AROM (in degrees): R shoulder-flex 142 abd 85 ER 68 IR 55 L shoulder-flex 132 abd 105 ER 60 IR 52  -BS       MMT (Manual Muscle Testing)    General MMT Comments MMT: R shoulder-flex 4/5 abd 3+/5 ER 5/5 IR 5/5 elbow flex/ext 5/5 L shoulder-flex 5/5 abd 3+/5 ER 5/5 IR 5/5 elbow flex/ext 5/5  -BS       Sensation    Light Touch No apparent deficits  -BS    Additional Comments no UE ataxia  -BS          User Key  (r) = Recorded By, (t) = Taken By, (c) = Cosigned By    Initials Name Provider Type    Sander Bridges PT Physical Therapist                                   PT OP  Goals     Row Name 11/10/21 0850 11/10/21 0800       PT Short Term Goals    STG Date to Achieve 12/01/21  -BS --    STG 1 Patient indep with HEP  -BS --    STG 1 Progress New  -BS --    STG 2 Improve B shoulder abduction/IR AROM to 130° and 75°  -BS --    STG 2 Progress New  -BS --    STG 3 Improve B shoulder abduction MMT to 4+/5 or better  -BS --    STG 3 Progress New  -BS --    STG 4 Reduce B shoulder pain by 75% with overhead reaching and pushing/pulling tasks  -BS --    STG 4 Progress New  -BS --       Time Calculation    PT Goal Re-Cert Due Date --  -BS 12/01/21  -BS          User Key  (r) = Recorded By, (t) = Taken By, (c) = Cosigned By    Initials Name Provider Type    Sander Bridges, PT Physical Therapist                 PT Assessment/Plan     Row Name 11/10/21 0850          PT Assessment    Functional Limitations Performance in work activities; Performance in sport activities; Performance in self-care ADL; Performance in leisure activities; Limitation in home management  -BS     Impairments Impaired flexibility; Muscle strength; Pain; Range of motion  -BS     Assessment Comments Acute on chronic B shoulder pain due to suspected impingement. Presents with reduced ROM, weakness and pain with B shoulders. Would benefit from skilled PT to resume PLOF.  -BS     Please refer to paper survey for additional self-reported information Yes  -BS     Rehab Potential Good  -BS     Patient/caregiver participated in establishment of treatment plan and goals Yes  -BS     Patient would benefit from skilled therapy intervention Yes  -BS            PT Plan    PT Frequency 2x/week  -BS     Predicted Duration of Therapy Intervention (PT) 3 weeks then TBD  -BS     Planned CPT's? PT EVAL MOD COMPLELITY: 19231; PT RE-EVAL: 71804; PT THER PROC EA 15 MIN: 16566; PT MANUAL THERAPY EA 15 MIN: 70641; PT HOT OR COLD PACK TREAT MCARE; PT ELECTRICAL STIM UNATTEND: ; PT ULTRASOUND EA 15 MIN: 06874; PT THER SUPP EA 15 MIN  -BS      "Physical Therapy Interventions (Optional Details) home exercise program; joint mobilization; manual therapy techniques; modalities; neuromuscular re-education; patient/family education; ROM (Range of Motion); strengthening; stretching  -BS     PT Plan Comments Gently progress with AAROM/AROM with b shoulders. Instruct in seated pulleys, 6 way isometrics.  -BS           User Key  (r) = Recorded By, (t) = Taken By, (c) = Cosigned By    Initials Name Provider Type    Sander Bridges, PT Physical Therapist                   OP Exercises     Row Name 11/10/21 0834             Subjective Comments    Subjective Comments 77 yo female with chronic L shoulder pain for the past 5 years. Diagnosed with rotator cuff syndrome at the time. Injected with a steroid at the time, had skilled PT for 1 month at the time. Recent onset R>L shoulder pain 2 weeks ago, insidious onset. 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 Aggravating factors: reaching overhead, pouring tasks with each shoulder, pushing/puling tasks, Easing factors: heating pad  -BS              Subjective Pain    Able to rate subjective pain? yes  -BS      Pre-Treatment Pain Level 2  R upper trapezius and R ant deltoid region  -BS      Post-Treatment Pain Level 4  R shoulder  -BS      Subjective Pain Comment 6-7/10 R shoulder-intermittent; 2-3/10 L shoulder-intermittent  -BS              Exercise 1    Exercise Name 1 SL L sleeper S  -BS      Sets 1 1  -BS      Reps 1 15  -BS      Time 1 2\" hold  -BS              Exercise 2    Exercise Name 2 seated B shoulder flex AROM w/ dowel  -BS      Sets 2 1  -BS      Reps 2 15  -BS              Exercise 3    Exercise Name 3 standing B shoulder ext AROM w/ dowel  -BS      Sets 3 1  -BS      Reps 3 10  -BS              Exercise 4    Exercise Name 4 resisted mid rows w/ yellow TB  -BS      Sets 4 1  -BS      Reps 4 20  -BS            User Key  (r) = Recorded By, (t) = Taken By, (c) = " Cosigned By    Initials Name Provider Type    BS Sander Villalta, PT Physical Therapist                              Outcome Measure Options: Quick DASH  Quick DASH  Open a tight or new jar.: Mild Difficulty  Do heavy household chores (e.g., wash walls, wash floors): Moderate Difficulty  Carry a shopping bag or briefcase: Severe Difficulty  Wash your back: Severe Difficulty  Use a knife to cut food: No Difficulty  Recreational activities in which you take some force or impact through your arm, should or hand (e.g. golf, hammering, tennis, etc.): No Difficulty  During the past week, to what extent has your arm, shoulder, or hand problem interfered with your normal social activites with family, friends, neighbors or groups?: Not at all  During the past week, were you limited in your work or other regular daily activities as a result of your arm, shoulder or hand problem?: Moderately Limited  Arm, Shoulder, or hand pain: Severe  Tingling (pins and needles) in your arm, shoulder, or hand: None  During the past week, how much difficulty have you had sleeping because of the pain in your arm, shoulder or hand?: Moderate Difficiculty  Number of Questions Answered: 11  Quick DASH Score: 36.36         Time Calculation:     Start Time: 0850  Stop Time: 0945  Time Calculation (min): 55 min  Total Timed Code Minutes- PT: 55 minute(s)     Therapy Charges for Today     Code Description Service Date Service Provider Modifiers Qty    16959392882  PT EVAL MOD COMPLEXITY 3 11/10/2021 Sander Villalta, PT GP 1    17343452408 HC PT THER PROC EA 15 MIN 11/10/2021 Sander Villalta, PT GP 1          PT G-Codes  Outcome Measure Options: Quick DASH  Quick DASH Score: 36.36         Sander Villalta, PT  11/10/2021

## 2021-11-16 ENCOUNTER — HOSPITAL ENCOUNTER (OUTPATIENT)
Dept: PHYSICAL THERAPY | Facility: HOSPITAL | Age: 76
Setting detail: THERAPIES SERIES
Discharge: HOME | End: 2021-11-16

## 2021-11-16 DIAGNOSIS — M25.512 BILATERAL SHOULDER PAIN, UNSPECIFIED CHRONICITY: Primary | ICD-10-CM

## 2021-11-16 DIAGNOSIS — M25.511 BILATERAL SHOULDER PAIN, UNSPECIFIED CHRONICITY: Primary | ICD-10-CM

## 2021-11-16 PROCEDURE — 97110 THERAPEUTIC EXERCISES: CPT

## 2021-11-16 NOTE — THERAPY TREATMENT NOTE
Outpatient Physical Therapy Ortho Treatment Note  [facility]     Patient Name: Robert Larios  : 1945  MRN: [id number]  Today's Date: 2021      Visit Date: 2021   Attendance:     Subjective improvement:n/a  Recert: 21  MD Appointment: TBD      Visit Dx:    ICD-10-CM ICD-9-CM   1. Bilateral shoulder pain, unspecified chronicity  M25.511 719.41    M25.512        Patient Active Problem List   Diagnosis   • Hyperlipidemia   • GERD (gastroesophageal reflux disease)   • Essential hypertension   • Degenerative joint disease involving multiple joints   • Constipation   • Nuclear cataract   • Episcleritis   • Corneal ulcer, marginal   • Palpitation   • Abnormal EKG   • Dizziness   • Precordial pain   • Osteoporosis   • Irritable bowel syndrome        Past Medical History:   Diagnosis Date   • Acute otitis media 09/10/2015   • Acute pharyngitis 09/10/2015   • Acute sinusitis 09/10/2015   • Biliary colic 10/17/2014    STONES BY REVIEW US   • Breast cyst    • Change in bowel habits 2016   • Chest rales [id number]    MEDIUM   • Cholelithiasis without obstruction 10/17/2014   • Chronic cholecystitis 10/17/2014   • Constipation 2015   • Degenerative joint disease involving multiple joints 09/10/2014   • Elevated blood-pressure reading without diagnosis of hypertension 2012    BLOOD PRESSURE NORMAL AT HOME   • Epigastric pain 09/10/2014   • Essential hypertension 2015   • Fibrocystic breast    • Gastritis 2015   • GERD (gastroesophageal reflux disease) 2015   • Hammer toe 2015   • History of colonoscopy 2014    Information taken from [facility]    • Hyperlipidemia 2015   • Ingrowing nail [id number]   • Ingrown toenail    • Irritable bowel disease    • Pain in right foot 2015   • Peripheral neuropathy 09/10/2014   • Plantar fasciitis         Past Surgical History:   Procedure Laterality Date   • BREAST BIOPSY  2004    R mammotome  biop w/ image guided placement of metallic localization clip & stereotactic location & guidance for breast biop. radiologic examination of the surgical specimen & unilateral mammography   • BREAST CYST ASPIRATION     • CHOLECYSTECTOMY  10/09/2014   • COLONOSCOPY  08/28/2014    Hemorrhoids found.   • COLONOSCOPY N/A 12/6/2018    Procedure: COLONOSCOPY;  Surgeon: Everardo Beltran DO;  Location: [facility] ENDOSCOPY;  Service: Gastroenterology   • DILATATION AND CURETTAGE  08/28/2014    Postmenopausal bleeding   • ENDOSCOPY  08/28/2014    Normal hypopharynx, esophagus. A hiatus hernia found in the GE junction.Non-erosive gastritis found inthe stomach. Multiple biopsies taken.Moderate amount of bile sained fluid in the stomach.Normal, symmetrical, and patent pylorus.Normal duodenum.   • INJECTION OF MEDICATION  07/30/2015    KENALOG(1)   • TONSILLECTOMY                          PT Assessment/Plan     Row Name 11/16/21 0700          PT Assessment    Functional Limitations Performance in work activities; Performance in sport activities; Performance in self-care ADL; Performance in leisure activities; Limitation in home management  -EM     Impairments Impaired flexibility; Muscle strength; Pain; Range of motion  -EM     Assessment Comments Pt leslie tx well, she has a painful arc with flexion AAROM, therefore, therex was focused on above and below painful arc. No goals met this tx.  -EM     Rehab Potential Good  -EM     Patient/caregiver participated in establishment of treatment plan and goals Yes  -EM     Patient would benefit from skilled therapy intervention Yes  -EM            PT Plan    PT Frequency 2x/week  -EM     Predicted Duration of Therapy Intervention (PT) 3 wks then TBD  -EM     PT Plan Comments Cont gentle progression as leslie.Initiate more scap strengthening therex for improved posture.  -EM           User Key  (r) = Recorded By, (t) = Taken By, (c) = Cosigned By    Initials Name Provider Type    EM Yadiel Guy  "R, RYAN Physical Therapy Assistant                 Modalities     Row Name 11/16/21 0800             Subjective Comments    Subjective Comments Pt reports her R shoulder is worse than her L  -EM            User Key  (r) = Recorded By, (t) = Taken By, (c) = Cosigned By    Initials Name Provider Type    EM Yadiel Guy, RYAN Physical Therapy Assistant               OP Exercises     Row Name 11/16/21 0800             Subjective Comments    Subjective Comments Pt reports her R shoulder is worse than her L  -EM              Subjective Pain    Able to rate subjective pain? yes  -EM      Pre-Treatment Pain Level 4  -EM              Exercise 1    Exercise Name 1 PRO II-AAROM-1.0  -EM      Additional Comments 10'  -EM              Exercise 2    Exercise Name 2 B 3 Way Shoulder Pulley  -EM      Sets 2 1  -EM      Reps 2 20  -EM              Exercise 3    Exercise Name 3 B Sup AAROM: Flex(above and below painful arc)  -EM      Sets 3 1  -EM      Reps 3 20  -EM              Exercise 4    Exercise Name 4 B Seated AAROM with Wand  -EM      Sets 4 1  -EM      Reps 4 20  -EM              Exercise 5    Exercise Name 5 B Seated IR/ER with Wand  -EM      Sets 5 1  -EM      Reps 5 20  -EM              Exercise 6    Exercise Name 6 B Isometrics into ball: IR, ER  -EM      Sets 6 1  -EM      Reps 6 20  -EM      Time 6 3\"  Hold  -EM            User Key  (r) = Recorded By, (t) = Taken By, (c) = Cosigned By    Initials Name Provider Type    EM Yadiel Guy, RYAN Physical Therapy Assistant                              PT OP Goals     Row Name 11/16/21 0800          PT Short Term Goals    STG Date to Achieve 12/01/21  -EM     STG 1 Patient indep with HEP  -EM     STG 1 Progress Not Met  -EM     STG 2 Improve B shoulder abduction/IR AROM to 130° and 75°  -EM     STG 2 Progress Not Met  -EM     STG 3 Improve B shoulder abduction MMT to 4+/5 or better  -EM     STG 3 Progress Not Met  -EM     STG 4 Reduce B shoulder pain by 75% with overhead " reaching and pushing/pulling tasks  -EM     STG 4 Progress Not Met  -EM            Time Calculation    PT Goal Re-Cert Due Date 12/01/21  -EM           User Key  (r) = Recorded By, (t) = Taken By, (c) = Cosigned By    Initials Name Provider Type    EM Yadiel Guy, PTA Physical Therapy Assistant                Therapy Education  Education Details: Shoulder pulley system issued for HEP. When ice and MHP is indicated.  Given: HEP, Symptoms/condition management  Program: Progressed  How Provided: Verbal, Demonstration, Written  Provided to: Patient  Level of Understanding: Verbalized, Demonstrated              Time Calculation:   Start Time: 0800  Stop Time: 0847  Time Calculation (min): 47 min  Total Timed Code Minutes- PT: 47 minute(s)  Therapy Charges for Today     Code Description Service Date Service Provider Modifiers Qty    58639466611 HC PT THER PROC EA 15 MIN 11/16/2021 Yadiel Guy, PTA GP 3                    Yadiel Guy PTA  11/16/2021

## 2021-11-18 ENCOUNTER — HOSPITAL ENCOUNTER (OUTPATIENT)
Dept: PHYSICAL THERAPY | Facility: HOSPITAL | Age: 76
Setting detail: THERAPIES SERIES
Discharge: HOME | End: 2021-11-18

## 2021-11-18 DIAGNOSIS — M25.512 BILATERAL SHOULDER PAIN, UNSPECIFIED CHRONICITY: Primary | ICD-10-CM

## 2021-11-18 DIAGNOSIS — M25.511 BILATERAL SHOULDER PAIN, UNSPECIFIED CHRONICITY: Primary | ICD-10-CM

## 2021-11-18 PROCEDURE — 97110 THERAPEUTIC EXERCISES: CPT

## 2021-11-18 NOTE — THERAPY TREATMENT NOTE
Outpatient Physical Therapy Ortho Treatment Note  [facility]     Patient Name: Robert Larios  : 1945  MRN: [id number]  Today's Date: 2021      Visit Date: 2021   Attendance: 3/3  Subjective improvement: 30%  Recert: 21  MD Appointment: TBD      Visit Dx:    ICD-10-CM ICD-9-CM   1. Bilateral shoulder pain, unspecified chronicity  M25.511 719.41    M25.512        Patient Active Problem List   Diagnosis   • Hyperlipidemia   • GERD (gastroesophageal reflux disease)   • Essential hypertension   • Degenerative joint disease involving multiple joints   • Constipation   • Nuclear cataract   • Episcleritis   • Corneal ulcer, marginal   • Palpitation   • Abnormal EKG   • Dizziness   • Precordial pain   • Osteoporosis   • Irritable bowel syndrome        Past Medical History:   Diagnosis Date   • Acute otitis media 09/10/2015   • Acute pharyngitis 09/10/2015   • Acute sinusitis 09/10/2015   • Biliary colic 10/17/2014    STONES BY REVIEW US   • Breast cyst    • Change in bowel habits 2016   • Chest rales     MEDIUM   • Cholelithiasis without obstruction 10/17/2014   • Chronic cholecystitis 10/17/2014   • Constipation 2015   • Degenerative joint disease involving multiple joints 09/10/2014   • Elevated blood-pressure reading without diagnosis of hypertension 2012    BLOOD PRESSURE NORMAL AT HOME   • Epigastric pain 09/10/2014   • Essential hypertension 2015   • Fibrocystic breast    • Gastritis 2015   • GERD (gastroesophageal reflux disease) 2015   • Hammer toe 2015   • History of colonoscopy 2014    Information taken from [facility]    • Hyperlipidemia 2015   • Ingrowing nail [id number]   • Ingrown toenail    • Irritable bowel disease    • Pain in right foot 2015   • Peripheral neuropathy 09/10/2014   • Plantar fasciitis         Past Surgical History:   Procedure Laterality Date   • BREAST BIOPSY  2004    R mammotome  "biop w/ image guided placement of metallic localization clip & stereotactic location & guidance for breast biop. radiologic examination of the surgical specimen & unilateral mammography   • BREAST CYST ASPIRATION     • CHOLECYSTECTOMY  10/09/2014   • COLONOSCOPY  08/28/2014    Hemorrhoids found.   • COLONOSCOPY N/A 12/6/2018    Procedure: COLONOSCOPY;  Surgeon: Everardo Beltran DO;  Location: [facility] ENDOSCOPY;  Service: Gastroenterology   • DILATATION AND CURETTAGE  08/28/2014    Postmenopausal bleeding   • ENDOSCOPY  08/28/2014    Normal hypopharynx, esophagus. A hiatus hernia found in the GE junction.Non-erosive gastritis found inthe stomach. Multiple biopsies taken.Moderate amount of bile sained fluid in the stomach.Normal, symmetrical, and patent pylorus.Normal duodenum.   • INJECTION OF MEDICATION  07/30/2015    KENALOG(1)   • TONSILLECTOMY          PT Ortho     Row Name 11/18/21 0900       Subjective Comments    Subjective Comments Pt reports improved mobility in her shoulders \"I could get my jacket on much easier.\" Pt c/o L UT pain-likely due to compensation with scap elevation  -EM       Precautions and Contraindications    Precautions/Limitations no known precautions/limitations  -EM          User Key  (r) = Recorded By, (t) = Taken By, (c) = Cosigned By    Initials Name Provider Type    EM Yadiel Guy, PTA Physical Therapy Assistant                             PT Assessment/Plan     Row Name 11/18/21 0900          PT Assessment    Functional Limitations Performance in work activities; Performance in sport activities; Performance in self-care ADL; Performance in leisure activities; Limitation in home management  -EM     Impairments Impaired flexibility; Muscle strength; Pain; Range of motion  -EM     Assessment Comments Pt leslie tx well with good leslie to initiation of AROm therex and progressed AAROM therex. Pt is progressing well with ability to dress and reports improved mobility. Pt states she is 30% " "improved.  -EM     Rehab Potential Good  -EM     Patient/caregiver participated in establishment of treatment plan and goals Yes  -EM     Patient would benefit from skilled therapy intervention Yes  -EM            PT Plan    PT Frequency 2x/week  -EM     Predicted Duration of Therapy Intervention (PT) 3 weeks then further TBD  -EM     PT Plan Comments Add STM/TPR to UT as pt c/o pain likely due to compensation with scap elevation. Cont gentle progression with AROM therex.  -EM           User Key  (r) = Recorded By, (t) = Taken By, (c) = Cosigned By    Initials Name Provider Type    EM Yadiel Guy, RYAN Physical Therapy Assistant                   OP Exercises     Row Name 11/18/21 0900             Subjective Comments    Subjective Comments Pt reports improved mobility in her shoulders \"I could get my jacket on much easier.\" Pt c/o L UT pain-likely due to compensation with scap elevation  -EM              Subjective Pain    Able to rate subjective pain? yes  -EM      Pre-Treatment Pain Level 2  -EM      Post-Treatment Pain Level --  1.5  -EM              Exercise 1    Exercise Name 1 PRO II-AAROM-1.0  -EM      Time 1 10'  -EM              Exercise 2    Exercise Name 2 B 3 Way Shoulder Pulley  -EM      Sets 2 1  -EM      Reps 2 20  -EM              Exercise 3    Exercise Name 3 B Seated AAROM with Wand: Flex, Abd  -EM      Sets 3 1  -EM      Reps 3 20  -EM              Exercise 4    Exercise Name 4 B Shoulder TB: Ext, Add, IR  -EM      Sets 4 1  -EM      Reps 4 20  -EM      Additional Comments RTB  -EM              Exercise 5    Exercise Name 5 B Wall Taps  -EM      Sets 5 1  -EM      Reps 5 5 Over and Back  -EM            User Key  (r) = Recorded By, (t) = Taken By, (c) = Cosigned By    Initials Name Provider Type    EM Yadiel Guy, RYAN Physical Therapy Assistant                              PT OP Goals     Row Name 11/18/21 0900          PT Short Term Goals    STG Date to Achieve 12/01/21  -EM     STG 1 Patient " indep with HEP  -EM     STG 1 Progress Not Met  -EM     STG 2 Improve B shoulder abduction/IR AROM to 130° and 75°  -EM     STG 2 Progress Not Met  -EM     STG 3 Improve B shoulder abduction MMT to 4+/5 or better  -EM     STG 3 Progress Not Met  -EM     STG 4 Reduce B shoulder pain by 75% with overhead reaching and pushing/pulling tasks  -EM     STG 4 Progress Not Met  -EM            Time Calculation    PT Goal Re-Cert Due Date 12/01/21  -EM           User Key  (r) = Recorded By, (t) = Taken By, (c) = Cosigned By    Initials Name Provider Type    EM Yadiel Guy PTA Physical Therapy Assistant                               Time Calculation:   Start Time: 0847  Stop Time: 0929  Time Calculation (min): 42 min  Total Timed Code Minutes- PT: 42 minute(s)  Therapy Charges for Today     Code Description Service Date Service Provider Modifiers Qty    53018191350 HC PT THER PROC EA 15 MIN 11/18/2021 Yadiel Guy PTA GP 3                    Yadiel Guy PTA  11/18/2021

## 2021-11-23 ENCOUNTER — HOSPITAL ENCOUNTER (OUTPATIENT)
Dept: PHYSICAL THERAPY | Facility: HOSPITAL | Age: 76
Setting detail: THERAPIES SERIES
Discharge: HOME | End: 2021-11-23

## 2021-11-23 DIAGNOSIS — M25.512 BILATERAL SHOULDER PAIN, UNSPECIFIED CHRONICITY: Primary | ICD-10-CM

## 2021-11-23 DIAGNOSIS — M25.511 BILATERAL SHOULDER PAIN, UNSPECIFIED CHRONICITY: Primary | ICD-10-CM

## 2021-11-23 PROCEDURE — 97110 THERAPEUTIC EXERCISES: CPT

## 2021-11-23 NOTE — THERAPY TREATMENT NOTE
Outpatient Physical Therapy Ortho Treatment Note  [facility]     Patient Name: Robert Larios  : 1945  MRN: [id number]  Today's Date: 2021      Visit Date: 2021  Subjective Improvement: 30%  MD visit: JENNA  Visit Number:   Total Approved:Medicare  Recert Date: 2021  Visit Dx:    ICD-10-CM ICD-9-CM   1. Bilateral shoulder pain, unspecified chronicity  M25.511 719.41    M25.512        Patient Active Problem List   Diagnosis   • Hyperlipidemia   • GERD (gastroesophageal reflux disease)   • Essential hypertension   • Degenerative joint disease involving multiple joints   • Constipation   • Nuclear cataract   • Episcleritis   • Corneal ulcer, marginal   • Palpitation   • Abnormal EKG   • Dizziness   • Precordial pain   • Osteoporosis   • Irritable bowel syndrome        Past Medical History:   Diagnosis Date   • Acute otitis media 09/10/2015   • Acute pharyngitis 09/10/2015   • Acute sinusitis 09/10/2015   • Biliary colic 10/17/2014    STONES BY REVIEW US   • Breast cyst    • Change in bowel habits 2016   • Chest rales [id number]    MEDIUM   • Cholelithiasis without obstruction 10/17/2014   • Chronic cholecystitis 10/17/2014   • Constipation 2015   • Degenerative joint disease involving multiple joints 09/10/2014   • Elevated blood-pressure reading without diagnosis of hypertension 2012    BLOOD PRESSURE NORMAL AT HOME   • Epigastric pain 09/10/2014   • Essential hypertension 2015   • Fibrocystic breast    • Gastritis 2015   • GERD (gastroesophageal reflux disease) 2015   • Hammer toe 2015   • History of colonoscopy 2014    Information taken from [facility]    • Hyperlipidemia 2015   • Ingrowing nail [id number]   • Ingrown toenail    • Irritable bowel disease    • Pain in right foot 2015   • Peripheral neuropathy 09/10/2014   • Plantar fasciitis         Past Surgical History:   Procedure Laterality Date   • BREAST BIOPSY   11/22/2004    R mammotome biop w/ image guided placement of metallic localization clip & stereotactic location & guidance for breast biop. radiologic examination of the surgical specimen & unilateral mammography   • BREAST CYST ASPIRATION     • CHOLECYSTECTOMY  10/09/2014   • COLONOSCOPY  08/28/2014    Hemorrhoids found.   • COLONOSCOPY N/A 12/6/2018    Procedure: COLONOSCOPY;  Surgeon: Everardo Beltran DO;  Location: [facility] ENDOSCOPY;  Service: Gastroenterology   • DILATATION AND CURETTAGE  08/28/2014    Postmenopausal bleeding   • ENDOSCOPY  08/28/2014    Normal hypopharynx, esophagus. A hiatus hernia found in the GE junction.Non-erosive gastritis found inthe stomach. Multiple biopsies taken.Moderate amount of bile sained fluid in the stomach.Normal, symmetrical, and patent pylorus.Normal duodenum.   • INJECTION OF MEDICATION  07/30/2015    KENALOG(1)   • TONSILLECTOMY          PT Ortho     Row Name 11/23/21 0800       Subjective Comments    Subjective Comments pt reports that she reads alot.  -TL       Precautions and Contraindications    Precautions/Limitations no known precautions/limitations  -TL       Subjective Pain    Able to rate subjective pain? yes  -TL    Pre-Treatment Pain Level 3  -TL    Post-Treatment Pain Level 2  -TL       Posture/Observations    Posture/Observations Comments fwd head and shld. Pt kyphotic posturing with upper c-spine and thoracic area.  -TL          User Key  (r) = Recorded By, (t) = Taken By, (c) = Cosigned By    Initials Name Provider Type    TL Shoshana Moore PTA Physical Therapy Assistant                             PT Assessment/Plan     Row Name 11/23/21 0900          PT Assessment    Assessment Comments Pt has several trigger points in left upper trap. Left being worse than right. Pt tolerated pincer and thumb techigue this date. PTA was able to release few trigger points but not all.  -TL            PT Plan    PT Frequency 2x/week  -TL     Predicted Duration of  Therapy Intervention (PT) 3 weeks then to be determined.  -TL     PT Plan Comments Continue STM upper trap along with postural strengthen ex UE.  -TL           User Key  (r) = Recorded By, (t) = Taken By, (c) = Cosigned By    Initials Name Provider Type    Shoshana Yao PTA Physical Therapy Assistant                 Modalities     Row Name 11/23/21 0900             Moist Heat    MH Applied Yes  -TL      Location upper trap with estim  -TL      MH S/P Rx Yes  -TL              ELECTRICAL STIMULATION    Attended/Unattended Unattended  -TL      Stimulation Type IFC  -TL      Location/Electrode Placement/Other upper trap with moist heat  -TL      PT E-Stim Unattended Minutes 15  -TL            User Key  (r) = Recorded By, (t) = Taken By, (c) = Cosigned By    Initials Name Provider Type    Shoshana Yao PTA Physical Therapy Assistant               OP Exercises     Row Name 11/23/21 0900 11/23/21 0800          Subjective Comments    Subjective Comments -- pt reports that she reads alot.  -TL            Subjective Pain    Able to rate subjective pain? -- yes  -TL     Pre-Treatment Pain Level -- 3  -TL     Post-Treatment Pain Level -- 2  -TL            Total Minutes    22521 - PT Therapeutic Exercise Minutes 41  -TL --     69633 - PT Manual Therapy Minutes 15  -TL --            Exercise 1    Exercise Name 1 -- Pro ll arom UE  -TL     Time 1 -- 10 mins  -TL     Additional Comments -- level 1 both UE  -TL            Exercise 2    Exercise Name 2 -- 3- way shld pulleys  -TL     Time 2 -- 2 mins each directions  -TL            Exercise 3    Exercise Name 3 -- shld chest pull with chin tuck  -TL     Sets 3 -- 4  -TL     Reps 3 -- 5 reps  -TL     Additional Comments -- YTB  -TL            Exercise 4    Exercise Name 4 -- full cans 3-way  -TL     Reps 4 -- 10 each directions  -TL            Exercise 5    Exercise Name 5 -- see manual  -TL            Exercise 6    Exercise Name 6 -- upper trap S  -TL     Sets 6 -- 2   -TL     Time 6 -- 30 sec hold  -TL           User Key  (r) = Recorded By, (t) = Taken By, (c) = Cosigned By    Initials Name Provider Type    Shoshana Yao PTA Physical Therapy Assistant                         Manual Rx (last 36 hours)     Manual Treatments     Row Name 11/23/21 0900             Total Minutes    42315 - PT Manual Therapy Minutes 15  -TL              Manual Rx 1    Manual Rx 1 Location upper trap STM  -TL      Manual Rx 1 Type thumb opposite direction, pincer,  -TL      Manual Rx 1 Duration 10  -TL              Manual Rx 2    Manual Rx 2 Location arcom c-spine with tissue lengthening  -TL      Manual Rx 2 Duration 5 mins  -TL            User Key  (r) = Recorded By, (t) = Taken By, (c) = Cosigned By    Initials Name Provider Type    hSoshana Yao PTA Physical Therapy Assistant                 PT OP Goals     Row Name 11/23/21 0900          PT Short Term Goals    STG Date to Achieve 12/01/21  -TL     STG 1 Patient indep with HEP  -TL     STG 1 Progress Not Met  -TL     STG 2 Improve B shoulder abduction/IR AROM to 130° and 75°  -TL     STG 2 Progress Not Met  -TL     STG 3 Improve B shoulder abduction MMT to 4+/5 or better  -TL     STG 3 Progress Not Met  -TL     STG 4 Reduce B shoulder pain by 75% with overhead reaching and pushing/pulling tasks  -TL     STG 4 Progress Not Met  -TL           User Key  (r) = Recorded By, (t) = Taken By, (c) = Cosigned By    Initials Name Provider Type    Shoshana Yao PTA Physical Therapy Assistant                               Time Calculation:   Start Time: 0845  Stop Time: 0956  Time Calculation (min): 71 min  Timed Charges  68110 - PT Therapeutic Exercise Minutes: 41  84703 - PT Manual Therapy Minutes: 15  Untimed Charges  PT E-Stim Unattended Minutes: 15  Total Minutes  Timed Charges Total Minutes: 56  Untimed Charges Total Minutes: 15   Total Minutes: 71                Shoshana Moore PTA  11/23/2021

## 2021-11-30 ENCOUNTER — HOSPITAL ENCOUNTER (OUTPATIENT)
Dept: PHYSICAL THERAPY | Facility: HOSPITAL | Age: 76
Setting detail: THERAPIES SERIES
Discharge: HOME | End: 2021-11-30

## 2021-11-30 DIAGNOSIS — G89.29 CHRONIC SI JOINT PAIN: ICD-10-CM

## 2021-11-30 DIAGNOSIS — M53.3 CHRONIC SI JOINT PAIN: ICD-10-CM

## 2021-11-30 DIAGNOSIS — M25.511 BILATERAL SHOULDER PAIN, UNSPECIFIED CHRONICITY: Primary | ICD-10-CM

## 2021-11-30 DIAGNOSIS — M25.512 BILATERAL SHOULDER PAIN, UNSPECIFIED CHRONICITY: Primary | ICD-10-CM

## 2021-11-30 PROCEDURE — 97140 MANUAL THERAPY 1/> REGIONS: CPT

## 2021-11-30 PROCEDURE — 97110 THERAPEUTIC EXERCISES: CPT

## 2021-12-02 ENCOUNTER — HOSPITAL ENCOUNTER (OUTPATIENT)
Dept: PHYSICAL THERAPY | Facility: HOSPITAL | Age: 76
Setting detail: THERAPIES SERIES
Discharge: HOME | End: 2021-12-02

## 2021-12-02 DIAGNOSIS — G89.29 CHRONIC SI JOINT PAIN: ICD-10-CM

## 2021-12-02 DIAGNOSIS — M53.3 CHRONIC SI JOINT PAIN: ICD-10-CM

## 2021-12-02 DIAGNOSIS — M25.512 BILATERAL SHOULDER PAIN, UNSPECIFIED CHRONICITY: Primary | ICD-10-CM

## 2021-12-02 DIAGNOSIS — M25.511 BILATERAL SHOULDER PAIN, UNSPECIFIED CHRONICITY: Primary | ICD-10-CM

## 2021-12-02 PROCEDURE — 97110 THERAPEUTIC EXERCISES: CPT

## 2021-12-02 PROCEDURE — 97140 MANUAL THERAPY 1/> REGIONS: CPT

## 2021-12-02 NOTE — THERAPY TREATMENT NOTE
Outpatient Physical Therapy Ortho Treatment Note  [facility]     Patient Name: Robert Larios  : 1945  MRN: [id number]  Today's Date: 2021      Visit Date: 2021   Attendance:   Subjective improvement: 30%  Recert: 21  MD Appointment: TBD      Visit Dx:    ICD-10-CM ICD-9-CM   1. Bilateral shoulder pain, unspecified chronicity  M25.511 719.41    M25.512    2. Chronic SI joint pain  M53.3 724.6    G89.29 338.29       Patient Active Problem List   Diagnosis   • Hyperlipidemia   • GERD (gastroesophageal reflux disease)   • Essential hypertension   • Degenerative joint disease involving multiple joints   • Constipation   • Nuclear cataract   • Episcleritis   • Corneal ulcer, marginal   • Palpitation   • Abnormal EKG   • Dizziness   • Precordial pain   • Osteoporosis   • Irritable bowel syndrome        Past Medical History:   Diagnosis Date   • Acute otitis media 09/10/2015   • Acute pharyngitis 09/10/2015   • Acute sinusitis 09/10/2015   • Biliary colic 10/17/2014    STONES BY REVIEW US   • Breast cyst    • Change in bowel habits 2016   • Chest rales 5    MEDIUM   • Cholelithiasis without obstruction 10/17/2014   • Chronic cholecystitis 10/17/2014   • Constipation 2015   • Degenerative joint disease involving multiple joints 09/10/2014   • Elevated blood-pressure reading without diagnosis of hypertension 2012    BLOOD PRESSURE NORMAL AT HOME   • Epigastric pain 09/10/2014   • Essential hypertension 2015   • Fibrocystic breast    • Gastritis 2015   • GERD (gastroesophageal reflux disease) 2015   • Hammer toe 2015   • History of colonoscopy 2014    Information [facility]    • Hyperlipidemia 2015   • Ingrowing nail [id number]   • Ingrown toenail    • Irritable bowel disease    • Pain in right foot 2015   • Peripheral neuropathy 09/10/2014   • Plantar fasciitis         Past Surgical History:   Procedure  "Laterality Date   • BREAST BIOPSY  11/22/2004    R mammotome biop w/ image guided placement of metallic localization clip & stereotactic location & guidance for breast biop. radiologic examination of the surgical specimen & unilateral mammography   • BREAST CYST ASPIRATION     • CHOLECYSTECTOMY  10/09/2014   • COLONOSCOPY  08/28/2014    Hemorrhoids found.   • COLONOSCOPY N/A 12/6/2018    Procedure: COLONOSCOPY;  Surgeon: Everardo Beltran DO;  Location: [facility] ENDOSCOPY;  Service: Gastroenterology   • DILATATION AND CURETTAGE  08/28/2014    Postmenopausal bleeding   • ENDOSCOPY  08/28/2014    Normal hypopharynx, esophagus. A hiatus hernia found in the GE junction.Non-erosive gastritis found inthe stomach. Multiple biopsies taken.Moderate amount of bile sained fluid in the stomach.Normal, symmetrical, and patent pylorus.Normal duodenum.   • INJECTION OF MEDICATION  07/30/2015    KENALOG(1)   • TONSILLECTOMY          PT Ortho     Row Name 12/02/21 0800       Precautions and Contraindications    Precautions/Limitations no known precautions/limitations  -EM          User Key  (r) = Recorded By, (t) = Taken By, (c) = Cosigned By    Initials Name Provider Type    Yadiel Quiros PTA Physical Therapy Assistant                             PT Assessment/Plan     Row Name 12/02/21 0900          PT Assessment    Assessment Comments Significant L UT tightness noted with multiple trigger points noted-significantly improved post manual.  -EM            PT Plan    PT Frequency 2x/week  -EM     Predicted Duration of Therapy Intervention (PT) 3 weeks then TBD  -EM     PT Plan Comments ROM measurements next tx.  -EM           User Key  (r) = Recorded By, (t) = Taken By, (c) = Cosigned By    Initials Name Provider Type    Yadiel Quiros PTA Physical Therapy Assistant                   OP Exercises     Row Name 12/02/21 0800             Subjective Comments    Subjective Comments Pt states she is somewhat better. \"I can reach " "without feeling the pulling in my arm. However, I still cant open a can. Im not as sore in the morning when I wake up\" \"I still have troubles lifting my arms to wash my armpits and when driving while turning.\"  -EM              Subjective Pain    Able to rate subjective pain? yes  -EM      Pre-Treatment Pain Level 0  -EM      Post-Treatment Pain Level 2  -EM      Subjective Pain Comment R UT  -EM              Exercise 1    Exercise Name 1 PRO II-[place]-4.0  -EM      Time 1 10'  -EM              Exercise 2    Exercise Name 2 B IR Strap S  -EM      Sets 2 3  -EM      Time 2 30\"  -EM              Exercise 3    Exercise Name 3 STM/TPR  -EM      Time 3 12'  -EM              Exercise 4    Exercise Name 4 B Shoulder TB: Ext, Add, IR  -EM      Sets 4 1  -EM      Reps 4 20  -EM      Additional Comments Red  -EM              Exercise 5    Exercise Name 5 B Shoulder TB: Flex  -EM      Sets 5 2  -EM      Reps 5 10  -EM      Additional Comments YTB  -EM            User Key  (r) = Recorded By, (t) = Taken By, (c) = Cosigned By    Initials Name Provider Type    EM Yadiel Guy, RYAN Physical Therapy Assistant                         Manual Rx (last 36 hours)     Manual Treatments     Row Name 12/02/21 0900             Manual Rx 1    Manual Rx 1 Location B upper trap, LS, Semispinalis capitis  -EM      Manual Rx 1 Type TPR, gliding  -EM      Manual Rx 1 Duration 12'  -EM            User Key  (r) = Recorded By, (t) = Taken By, (c) = Cosigned By    Initials Name Provider Type    EM Yadiel Guy PTA Physical Therapy Assistant                 PT OP Goals     Row Name 12/02/21 0900          PT Short Term Goals    STG Date to Achieve 12/01/21  -EM     STG 1 Patient indep with HEP  -EM     STG 1 Progress Ongoing; Progressing  -EM     STG 2 Improve B shoulder abduction/IR AROM to 130° and 75°  -EM     STG 2 Progress Ongoing; Progressing  -EM     STG 3 Improve B shoulder abduction MMT to 4+/5 or better  -EM     STG 3 Progress Not " Met; Ongoing; Progressing  -EM     STG 4 Reduce B shoulder pain by 75% with overhead reaching and pushing/pulling tasks  -EM     STG 4 Progress Not Met; Ongoing  -EM            Time Calculation    PT Goal Re-Cert Due Date 12/01/21  -EM           User Key  (r) = Recorded By, (t) = Taken By, (c) = Cosigned By    Initials Name Provider Type    EM Yadiel Guy, RYAN Physical Therapy Assistant                               Time Calculation:   Start Time: 0848  Stop Time: 0938  Time Calculation (min): 50 min  Total Timed Code Minutes- PT: 50 minute(s)  Therapy Charges for Today     Code Description Service Date Service Provider Modifiers Qty    78429891663 HC PT MANUAL THERAPY EA 15 MIN 12/2/2021 Yadiel Guy, PTA GP 1    11384655857 HC PT THER PROC EA 15 MIN 12/2/2021 Yaidel Guy PTA GP 2                    Yadiel Guy PTA  12/2/2021

## 2021-12-06 ENCOUNTER — HOSPITAL ENCOUNTER (OUTPATIENT)
Dept: PHYSICAL THERAPY | Facility: HOSPITAL | Age: 76
Setting detail: THERAPIES SERIES
Discharge: HOME | End: 2021-12-06

## 2021-12-06 DIAGNOSIS — M25.511 BILATERAL SHOULDER PAIN, UNSPECIFIED CHRONICITY: Primary | ICD-10-CM

## 2021-12-06 DIAGNOSIS — M25.512 BILATERAL SHOULDER PAIN, UNSPECIFIED CHRONICITY: Primary | ICD-10-CM

## 2021-12-06 PROCEDURE — 97110 THERAPEUTIC EXERCISES: CPT | Performed by: PHYSICAL THERAPIST

## 2021-12-06 NOTE — THERAPY PROGRESS REPORT/RE-CERT
Outpatient Physical Therapy Ortho Progress Note  [facility]     Patient Name: Robert Larios  : 1945  MRN: [id number]  Today's Date: 2021      Visit Date: 2021  Attendance:   Subjective improvement: 30%  Recert: 21  MD Appointment: TBD    Visit Dx:    ICD-10-CM ICD-9-CM   1. Bilateral shoulder pain, unspecified chronicity  M25.511 719.41    M25.512        Patient Active Problem List   Diagnosis   • Hyperlipidemia   • GERD (gastroesophageal reflux disease)   • Essential hypertension   • Degenerative joint disease involving multiple joints   • Constipation   • Nuclear cataract   • Episcleritis   • Corneal ulcer, marginal   • Palpitation   • Abnormal EKG   • Dizziness   • Precordial pain   • Osteoporosis   • Irritable bowel syndrome        Past Medical History:   Diagnosis Date   • Acute otitis media 09/10/2015   • Acute pharyngitis 09/10/2015   • Acute sinusitis 09/10/2015   • Biliary colic 10/17/2014    STONES BY REVIEW US   • Breast cyst    • Change in bowel habits 2016   • Chest rales     MEDIUM   • Cholelithiasis without obstruction 10/17/2014   • Chronic cholecystitis 10/17/2014   • Constipation 2015   • Degenerative joint disease involving multiple joints 09/10/2014   • Elevated blood-pressure reading without diagnosis of hypertension 2012    BLOOD PRESSURE NORMAL AT HOME   • Epigastric pain 09/10/2014   • Essential hypertension 2015   • Fibrocystic breast    • Gastritis 2015   • GERD (gastroesophageal reflux disease) 2015   • Hammer toe 2015   • History of colonoscopy 2014    Information taken from [facility]    • Hyperlipidemia 2015   • Ingrowing nail [id number]   • Ingrown toenail    • Irritable bowel disease    • Pain in right foot 2015   • Peripheral neuropathy 09/10/2014   • Plantar fasciitis         Past Surgical History:   Procedure Laterality Date   • BREAST BIOPSY  2004    R mammotome biop w/  image guided placement of metallic localization clip & stereotactic location & guidance for breast biop. radiologic examination of the surgical specimen & unilateral mammography   • BREAST CYST ASPIRATION     • CHOLECYSTECTOMY  10/09/2014   • COLONOSCOPY  08/28/2014    Hemorrhoids found.   • COLONOSCOPY N/A 12/6/2018    Procedure: COLONOSCOPY;  Surgeon: Everardo Beltran DO;  Location: [facility] ENDOSCOPY;  Service: Gastroenterology   • DILATATION AND CURETTAGE  08/28/2014    Postmenopausal bleeding   • ENDOSCOPY  08/28/2014    Normal hypopharynx, esophagus. A hiatus hernia found in the GE junction.Non-erosive gastritis found inthe stomach. Multiple biopsies taken.Moderate amount of bile sained fluid in the stomach.Normal, symmetrical, and patent pylorus.Normal duodenum.   • INJECTION OF MEDICATION  07/30/2015    KENALOG(1)   • TONSILLECTOMY          PT Ortho     Row Name 12/06/21 0800       Subjective Comments    Subjective Comments 30-40% improvement. Most trouble with reaching across her body with the R>L shoulder including applying deodorant action.  -BS       Precautions and Contraindications    Precautions/Limitations no known precautions/limitations  -BS       Subjective Pain    Able to rate subjective pain? yes  -BS    Pre-Treatment Pain Level 2  -BS    Post-Treatment Pain Level 2  -BS       General ROM    GENERAL ROM COMMENTS AROM: R shoulder abd 95 L shoulder abd 112 IR-R 50 L 53  -BS          User Key  (r) = Recorded By, (t) = Taken By, (c) = Cosigned By    Initials Name Provider Type    Sander Bridges, PT Physical Therapist                             PT Assessment/Plan     Row Name 12/06/21 0800          PT Assessment    Assessment Comments Progressing slowly toward goals. Limited by pain and shoulder weakness.  -BS            PT Plan    PT Frequency 2x/week  -BS     Predicted Duration of Therapy Intervention (PT) additional 2-3 weeks  -BS     PT Plan Comments push shoulder strengthening: resisted  "scaption w/ TB, countertop push ups.  -BS           User Key  (r) = Recorded By, (t) = Taken By, (c) = Cosigned By    Initials Name Provider Type    Sander Bridges, PT Physical Therapist                   OP Exercises     Row Name 12/06/21 0800             Subjective Comments    Subjective Comments 30-40% improvement. Most trouble with reaching across her body with the R>L shoulder including applying deodorant action.  -BS              Subjective Pain    Able to rate subjective pain? yes  -BS      Pre-Treatment Pain Level 2  -BS      Post-Treatment Pain Level 2  -BS              Exercise 1    Exercise Name 1 PRO II-Twin Peak-4.0  -BS      Time 1 10'  -BS              Exercise 2    Exercise Name 2 PROM to B shoulders in supine  -BS      Time 2 5'  -BS              Exercise 3    Exercise Name 3 ROM/MMT reassessment  -BS      Time 3 2'  -BS              Exercise 4    Exercise Name 4 SL sleeper S, B  -BS      Sets 4 1  -BS      Reps 4 20  -BS      Time 4 2\" hold  -BS              Exercise 5    Exercise Name 5 wall pushups  -BS      Sets 5 2  -BS      Reps 5 20  -BS              Exercise 6    Exercise Name 6 chair push ups  -BS      Sets 6 1  -BS      Reps 6 10  -BS              Exercise 7    Exercise Name 7 standing B UT S  -BS      Sets 7 1  -BS      Reps 7 2  -BS      Time 7 30\" hold  -BS            User Key  (r) = Recorded By, (t) = Taken By, (c) = Cosigned By    Initials Name Provider Type    Sander Bridges, PT Physical Therapist                              PT OP Goals     Row Name 12/06/21 0800          PT Short Term Goals    STG Date to Achieve 12/27/21  -BS     STG 1 Patient indep with HEP  -BS     STG 1 Progress Ongoing; Progressing  -BS     STG 2 Improve B shoulder abduction/IR AROM to 130° and 75°  -BS     STG 2 Progress Ongoing; Progressing  -BS     STG 3 Improve B shoulder abduction MMT to 4+/5 or better  -BS     STG 3 Progress Progressing; Partially Met  shoulder abd-L 5/5 R 4/5  -BS     STG 4 Reduce " B shoulder pain by 75% with overhead reaching and pushing/pulling tasks  -BS     STG 4 Progress Not Met; Ongoing  60%  -BS            Time Calculation    PT Goal Re-Cert Due Date 12/27/21  -BS           User Key  (r) = Recorded By, (t) = Taken By, (c) = Cosigned By    Initials Name Provider Type    BS Sander Villalta, PT Physical Therapist                     Outcome Measure Options: Quick DASH  Quick DASH  Open a tight or new jar.: No Difficulty  Do heavy household chores (e.g., wash walls, wash floors): Mild Difficulty  Carry a shopping bag or briefcase: Mild Difficulty  Wash your back: Severe Difficulty  Use a knife to cut food: No Difficulty  Recreational activities in which you take some force or impact through your arm, should or hand (e.g. golf, hammering, tennis, etc.): No Difficulty  During the past week, to what extent has your arm, shoulder, or hand problem interfered with your normal social activites with family, friends, neighbors or groups?: Slightly  During the past week, were you limited in your work or other regular daily activities as a result of your arm, shoulder or hand problem?: Not limited at all  Arm, Shoulder, or hand pain: Mild  Tingling (pins and needles) in your arm, shoulder, or hand: None  During the past week, how much difficulty have you had sleeping because of the pain in your arm, shoulder or hand?: Moderate Difficiculty  Number of Questions Answered: 11  Quick DASH Score: 20.45         Time Calculation:   Start Time: 0802  Stop Time: 0847  Time Calculation (min): 45 min  Total Timed Code Minutes- PT: 45 minute(s)  Therapy Charges for Today     Code Description Service Date Service Provider Modifiers Qty    03606330947 HC PT THER PROC EA 15 MIN 12/6/2021 Sander Villalta, PT GP 3          PT G-Codes  Outcome Measure Options: Quick DASH  Quick DASH Score: 20.45         Sander Villalta PT  12/6/2021

## 2021-12-09 ENCOUNTER — HOSPITAL ENCOUNTER (OUTPATIENT)
Dept: PHYSICAL THERAPY | Facility: HOSPITAL | Age: 76
Setting detail: THERAPIES SERIES
Discharge: HOME | End: 2021-12-09

## 2021-12-09 DIAGNOSIS — M53.3 CHRONIC SI JOINT PAIN: ICD-10-CM

## 2021-12-09 DIAGNOSIS — G89.29 CHRONIC SI JOINT PAIN: ICD-10-CM

## 2021-12-09 DIAGNOSIS — M25.512 BILATERAL SHOULDER PAIN, UNSPECIFIED CHRONICITY: Primary | ICD-10-CM

## 2021-12-09 DIAGNOSIS — M25.511 BILATERAL SHOULDER PAIN, UNSPECIFIED CHRONICITY: Primary | ICD-10-CM

## 2021-12-09 PROCEDURE — 97110 THERAPEUTIC EXERCISES: CPT

## 2021-12-09 NOTE — THERAPY TREATMENT NOTE
Outpatient Physical Therapy Ortho Treatment Note  [facility]     Patient Name: Robert Larios  : 1945  MRN: [id number]  Today's Date: 2021      Visit Date: 2021  Subjective Improvement: 50%  MD visit: JENNA  Visit Number:   Total Approved:Medicare  Recert Date: 21  Visit Dx:    ICD-10-CM ICD-9-CM   1. Bilateral shoulder pain, unspecified chronicity  M25.511 719.41    M25.512    2. Chronic SI joint pain  M53.3 724.6    G89.29 338.29       Patient Active Problem List   Diagnosis   • Hyperlipidemia   • GERD (gastroesophageal reflux disease)   • Essential hypertension   • Degenerative joint disease involving multiple joints   • Constipation   • Nuclear cataract   • Episcleritis   • Corneal ulcer, marginal   • Palpitation   • Abnormal EKG   • Dizziness   • Precordial pain   • Osteoporosis   • Irritable bowel syndrome        Past Medical History:   Diagnosis Date   • Acute otitis media 09/10/2015   • Acute pharyngitis 09/10/2015   • Acute sinusitis 09/10/2015   • Biliary colic 10/17/2014    STONES BY REVIEW US   • Breast cyst    • Change in bowel habits 2016   • Chest rales [id number]    MEDIUM   • Cholelithiasis without obstruction 10/17/2014   • Chronic cholecystitis 10/17/2014   • Constipation 2015   • Degenerative joint disease involving multiple joints 09/10/2014   • Elevated blood-pressure reading without diagnosis of hypertension 2012    BLOOD PRESSURE NORMAL AT HOME   • Epigastric pain 09/10/2014   • Essential hypertension 2015   • Fibrocystic breast    • Gastritis 2015   • GERD (gastroesophageal reflux disease) 2015   • Hammer toe 2015   • History of colonoscopy 2014    Information taken from [facility]    • Hyperlipidemia 2015   • Ingrowing nail [id number]   • Ingrown toenail    • Irritable bowel disease    • Pain in right foot 2015   • Peripheral neuropathy 09/10/2014   • Plantar fasciitis         Past Surgical  History:   Procedure Laterality Date   • BREAST BIOPSY  11/22/2004    R mammotome biop w/ image guided placement of metallic localization clip & stereotactic location & guidance for breast biop. radiologic examination of the surgical specimen & unilateral mammography   • BREAST CYST ASPIRATION     • CHOLECYSTECTOMY  10/09/2014   • COLONOSCOPY  08/28/2014    Hemorrhoids found.   • COLONOSCOPY N/A 12/6/2018    Procedure: COLONOSCOPY;  Surgeon: Everardo Beltran DO;  Location: [facility] ENDOSCOPY;  Service: Gastroenterology   • DILATATION AND CURETTAGE  08/28/2014    Postmenopausal bleeding   • ENDOSCOPY  08/28/2014    Normal hypopharynx, esophagus. A hiatus hernia found in the GE junction.Non-erosive gastritis found inthe stomach. Multiple biopsies taken.Moderate amount of bile sained fluid in the stomach.Normal, symmetrical, and patent pylorus.Normal duodenum.   • INJECTION OF MEDICATION  07/30/2015    KENALOG(1)   • TONSILLECTOMY          PT Ortho     Row Name 12/09/21 1300       Subjective Comments    Subjective Comments Pt reports that she is at least 50% improved.  -TL       Precautions and Contraindications    Precautions/Limitations no known precautions/limitations  -TL       Subjective Pain    Able to rate subjective pain? yes  -TL    Pre-Treatment Pain Level 1  -TL          User Key  (r) = Recorded By, (t) = Taken By, (c) = Cosigned By    Initials Name Provider Type    TL Shoshana Moore PTA Physical Therapy Assistant                             PT Assessment/Plan     Row Name 12/09/21 1300          PT Assessment    Assessment Comments Pt tolerated more resist using GTB for scap. Pt toleated open book ex. Pt  reports 50% improvement.  -TL            PT Plan    PT Frequency 2x/week  -TL     Predicted Duration of Therapy Intervention (PT) 2-3 additional weeks  -TL     PT Plan Comments check motion next visit. add ball squeeze OH lifts both UE  -TL           User Key  (r) = Recorded By, (t) = Taken By, (c) =  Cosigned By    Initials Name Provider Type    TL Shoshana Moore PTA Physical Therapy Assistant                   OP Exercises     Row Name 12/09/21 1300             Subjective Comments    Subjective Comments Pt reports that she is at least 50% improved.  -TL              Subjective Pain    Able to rate subjective pain? yes  -TL      Pre-Treatment Pain Level 1  -TL              Total Minutes    31839 - PT Therapeutic Exercise Minutes 41  -TL              Exercise 1    Exercise Name 1 PRO II-Twin Peak-4.0  -TL      Time 1 5 fwd/5 back  -TL              Exercise 2    Exercise Name 2 doorway S  -TL      Sets 2 3  -TL      Time 2 30  -TL              Exercise 3    Exercise Name 3 shld rows mid/high  -TL      Sets 3 2  -TL      Reps 3 10 each  -TL      Additional Comments GTB  -TL              Exercise 4    Exercise Name 4 shld rows lat pull down  -TL      Sets 4 2  -TL      Reps 4 10  -TL      Additional Comments GTB  -TL              Exercise 5    Exercise Name 5 counter top push ups  -TL      Sets 5 2  -TL      Reps 5 10  -TL              Exercise 6    Exercise Name 6 open books S  -TL      Sets 6 1  -TL      Reps 6 10  -TL      Additional Comments both  -TL              Exercise 7    Exercise Name 7 wall walks with GTB  -TL      Time 7 2 mins  -TL              Exercise 8    Exercise Name 8 upper trap  -TL      Sets 8 2  -TL      Time 8 30 sec hold  -TL            User Key  (r) = Recorded By, (t) = Taken By, (c) = Cosigned By    Initials Name Provider Type    TL Shoshana Moore PTA Physical Therapy Assistant                              PT OP Goals     Row Name 12/09/21 1300          PT Short Term Goals    STG Date to Achieve 12/27/21  -TL     STG 1 Patient indep with HEP  -TL     STG 1 Progress Ongoing; Progressing  -TL     STG 2 Improve B shoulder abduction/IR AROM to 130° and 75°  -TL     STG 2 Progress Ongoing; Progressing  -TL     STG 3 Improve B shoulder abduction MMT to 4+/5 or better  -TL     STG 3  Progress Progressing; Partially Met  shoulder abd-L 5/5 R 4/5  -TL     STG 4 Reduce B shoulder pain by 75% with overhead reaching and pushing/pulling tasks  -TL     STG 4 Progress Not Met; Ongoing  60%  -TL            Time Calculation    PT Goal Re-Cert Due Date 12/27/21  -TL           User Key  (r) = Recorded By, (t) = Taken By, (c) = Cosigned By    Initials Name Provider Type    Shoshana Yao PTA Physical Therapy Assistant                               Time Calculation:   Start Time: 1345  Stop Time: 1426  Time Calculation (min): 41 min  Timed Charges  45565 - PT Therapeutic Exercise Minutes: 41  Total Minutes  Timed Charges Total Minutes: 41   Total Minutes: 41  Therapy Charges for Today     Code Description Service Date Service Provider Modifiers Qty    [id number]  PT THER PROC EA 15 MIN 12/9/2021 Shoshana Moore PTA GP 3                    Shoshana Moore PTA  12/9/2021

## 2021-12-13 ENCOUNTER — APPOINTMENT (OUTPATIENT)
Dept: PHYSICAL THERAPY | Facility: HOSPITAL | Age: 76
End: 2021-12-13

## 2021-12-14 ENCOUNTER — HOSPITAL ENCOUNTER (OUTPATIENT)
Dept: PHYSICAL THERAPY | Facility: HOSPITAL | Age: 76
Setting detail: THERAPIES SERIES
Discharge: HOME | End: 2021-12-14

## 2021-12-14 DIAGNOSIS — M25.511 BILATERAL SHOULDER PAIN, UNSPECIFIED CHRONICITY: Primary | ICD-10-CM

## 2021-12-14 DIAGNOSIS — M25.512 BILATERAL SHOULDER PAIN, UNSPECIFIED CHRONICITY: Primary | ICD-10-CM

## 2021-12-14 PROCEDURE — 97110 THERAPEUTIC EXERCISES: CPT

## 2021-12-14 PROCEDURE — 97140 MANUAL THERAPY 1/> REGIONS: CPT

## 2021-12-14 NOTE — THERAPY TREATMENT NOTE
Outpatient Physical Therapy Ortho Treatment Note  [facility]     Patient Name: Robert Larios  : 1945  MRN: [id number]  Today's Date: 2021      Visit Date: 2021    Attendance:     per Medicare guidelines  Subjective Improvement:   50%  MD Appt:   TBD  Recheck Due:   21    Visit Dx:    ICD-10-CM ICD-9-CM   1. Bilateral shoulder pain, unspecified chronicity  M25.511 719.41    M25.512        Patient Active Problem List   Diagnosis   • Hyperlipidemia   • GERD (gastroesophageal reflux disease)   • Essential hypertension   • Degenerative joint disease involving multiple joints   • Constipation   • Nuclear cataract   • Episcleritis   • Corneal ulcer, marginal   • Palpitation   • Abnormal EKG   • Dizziness   • Precordial pain   • Osteoporosis   • Irritable bowel syndrome        Past Medical History:   Diagnosis Date   • Acute otitis media 09/10/2015   • Acute pharyngitis 09/10/2015   • Acute sinusitis 09/10/2015   • Biliary colic 10/17/2014    STONES BY REVIEW US   • Breast cyst    • Change in bowel habits 2016   • Chest rales     MEDIUM   • Cholelithiasis without obstruction 10/17/2014   • Chronic cholecystitis 10/17/2014   • Constipation 2015   • Degenerative joint disease involving multiple joints 09/10/2014   • Elevated blood-pressure reading without diagnosis of hypertension 2012    BLOOD PRESSURE NORMAL AT HOME   • Epigastric pain 09/10/2014   • Essential hypertension 2015   • Fibrocystic breast    • Gastritis 2015   • GERD (gastroesophageal reflux disease) 2015   • Hammer toe 2015   • History of colonoscopy 2014    Information taken from [facility]    • Hyperlipidemia 2015   • Ingrowing nail [id number]   • Ingrown toenail    • Irritable bowel disease    • Pain in right foot 2015   • Peripheral neuropathy 09/10/2014   • Plantar fasciitis         Past Surgical History:   Procedure Laterality Date   • BREAST BIOPSY   11/22/2004    R mammotome biop w/ image guided placement of metallic localization clip & stereotactic location & guidance for breast biop. radiologic examination of the surgical specimen & unilateral mammography   • BREAST CYST ASPIRATION     • CHOLECYSTECTOMY  10/09/2014   • COLONOSCOPY  08/28/2014    Hemorrhoids found.   • COLONOSCOPY N/A 12/6/2018    Procedure: COLONOSCOPY;  Surgeon: Everardo Beltran DO;  Location: [facility] ENDOSCOPY;  Service: Gastroenterology   • DILATATION AND CURETTAGE  08/28/2014    Postmenopausal bleeding   • ENDOSCOPY  08/28/2014    Normal hypopharynx, esophagus. A hiatus hernia found in the GE junction.Non-erosive gastritis found inthe stomach. Multiple biopsies taken.Moderate amount of bile sained fluid in the stomach.Normal, symmetrical, and patent pylorus.Normal duodenum.   • INJECTION OF MEDICATION  07/30/2015    KENALOG(1)   • TONSILLECTOMY          PT Ortho     Row Name 12/14/21 0800       Subjective Comments    Subjective Comments Reports having pain down posterior UEs.  -TM       Precautions and Contraindications    Precautions/Limitations no known precautions/limitations  -TM       Subjective Pain    Able to rate subjective pain? yes  -TM    Pre-Treatment Pain Level 2  -TM    Post-Treatment Pain Level 2  -TM       General ROM    GENERAL ROM COMMENTS AROM R shoulder flex 139°; Abd 125°/ L shoulder flex 139°; Abd 125°  -TM          User Key  (r) = Recorded By, (t) = Taken By, (c) = Cosigned By    Initials Name Provider Type    Pretty Monroe PTA Physical Therapy Assistant                             PT Assessment/Plan     Row Name 12/14/21 0800          PT Assessment    Assessment Comments Pt reports some UT pain with T Band resisted exercises.  Responds well to manual treatment.  Good increase in B shoulder ROM.  -TM            PT Plan    PT Frequency 2x/week  -TM     Predicted Duration of Therapy Intervention (PT) 2-3 additional weeks  -TM     PT Plan Comments AROM  "shoulder flex, Abd to 90° possibly with 1# if tolerated.  -TM           User Key  (r) = Recorded By, (t) = Taken By, (c) = Cosigned By    Initials Name Provider Type    TM Pretty Cohen PTA Physical Therapy Assistant                 Modalities     Row Name 12/14/21 0900             Ice    Patient denies application of Ice Yes  -TM      Patient reports will apply ice at home to involved area Yes  -TM            User Key  (r) = Recorded By, (t) = Taken By, (c) = Cosigned By    Initials Name Provider Type     Pretty Cohen PTA Physical Therapy Assistant               OP Exercises     Row Name 12/14/21 0800             Subjective Comments    Subjective Comments Reports having pain down posterior UEs.  -TM              Subjective Pain    Able to rate subjective pain? yes  -TM      Pre-Treatment Pain Level 2  -TM      Post-Treatment Pain Level 2  -TM              Exercise 1    Exercise Name 1 PRO II L 4.5  -TM      Time 1 5' fwd/5'bwd  -TM              Exercise 2    Exercise Name 2 doorway S  -TM      Reps 2 3  -TM      Time 2 30\"  -TM              Exercise 3    Exercise Name 3 shld rows mid/high  -TM      Sets 3 2  -TM      Reps 3 10  -TM      Additional Comments green  -TM              Exercise 4    Exercise Name 4 shld rows lat pull down  -TM      Sets 4 2  -TM      Reps 4 10  -TM      Additional Comments green  -TM              Exercise 5    Exercise Name 5 counter top push ups  -TM      Sets 5 2  -TM      Reps 5 10  -TM              Exercise 6    Exercise Name 6 wall walk with GTB  -TM      Time 6 2 min  -TM              Exercise 7    Exercise Name 7 T Band horiz Abd  -TM      Sets 7 2  -TM      Reps 7 10  -TM      Time 7 --  -TM      Additional Comments green  -TM              Exercise 8    Exercise Name 8 SL open books  -TM      Sets 8 2  -TM      Reps 8 10  -TM              Exercise 9    Exercise Name 9 supine shoulder flexion w/ball squeeze  -TM      Sets 9 2  -TM      Reps 9 10  -TM            " User Key  (r) = Recorded By, (t) = Taken By, (c) = Cosigned By    Initials Name Provider Type    TM Pretty Cohen PTA Physical Therapy Assistant                         Manual Rx (last 36 hours)     Manual Treatments     Row Name 12/14/21 0700             Manual Rx 1    Manual Rx 1 Location B upper trap, LS, Semispinalis capitis  -TM      Manual Rx 1 Type STM/MFR  -TM            User Key  (r) = Recorded By, (t) = Taken By, (c) = Cosigned By    Initials Name Provider Type    TM Pretty Cohen PTA Physical Therapy Assistant                 PT OP Goals     Row Name 12/14/21 0800          PT Short Term Goals    STG Date to Achieve 12/27/21  -TM     STG 1 Patient indep with HEP  -TM     STG 1 Progress Ongoing; Progressing  -TM     STG 2 Improve B shoulder abduction/IR AROM to 130° and 75°  -TM     STG 2 Progress Ongoing; Progressing  -TM     STG 3 Improve B shoulder abduction MMT to 4+/5 or better  -TM     STG 3 Progress Progressing; Partially Met  shoulder abd-L 5/5 R 4/5  -TM     STG 4 Reduce B shoulder pain by 75% with overhead reaching and pushing/pulling tasks  -TM     STG 4 Progress Not Met; Ongoing  60%  -TM           User Key  (r) = Recorded By, (t) = Taken By, (c) = Cosigned By    Initials Name Provider Type    TM Pretty Cohen PTA Physical Therapy Assistant                               Time Calculation:   Start Time: 0805  Stop Time: 0850  Time Calculation (min): 45 min  Total Timed Code Minutes- PT: 45 minute(s)  Therapy Charges for Today     Code Description Service Date Service Provider Modifiers Qty    96129546902 HC PT MANUAL THERAPY EA 15 MIN 12/14/2021 Pretty Cohen, PTA GP 1    14825445938 HC PT THER PROC EA 15 MIN 12/14/2021 Pretty Cohen PTA GP 2                    Pretty Cohen PTA  12/14/2021

## 2021-12-16 ENCOUNTER — HOSPITAL ENCOUNTER (OUTPATIENT)
Dept: PHYSICAL THERAPY | Facility: HOSPITAL | Age: 76
Setting detail: THERAPIES SERIES
Discharge: HOME | End: 2021-12-16

## 2021-12-16 DIAGNOSIS — M25.512 BILATERAL SHOULDER PAIN, UNSPECIFIED CHRONICITY: Primary | ICD-10-CM

## 2021-12-16 DIAGNOSIS — M25.511 BILATERAL SHOULDER PAIN, UNSPECIFIED CHRONICITY: Primary | ICD-10-CM

## 2021-12-16 PROCEDURE — 97110 THERAPEUTIC EXERCISES: CPT

## 2021-12-16 NOTE — THERAPY TREATMENT NOTE
Outpatient Physical Therapy Ortho Treatment Note  [facility]     Patient Name: Robert Larios  : 1945  MRN: [id number]  Today's Date: 2021      Visit Date: 2021     Subjective Improvement 50-70%  Visits 10/10  Visits approved med nec  RTMD 2022  Recert Date 2021    Bilateral shoulder pain    Visit Dx:    ICD-10-CM ICD-9-CM   1. Bilateral shoulder pain, unspecified chronicity  M25.511 719.41    M25.512        Patient Active Problem List   Diagnosis   • Hyperlipidemia   • GERD (gastroesophageal reflux disease)   • Essential hypertension   • Degenerative joint disease involving multiple joints   • Constipation   • Nuclear cataract   • Episcleritis   • Corneal ulcer, marginal   • Palpitation   • Abnormal EKG   • Dizziness   • Precordial pain   • Osteoporosis   • Irritable bowel syndrome        Past Medical History:   Diagnosis Date   • Acute otitis media 09/10/2015   • Acute pharyngitis 09/10/2015   • Acute sinusitis 09/10/2015   • Biliary colic 10/17/2014    STONES BY REVIEW US   • Breast cyst    • Change in bowel habits 2016   • Chest rales [id number]    MEDIUM   • Cholelithiasis without obstruction 10/17/2014   • Chronic cholecystitis 10/17/2014   • Constipation 2015   • Degenerative joint disease involving multiple joints 09/10/2014   • Elevated blood-pressure reading without diagnosis of hypertension 2012    BLOOD PRESSURE NORMAL AT HOME   • Epigastric pain 09/10/2014   • Essential hypertension 2015   • Fibrocystic breast    • Gastritis 2015   • GERD (gastroesophageal reflux disease) 2015   • Hammer toe 2015   • History of colonoscopy 2014    Information taken from [facility]    • Hyperlipidemia 2015   • Ingrowing nail [id number]   • Ingrown toenail    • Irritable bowel disease    • Pain in right foot 2015   • Peripheral neuropathy 09/10/2014   • Plantar fasciitis         Past Surgical History:   Procedure Laterality  Date   • BREAST BIOPSY  11/22/2004    R mammotome biop w/ image guided placement of metallic localization clip & stereotactic location & guidance for breast biop. radiologic examination of the surgical specimen & unilateral mammography   • BREAST CYST ASPIRATION     • CHOLECYSTECTOMY  10/09/2014   • COLONOSCOPY  08/28/2014    Hemorrhoids found.   • COLONOSCOPY N/A 12/6/2018    Procedure: COLONOSCOPY;  Surgeon: Everardo Beltran DO;  Location: [facility] ENDOSCOPY;  Service: Gastroenterology   • DILATATION AND CURETTAGE  08/28/2014    Postmenopausal bleeding   • ENDOSCOPY  08/28/2014    Normal hypopharynx, esophagus. A hiatus hernia found in the GE junction.Non-erosive gastritis found inthe stomach. Multiple biopsies taken.Moderate amount of bile sained fluid in the stomach.Normal, symmetrical, and patent pylorus.Normal duodenum.   • INJECTION OF MEDICATION  07/30/2015    KENALOG(1)   • TONSILLECTOMY          PT Ortho     Row Name 12/16/21 0800       Subjective Comments    Subjective Comments Patient states that she is unable to sleep on either side because of b shoulder pain.  Reachng out and behind her back does increase her pain  -CP       Subjective Pain    Able to rate subjective pain? yes  -CP    Pre-Treatment Pain Level 2  -CP          User Key  (r) = Recorded By, (t) = Taken By, (c) = Cosigned By    Initials Name Provider Type    CP Lita Anguiano, PTA Physical Therapy Assistant                             PT Assessment/Plan     Row Name 12/16/21 0911          PT Assessment    Assessment Comments Patient voiced no improvement with ADL's  Patient was recommended to contact her referring MD since she doesnt have a return visit until .  Pain at end range of B FL and AB.  Tightness noted with PROM IR  -CP            PT Plan    PT Frequency 2x/week  -CP     Predicted Duration of Therapy Intervention (PT) 2-3 weeks  -CP     PT Plan Comments Cont with POC.  Take AROM measurements next visit  -CP            User Key  (r) = Recorded By, (t) = Taken By, (c) = Cosigned By    Initials Name Provider Type    CP Lita Anguiano, RYAN Physical Therapy Assistant                   OP Exercises     Row Name 12/16/21 0913 12/16/21 0800          Subjective Comments    Subjective Comments -- Patient states that she is unable to sleep on either side because of b shoulder pain.  Reachng out and behind her back does increase her pain  -CP            Subjective Pain    Able to rate subjective pain? -- yes  -CP     Pre-Treatment Pain Level -- 2  -CP     Post-Treatment Pain Level -- --  achy  -CP            Total Minutes    56552 - PT Therapeutic Exercise Minutes 42  -CP --            Exercise 1    Exercise Name 1 -- Pro II level 1  -CP     Time 1 -- 10  -CP     Additional Comments -- FW/BW  -CP            Exercise 2    Exercise Name 2 -- pullies fl and scap  -CP     Cueing 2 -- Verbal  -CP     Reps 2 -- 20  -CP            Exercise 3    Exercise Name 3 -- PROM B shoulder  -CP     Time 3 -- 8  -CP            Exercise 4    Exercise Name 4 -- supine shoulder circles  -CP     Cueing 4 -- Verbal; Demo; Tactile  -CP     Sets 4 -- 2  -CP     Reps 4 -- 10  -CP     Time 4 -- B UE  -CP     Additional Comments -- CW/CCW  -CP            Exercise 5    Exercise Name 5 -- supine chest press with weighted cane  -CP     Cueing 5 -- Verbal; Demo  -CP     Sets 5 -- 2  -CP     Reps 5 -- 10  -CP     Time 5 -- 4 lb cane  -CP            Exercise 6    Exercise Name 6 -- supine serratus punches  -CP     Cueing 6 -- Verbal; Demo; Tactile  -CP     Sets 6 -- 2  -CP     Reps 6 -- 10  -CP     Time 6 -- 1 lb DB  -CP            Exercise 7    Exercise Name 7 -- seated no money with tband  -CP     Cueing 7 -- Verbal; Demo  -CP     Sets 7 -- 2  -CP     Reps 7 -- 10  -CP           User Key  (r) = Recorded By, (t) = Taken By, (c) = Cosigned By    Initials Name Provider Type    Lita Tavares, RYAN Physical Therapy Assistant                              PT OP Goals      Row Name 12/16/21 0900          PT Short Term Goals    STG Date to Achieve 12/27/21  -CP     STG 1 Patient indep with HEP  -CP     STG 1 Progress Ongoing; Progressing  -CP     STG 2 Improve B shoulder abduction/IR AROM to 130° and 75°  -CP     STG 2 Progress Ongoing; Progressing  -CP     STG 3 Improve B shoulder abduction MMT to 4+/5 or better  -CP     STG 3 Progress Progressing; Partially Met  shoulder abd-L 5/5 R 4/5  -CP     STG 4 Reduce B shoulder pain by 75% with overhead reaching and pushing/pulling tasks  -CP     STG 4 Progress Not Met; Ongoing  60%  -CP            Time Calculation    PT Goal Re-Cert Due Date 12/27/21  -CP           User Key  (r) = Recorded By, (t) = Taken By, (c) = Cosigned By    Initials Name Provider Type    CP Lita Anguiano, PTA Physical Therapy Assistant                Therapy Education  Education Details: supine serratus punches, seated no money with tband  Given: HEP  Program: New  How Provided: Verbal, Demonstration, Written  Provided to: Patient  Level of Understanding: Teach back education performed, Verbalized, Demonstrated              Time Calculation:   Start Time: 0800  Stop Time: 0845  Time Calculation (min): 45 min  Total Timed Code Minutes- PT: 42 minute(s)  Timed Charges  67750 - PT Therapeutic Exercise Minutes: 42  Total Minutes  Timed Charges Total Minutes: 42   Total Minutes: 42  Therapy Charges for Today     Code Description Service Date Service Provider Modifiers Qty    01476280596 HC PT THER PROC EA 15 MIN 12/16/2021 Lita Anguiano PTA GP 3                    Lita Anguiano PTA  12/16/2021

## 2021-12-21 ENCOUNTER — APPOINTMENT (OUTPATIENT)
Dept: PHYSICAL THERAPY | Facility: HOSPITAL | Age: 76
End: 2021-12-21

## 2021-12-24 NOTE — PATIENT INSTRUCTIONS
Denosumab injection  What is this medicine?  DENOSUMAB (den oh marco a mab) slows bone breakdown. Prolia is used to treat osteoporosis in women after menopause and in men. Xgeva is used to prevent bone fractures and other bone problems caused by cancer bone metastases. Xgeva is also used to treat giant cell tumor of the bone.  This medicine may be used for other purposes; ask your health care provider or pharmacist if you have questions.  COMMON BRAND NAME(S): Prolia, XGEVA  What should I tell my health care provider before I take this medicine?  They need to know if you have any of these conditions:  -dental disease  -eczema  -infection or history of infections  -kidney disease or on dialysis  -low blood calcium or vitamin D  -malabsorption syndrome  -scheduled to have surgery or tooth extraction  -taking medicine that contains denosumab  -thyroid or parathyroid disease  -an unusual reaction to denosumab, other medicines, foods, dyes, or preservatives  -pregnant or trying to get pregnant  -breast-feeding  How should I use this medicine?  This medicine is for injection under the skin. It is given by a health care professional in a hospital or clinic setting.  If you are getting Prolia, a special MedGuide will be given to you by the pharmacist with each prescription and refill. Be sure to read this information carefully each time.  For Prolia, talk to your pediatrician regarding the use of this medicine in children. Special care may be needed. For Xgeva, talk to your pediatrician regarding the use of this medicine in children. While this drug may be prescribed for children as young as 13 years for selected conditions, precautions do apply.  Overdosage: If you think you have taken too much of this medicine contact a poison control center or emergency room at once.  NOTE: This medicine is only for you. Do not share this medicine with others.  What if I miss a dose?  It is important not to miss your dose. Call your doctor  or health care professional if you are unable to keep an appointment.  What may interact with this medicine?  Do not take this medicine with any of the following medications:  -other medicines containing denosumab  This medicine may also interact with the following medications:  -medicines that suppress the immune system  -medicines that treat cancer  -steroid medicines like prednisone or cortisone  This list may not describe all possible interactions. Give your health care provider a list of all the medicines, herbs, non-prescription drugs, or dietary supplements you use. Also tell them if you smoke, drink alcohol, or use illegal drugs. Some items may interact with your medicine.  What should I watch for while using this medicine?  Visit your doctor or health care professional for regular checks on your progress. Your doctor or health care professional may order blood tests and other tests to see how you are doing.  Call your doctor or health care professional if you get a cold or other infection while receiving this medicine. Do not treat yourself. This medicine may decrease your body's ability to fight infection.  You should make sure you get enough calcium and vitamin D while you are taking this medicine, unless your doctor tells you not to. Discuss the foods you eat and the vitamins you take with your health care professional.  See your dentist regularly. Brush and floss your teeth as directed. Before you have any dental work done, tell your dentist you are receiving this medicine.  Do not become pregnant while taking this medicine or for 5 months after stopping it. Women should inform their doctor if they wish to become pregnant or think they might be pregnant. There is a potential for serious side effects to an unborn child. Talk to your health care professional or pharmacist for more information.  What side effects may I notice from receiving this medicine?  Side effects that you should report to your doctor  or health care professional as soon as possible:  -allergic reactions like skin rash, itching or hives, swelling of the face, lips, or tongue  -breathing problems  -chest pain  -fast, irregular heartbeat  -feeling faint or lightheaded, falls  -fever, chills, or any other sign of infection  -muscle spasms, tightening, or twitches  -numbness or tingling  -skin blisters or bumps, or is dry, peels, or red  -slow healing or unexplained pain in the mouth or jaw  -unusual bleeding or bruising  Side effects that usually do not require medical attention (report to your doctor or health care professional if they continue or are bothersome):  -muscle pain  -stomach upset, gas  This list may not describe all possible side effects. Call your doctor for medical advice about side effects. You may report side effects to FDA at 3-423-FDA-2379.  Where should I keep my medicine?  This medicine is only given in a clinic, doctor's office, or other health care setting and will not be stored at home.  NOTE: This sheet is a summary. It may not cover all possible information. If you have questions about this medicine, talk to your doctor, pharmacist, or health care provider.     © 2017, [employer]. (2017-01-19 10:06:55)     Patient/Caregiver provided printed discharge information.

## 2021-12-28 ENCOUNTER — OFFICE VISIT (OUTPATIENT)
Dept: ORTHOPEDIC SURGERY | Facility: CLINIC | Age: 76
End: 2021-12-28

## 2021-12-28 VITALS — BODY MASS INDEX: 29.44 KG/M2 | HEIGHT: 62 IN | WEIGHT: 160 LBS

## 2021-12-28 DIAGNOSIS — G89.29 CHRONIC PAIN OF BOTH SHOULDERS: Primary | ICD-10-CM

## 2021-12-28 DIAGNOSIS — M75.102 ROTATOR CUFF SYNDROME OF BOTH SHOULDERS: ICD-10-CM

## 2021-12-28 DIAGNOSIS — M25.512 CHRONIC PAIN OF BOTH SHOULDERS: Primary | ICD-10-CM

## 2021-12-28 DIAGNOSIS — M53.3 CHRONIC RIGHT SI JOINT PAIN: ICD-10-CM

## 2021-12-28 DIAGNOSIS — M47.816 FACET ARTHROPATHY, LUMBAR: ICD-10-CM

## 2021-12-28 DIAGNOSIS — G89.29 CHRONIC RIGHT SI JOINT PAIN: ICD-10-CM

## 2021-12-28 DIAGNOSIS — M54.41 CHRONIC RIGHT-SIDED LOW BACK PAIN WITH RIGHT-SIDED SCIATICA: ICD-10-CM

## 2021-12-28 DIAGNOSIS — M25.511 CHRONIC PAIN OF BOTH SHOULDERS: Primary | ICD-10-CM

## 2021-12-28 DIAGNOSIS — M75.101 ROTATOR CUFF SYNDROME OF BOTH SHOULDERS: ICD-10-CM

## 2021-12-28 DIAGNOSIS — M51.36 DDD (DEGENERATIVE DISC DISEASE), LUMBAR: ICD-10-CM

## 2021-12-28 DIAGNOSIS — M15.9 PRIMARY OSTEOARTHRITIS INVOLVING MULTIPLE JOINTS: ICD-10-CM

## 2021-12-28 DIAGNOSIS — G89.29 CHRONIC RIGHT-SIDED LOW BACK PAIN WITH RIGHT-SIDED SCIATICA: ICD-10-CM

## 2021-12-28 PROCEDURE — 99215 OFFICE O/P EST HI 40 MIN: CPT | Performed by: NURSE PRACTITIONER

## 2021-12-28 PROCEDURE — 20610 DRAIN/INJ JOINT/BURSA W/O US: CPT | Performed by: NURSE PRACTITIONER

## 2021-12-28 RX ORDER — MULTIPLE VITAMINS W/ MINERALS TAB 9MG-400MCG
1 TAB ORAL DAILY
COMMUNITY

## 2021-12-28 RX ORDER — VITAMIN B COMPLEX
CAPSULE ORAL DAILY
COMMUNITY

## 2021-12-28 RX ADMIN — LIDOCAINE HYDROCHLORIDE 2 ML: 10 INJECTION, SOLUTION INFILTRATION; PERINEURAL at 16:14

## 2021-12-28 RX ADMIN — TRIAMCINOLONE ACETONIDE 40 MG: 40 INJECTION, SUSPENSION INTRA-ARTICULAR; INTRAMUSCULAR at 16:14

## 2022-01-02 PROBLEM — G89.29 CHRONIC RIGHT SI JOINT PAIN: Status: ACTIVE | Noted: 2022-01-02

## 2022-01-02 PROBLEM — G89.29 CHRONIC PAIN OF BOTH SHOULDERS: Status: ACTIVE | Noted: 2022-01-02

## 2022-01-02 PROBLEM — M75.102 ROTATOR CUFF SYNDROME OF BOTH SHOULDERS: Status: ACTIVE | Noted: 2022-01-02

## 2022-01-02 PROBLEM — M25.512 CHRONIC PAIN OF BOTH SHOULDERS: Status: ACTIVE | Noted: 2022-01-02

## 2022-01-02 PROBLEM — M25.511 CHRONIC PAIN OF BOTH SHOULDERS: Status: ACTIVE | Noted: 2022-01-02

## 2022-01-02 PROBLEM — M51.36 DDD (DEGENERATIVE DISC DISEASE), LUMBAR: Status: ACTIVE | Noted: 2022-01-02

## 2022-01-02 PROBLEM — G89.29 CHRONIC RIGHT-SIDED LOW BACK PAIN WITH RIGHT-SIDED SCIATICA: Status: ACTIVE | Noted: 2022-01-02

## 2022-01-02 PROBLEM — M53.3 CHRONIC RIGHT SI JOINT PAIN: Status: ACTIVE | Noted: 2022-01-02

## 2022-01-02 PROBLEM — M54.41 CHRONIC RIGHT-SIDED LOW BACK PAIN WITH RIGHT-SIDED SCIATICA: Status: ACTIVE | Noted: 2022-01-02

## 2022-01-02 PROBLEM — M47.816 FACET ARTHROPATHY, LUMBAR: Status: ACTIVE | Noted: 2022-01-02

## 2022-01-02 PROBLEM — M75.101 ROTATOR CUFF SYNDROME OF BOTH SHOULDERS: Status: ACTIVE | Noted: 2022-01-02

## 2022-01-02 RX ORDER — LIDOCAINE HYDROCHLORIDE 10 MG/ML
2 INJECTION, SOLUTION INFILTRATION; PERINEURAL
Status: COMPLETED | OUTPATIENT
Start: 2021-12-28 | End: 2021-12-28

## 2022-01-02 RX ORDER — TRIAMCINOLONE ACETONIDE 40 MG/ML
40 INJECTION, SUSPENSION INTRA-ARTICULAR; INTRAMUSCULAR
Status: COMPLETED | OUTPATIENT
Start: 2021-12-28 | End: 2021-12-28

## 2022-01-14 ENCOUNTER — OFFICE VISIT (OUTPATIENT)
Dept: ORTHOPEDIC SURGERY | Facility: CLINIC | Age: 77
End: 2022-01-14

## 2022-01-14 VITALS — WEIGHT: 158 LBS | BODY MASS INDEX: 29.08 KG/M2 | HEIGHT: 62 IN

## 2022-01-14 DIAGNOSIS — M75.42 IMPINGEMENT SYNDROME OF LEFT SHOULDER: ICD-10-CM

## 2022-01-14 DIAGNOSIS — Q06.8 TETHERED CORD: ICD-10-CM

## 2022-01-14 DIAGNOSIS — M75.102 ROTATOR CUFF SYNDROME OF BOTH SHOULDERS: ICD-10-CM

## 2022-01-14 DIAGNOSIS — M25.512 CHRONIC PAIN OF BOTH SHOULDERS: Primary | ICD-10-CM

## 2022-01-14 DIAGNOSIS — M19.011 BILATERAL ACROMIOCLAVICULAR JOINT ARTHRITIS: ICD-10-CM

## 2022-01-14 DIAGNOSIS — M47.816 FACET ARTHROPATHY, LUMBAR: ICD-10-CM

## 2022-01-14 DIAGNOSIS — M53.3 CHRONIC RIGHT SI JOINT PAIN: ICD-10-CM

## 2022-01-14 DIAGNOSIS — M75.101 ROTATOR CUFF SYNDROME OF BOTH SHOULDERS: ICD-10-CM

## 2022-01-14 DIAGNOSIS — M19.012 BILATERAL ACROMIOCLAVICULAR JOINT ARTHRITIS: ICD-10-CM

## 2022-01-14 DIAGNOSIS — M25.511 CHRONIC PAIN OF BOTH SHOULDERS: Primary | ICD-10-CM

## 2022-01-14 DIAGNOSIS — M51.36 DDD (DEGENERATIVE DISC DISEASE), LUMBAR: ICD-10-CM

## 2022-01-14 DIAGNOSIS — G89.29 CHRONIC PAIN OF BOTH SHOULDERS: Primary | ICD-10-CM

## 2022-01-14 DIAGNOSIS — M15.9 PRIMARY OSTEOARTHRITIS INVOLVING MULTIPLE JOINTS: ICD-10-CM

## 2022-01-14 DIAGNOSIS — M75.52 SUBDELTOID BURSITIS OF LEFT SHOULDER JOINT: ICD-10-CM

## 2022-01-14 DIAGNOSIS — M54.41 CHRONIC RIGHT-SIDED LOW BACK PAIN WITH RIGHT-SIDED SCIATICA: ICD-10-CM

## 2022-01-14 DIAGNOSIS — G89.29 CHRONIC RIGHT-SIDED LOW BACK PAIN WITH RIGHT-SIDED SCIATICA: ICD-10-CM

## 2022-01-14 DIAGNOSIS — G89.29 CHRONIC RIGHT SI JOINT PAIN: ICD-10-CM

## 2022-01-14 DIAGNOSIS — M75.111 NONTRAUMATIC INCOMPLETE TEAR OF RIGHT ROTATOR CUFF: ICD-10-CM

## 2022-01-14 PROBLEM — M75.41 IMPINGEMENT SYNDROME OF RIGHT SHOULDER: Status: ACTIVE | Noted: 2022-01-14

## 2022-01-14 PROCEDURE — 99214 OFFICE O/P EST MOD 30 MIN: CPT | Performed by: NURSE PRACTITIONER

## 2022-01-14 NOTE — PROGRESS NOTES
"Robert Larios is a 76 y.o. female returns for     Chief Complaint   Patient presents with   • Left Shoulder - Pain, Follow-up   • Right Shoulder - Follow-up, Pain   • Lumbar Spine - Follow-up, Pain   • Results     mri done on 1/11/2022       HISTORY OF PRESENT ILLNESS: Patient presents to office for follow-up of chronic bilateral shoulder pain, chronic low back pain, chronic right SI joint pain and to discuss MRI results of bilateral shoulders and lumbar spine.  Patient has experienced chronic bilateral shoulder pain and chronic low back pain for several years that has continued to progressively worsen over time.  Patient has recently done physical therapy for her bilateral shoulder pain and attended 10/10 visits with 50 to 70% subjective improvement.  Patient reports that her bilateral shoulder pain has improved since last office visit after she was given subacromial injections of steroid into both shoulders on 12/28/2021.  Additionally, she has had a prior left shoulder injection several years ago.  Patient continues to complain of chronic low back pain and right SI joint pain.  She has pain that radiates down her right leg intermittently.  Patient has been under the care of a chiropractor for several years for her back pain, which offers some temporary improvement.  No new complaints or concerns noted since last office visit.  No falls or injuries reported since last office visit.  Pain scale currently 0/10.     CONCURRENT MEDICAL HISTORY:    The following portions of the patient's history were reviewed and updated as appropriate: allergies, current medications, past family history, past medical history, past social history, past surgical history and problem list.     ROS  No fevers or chills.  No chest pain or shortness of air.  No GI or  disturbances.  Low back pain.  Right leg pain.  Bilateral shoulder pain.  Multiple joint pain.    PHYSICAL EXAMINATION:       Ht 157.5 cm (62\")   Wt 71.7 kg (158 " lb)   BMI 28.90 kg/m²     Physical Exam  Vitals reviewed.   Constitutional:       General: She is not in acute distress.     Appearance: She is well-developed. She is not ill-appearing.   HENT:      Head: Normocephalic.   Pulmonary:      Effort: Pulmonary effort is normal. No respiratory distress.   Abdominal:      General: There is no distension.      Palpations: Abdomen is soft.   Musculoskeletal:         General: Tenderness (Mild, bilateral shoulders, lumbar spine, right SI joint) present. No swelling, deformity or signs of injury.      Lumbar back: Negative right straight leg raise test and negative left straight leg raise test.   Skin:     General: Skin is warm and dry.   Neurological:      Mental Status: She is alert and oriented to person, place, and time.      GCS: GCS eye subscore is 4. GCS verbal subscore is 5. GCS motor subscore is 6.   Psychiatric:         Speech: Speech normal.         Behavior: Behavior normal.         Thought Content: Thought content normal.         Judgment: Judgment normal.         GAIT:     []  Normal  [x]  Antalgic (mild)    Assistive device: [x]  None  []  Walker     []  Crutches  []  Cane     []  Wheelchair  []  Stretcher    Back Exam     Tenderness   The patient is experiencing tenderness in the lumbar and sacroiliac.    Range of Motion   Extension: abnormal   Flexion: abnormal   Rotation right: abnormal   Rotation left: abnormal     Muscle Strength   Right Quadriceps:  4/5   Left Quadriceps:  4/5     Tests   Straight leg raise right: negative  Straight leg raise left: negative    Other   Sensation: decreased  Gait: antalgic (Mild)   Erythema: no back redness    Comments:  No focal neurological deficits noted.      Right Shoulder Exam     Tenderness   The patient is experiencing tenderness in the acromioclavicular joint (Mild, diffuse).    Range of Motion   Active abduction: 120   Passive abduction: 150   Extension: 30   External rotation: 80   Forward flexion: 140   Internal  rotation 90 degrees: 70     Muscle Strength   Abduction: 4/5   Supraspinatus: 4/5     Tests   Ortega test: positive  Cross arm: positive  Impingement: positive  Drop arm: negative    Other   Erythema: absent  Sensation: normal  Pulse: present    Comments:  Empty can test positive.  Full can test positive.  No swelling appreciated.  Neurovascularly intact.      Left Shoulder Exam     Tenderness   The patient is experiencing tenderness in the acromioclavicular joint (Mild, diffuse).    Range of Motion   Active abduction: 130   Passive abduction: 150   Extension: 40   External rotation: 80   Forward flexion: 140   Internal rotation 90 degrees: 70     Muscle Strength   Abduction: 4/5   Supraspinatus: 4/5     Tests   Ortega test: positive  Cross arm: positive  Impingement: positive  Drop arm: negative    Other   Erythema: absent  Sensation: normal  Pulse: present     Comments:  Empty can test positive.  Full can test positive.  No swelling appreciated.  Neurovascularly intact.            Joaquin Mcgee MD - 01/11/2022   Formatting of this note might be different from the original.   MRI LUMBAR SPINE WITHOUT CONTRAST     Indication: Low back and bilateral leg pain.     Comparison: 2/15/2013.     Technique: Sagittal/axial T1/T2.     Findings: There is minimal L4-5 anterolisthesis secondary to bilateral facet arthropathy. Alignment is otherwise normal.     There is tethering of the cord, similar to previous. No lipoma of the filum is identified.     There is no lumbar disc herniation, foraminal stenosis or suspicious marrow signal. Paravertebral muscles are symmetric.     IMPRESSION:   Tethering of cord, also evident on 2013 exam. L4-5 facet arthropathy with grade 1 anterolisthesis      Joaquin Mcgee MD - 01/11/2022   Formatting of this note might be different from the original.   MRI LEFT SHOULDER WITHOUT CONTRAST     Indication: Shoulder pain.     Technique: 1.5 Rosey. Multiplanar sequences  without contrast.     Findings: Caudal osteophytes at the AC joint indents the supraspinatus musculotendinous junction, a potential source for impingement syndrome.     No rotator cuff tendon defect is identified, and cuff muscle bulk/signal are normal. There is a thin fluid collection in the subdeltoid bursa compatible with bursitis.     Series 901, image 2 demonstrates a 4 mm round, hypointense structure in the subcoracoid bursa consistent with a small loose body.     Humeral head morphology and signal are normal. There is abnormal marginal osteophytosis at the glenohumeral joint, with subchondral edema in the posterior aspect of the glenoid. No paralabral cyst or spinoglenoid notch mass is evident. The deltoid muscle    is normal.     IMPRESSION:     1. Glenohumeral and acromioclavicular joint arthritic changes.   2. Subdeltoid bursitis. Small loose body in the subcoracoid bursa.   3. No convincing evidence for rotator cuff tear.     Joaquin Mcgee MD - 01/11/2022   Formatting of this note might be different from the original.   MRI RIGHT SHOULDER WITHOUT CONTRAST     Indication: Shoulder pain.     Technique: 1.5 Rosey. Multiplanar sequences without contrast.     Findings: There is a thin subdeltoid bursal fluid collection. The humeral head is in extreme internal rotation.     Series 701, image 5 demonstrates a 4 mm fluid collection within the anterior/undersurface insertional supraspinatus tendon fibers consistent with focal partial-thickness tearing.     Rotator cuff muscles are of normal signal intensity and bulk. The deltoid muscle is normal. No humeral head marrow edema is perceived. Mild arthritic changes are noted at the posterior aspect of the glenoid. There are no caudal osteophytes at the AC   joint.     IMPRESSION:     1. Partial-thickness undersurface tear anterior insertional supraspinatus tendon fibers.   2. Degenerative changes as described.    PROCEDURE: Lumbar spine 5 views     REASON  FOR EXAM: abnormal xray, M54.40 Lumbago with sciatica,  unspecified side     FINDINGS: Mild lumbar spine curvature convex right which may be  positional versus secondary to mild dextroscoliosis. Otherwise  lumbar spine vertebral body heights and alignment are normal.  There is no evidence of fracture or dislocation.Intervertebral  disc spaces are intact.     IMPRESSION:  1. Mild lumbar spine curvature convex right which may be  positional versus secondary to mild dextroscoliosis.  2. No acute lumbar spine abnormality..     Electronically signed by:  Sander Andres MD  1/3/2020 4:47 PM CST  Workstation: [id number]     PROCEDURE:  Bilateral  Shoulder 3 views     REASON FOR EXAM:  pain, M25.511 Pain in right shoulder G89.29  Other chronic pain M25.512 Pain in left shoulder     FINDINGS: Right shoulder bony structures are normal. There is no  evidence of fracture or dislocation. Soft tissue structures are  normal.     Left shoulder bony structures are normal. There is no evidence of  fracture or dislocation. Soft tissue structures are normal.        IMPRESSION:  Negative bilateral shoulders.     Electronically signed by:  Sander Andres MD  11/3/2021 4:04 PM CDT  Workstation: [id number]    ASSESSMENT:    Diagnoses and all orders for this visit:    Chronic pain of both shoulders  -     Ambulatory Referral to Physical Therapy Evaluate and treat; (as indicated); Stretching, ROM, Strengthening    Rotator cuff syndrome of both shoulders  -     Ambulatory Referral to Physical Therapy Evaluate and treat; (as indicated); Stretching, ROM, Strengthening    Chronic right-sided low back pain with right-sided sciatica  -     Ambulatory Referral to Neurosurgery    DDD (degenerative disc disease), lumbar  -     Ambulatory Referral to Neurosurgery    Tethered cord (HCC)  -     Ambulatory Referral to Neurosurgery    Chronic right SI joint pain  -     Ambulatory Referral to Neurosurgery    Primary osteoarthritis involving multiple  joints    Facet arthropathy, lumbar  -     Ambulatory Referral to Neurosurgery    Impingement syndrome of left shoulder  -     Ambulatory Referral to Physical Therapy Evaluate and treat; (as indicated); Stretching, ROM, Strengthening    Subdeltoid bursitis of left shoulder joint  -     Ambulatory Referral to Physical Therapy Evaluate and treat; (as indicated); Stretching, ROM, Strengthening    Nontraumatic incomplete tear of right rotator cuff  -     Ambulatory Referral to Physical Therapy Evaluate and treat; (as indicated); Stretching, ROM, Strengthening    Bilateral acromioclavicular joint arthritis  -     Ambulatory Referral to Physical Therapy Evaluate and treat; (as indicated); Stretching, ROM, Strengthening    PLAN    MRI of lumbar spine reviewed and results are discussed with patient today.  We discussed evidence of a mild anterolisthesis at L4-L5 secondary to bilateral facet arthropathy.  We discussed evidence of tethering of the spinal cord, which the radiologist indicates appears similar to previous imaging on 2013 exam.  The patient has experienced chronic low back pain and chronic right SI joint pain for several years.  We discussed the most common symptoms with tethered cord including low back pain, sacral pain and urinary symptoms.  Patient does report that she has experienced urinary issues for many years.  Of course, we discussed that other conditions can contribute to urinary symptoms.  I have recommended a referral to neurosurgery for further evaluation based on evidence of tethered cord on MRI and her progressively worsening chronic low back pain and right SI joint pain.  Patient is in agreement with this.  She understands that she will receive a telephone call to schedule this referral appointment in the near future.  I have instructed the patient to take a copy of her MRI images with her to the appointment.    Recommend the following for low back pain:     -Rest and activity modification for now  with avoidance of straining, lifting, pulling, tugging, etc.  -Use of ice therapy and/or heat therapy to the low back to minimize pain/inflammation/muscle tension.  -Use of a cane or walker for modified weightbearing from the lumbar spine.    MRI of the left shoulder reviewed and results are discussed with patient today.  We discussed evidence of AC joint arthropathy as well as some osteoarthritic changes at the glenohumeral joint.  We discussed evidence of subdeltoid bursitis and a small loose body in the subcoracoid bursa.  No evidence of rotator cuff tear is identified.  MRI of the right shoulder is reviewed and results are discussed with patient today.  We discussed evidence of mild degenerative changes of the glenohumeral joint and a partial-thickness undersurface tear of the supraspinatus tendon.  No evidence of any full-thickness rotator cuff tear.  Patient reports that her shoulder pain has improved since last office visit after receiving subacromial injections of steroid into both shoulders on 12/28/2021.  We discussed continued conservative treatment efforts.  We also discussed eventual surgical consult if her pain persists.  I have recommended a referral to physical therapy for improved range of motion and conditioning/strengthening exercises of her bilateral shoulders.  Patient is in agreement with this.  A referral is faxed to [facility].  The patient can also continue with chiropractic care if this offers her any improvement.    Recommend the following for shoulder pain:      -Rest and activity modification with avoidance of heavy lifting, pulling, tugging and repetitive motions for now.  -Gentle, progressive range of motion exercises with the affected shoulder intermittently several times daily as pain allows. Continue with home exercises for conditioning/strengthening that she previously learned in PT.   -Ice therapy to the affected shoulder intermittently 3-4 times daily for 15 minutes at a time  to minimize pain/inflammation.  -Tylenol as needed for pain/discomfort.  The patient states she does take occasional OTC anti-inflammatory medications but overall she avoids oral NSAIDs as they exacerbate her irritable bowel syndrome.  For this reason, we will avoid prescription oral NSAIDs.  -Gradual progression of activity and use of the affected arm/shoulder as tolerated and based on pain/symptoms.    Follow-up in 6 weeks for recheck.    Time spent of a minimum of 30 minutes including the face to face evaluation, reviewing of medical history and prior medial records, reviewing of diagnostic studies, ordering additional treatments, specialty referral, documentation, patient education and coordination of care.     EMR Dragon/Transciption Disclaimer: Some of this note may be an electronic transcription/translation of spoken language to printed text.  The electronic translation of spoken language may permit erroneous, or at times, nonsensical words or phrases to be inadvertently transcribed. Although I have reviewed the note for such errors, some may still exist.     Return in about 6 weeks (around 2/25/2022) for Recheck.        This document has been electronically signed by TEOFILO Flaherty on January 19, 2022 16:03 CST      TEOFILO Flaherty

## 2022-02-25 ENCOUNTER — OFFICE VISIT (OUTPATIENT)
Dept: ORTHOPEDIC SURGERY | Facility: CLINIC | Age: 77
End: 2022-02-25

## 2022-02-25 VITALS — WEIGHT: 158 LBS | HEIGHT: 62 IN | BODY MASS INDEX: 29.08 KG/M2

## 2022-02-25 DIAGNOSIS — M75.102 ROTATOR CUFF SYNDROME OF BOTH SHOULDERS: ICD-10-CM

## 2022-02-25 DIAGNOSIS — M19.012 BILATERAL ACROMIOCLAVICULAR JOINT ARTHRITIS: ICD-10-CM

## 2022-02-25 DIAGNOSIS — M25.512 CHRONIC PAIN OF BOTH SHOULDERS: Primary | ICD-10-CM

## 2022-02-25 DIAGNOSIS — M75.52 SUBDELTOID BURSITIS OF LEFT SHOULDER JOINT: ICD-10-CM

## 2022-02-25 DIAGNOSIS — M75.42 IMPINGEMENT SYNDROME OF LEFT SHOULDER: ICD-10-CM

## 2022-02-25 DIAGNOSIS — M75.111 NONTRAUMATIC INCOMPLETE TEAR OF RIGHT ROTATOR CUFF: ICD-10-CM

## 2022-02-25 DIAGNOSIS — G89.29 CHRONIC PAIN OF BOTH SHOULDERS: Primary | ICD-10-CM

## 2022-02-25 DIAGNOSIS — M75.101 ROTATOR CUFF SYNDROME OF BOTH SHOULDERS: ICD-10-CM

## 2022-02-25 DIAGNOSIS — M19.011 BILATERAL ACROMIOCLAVICULAR JOINT ARTHRITIS: ICD-10-CM

## 2022-02-25 DIAGNOSIS — M25.511 CHRONIC PAIN OF BOTH SHOULDERS: Primary | ICD-10-CM

## 2022-02-25 PROCEDURE — 99213 OFFICE O/P EST LOW 20 MIN: CPT | Performed by: NURSE PRACTITIONER

## 2022-03-02 PROBLEM — M75.42 IMPINGEMENT SYNDROME OF LEFT SHOULDER: Status: ACTIVE | Noted: 2022-03-02

## 2022-03-08 ENCOUNTER — LAB (OUTPATIENT)
Dept: LAB | Facility: HOSPITAL | Age: 77
End: 2022-03-08

## 2022-03-08 DIAGNOSIS — M81.0 AGE-RELATED OSTEOPOROSIS WITHOUT CURRENT PATHOLOGICAL FRACTURE: ICD-10-CM

## 2022-03-08 LAB
ALBUMIN SERPL-MCNC: 4.5 G/DL (ref 3.5–5.2)
ALBUMIN/GLOB SERPL: 1.7 G/DL
ALP SERPL-CCNC: 47 U/L (ref 39–117)
ALT SERPL W P-5'-P-CCNC: 25 U/L (ref 1–33)
ANION GAP SERPL CALCULATED.3IONS-SCNC: 12 MMOL/L (ref 5–15)
AST SERPL-CCNC: 22 U/L (ref 1–32)
BILIRUB SERPL-MCNC: 0.4 MG/DL (ref 0–1.2)
BUN SERPL-MCNC: 13 MG/DL (ref 8–23)
BUN/CREAT SERPL: 17.3 (ref 7–25)
CALCIUM SPEC-SCNC: 9.8 MG/DL (ref 8.6–10.5)
CHLORIDE SERPL-SCNC: 104 MMOL/L (ref 98–107)
CO2 SERPL-SCNC: 25 MMOL/L (ref 22–29)
CREAT SERPL-MCNC: 0.75 MG/DL (ref 0.57–1)
EGFRCR SERPLBLD CKD-EPI 2021: 83.1 ML/MIN/1.73
GLOBULIN UR ELPH-MCNC: 2.6 GM/DL
GLUCOSE SERPL-MCNC: 95 MG/DL (ref 65–99)
MAGNESIUM SERPL-MCNC: 2.3 MG/DL (ref 1.6–2.4)
PHOSPHATE SERPL-MCNC: 3.6 MG/DL (ref 2.5–4.5)
POTASSIUM SERPL-SCNC: 3.5 MMOL/L (ref 3.5–5.2)
PROT SERPL-MCNC: 7.1 G/DL (ref 6–8.5)
SODIUM SERPL-SCNC: 141 MMOL/L (ref 136–145)

## 2022-03-08 PROCEDURE — 80053 COMPREHEN METABOLIC PANEL: CPT

## 2022-03-08 PROCEDURE — 83735 ASSAY OF MAGNESIUM: CPT

## 2022-03-08 PROCEDURE — 36415 COLL VENOUS BLD VENIPUNCTURE: CPT

## 2022-03-08 PROCEDURE — 84100 ASSAY OF PHOSPHORUS: CPT

## 2022-03-14 ENCOUNTER — INFUSION (OUTPATIENT)
Dept: ONCOLOGY | Facility: HOSPITAL | Age: 77
End: 2022-03-14

## 2022-03-14 ENCOUNTER — APPOINTMENT (OUTPATIENT)
Dept: LAB | Facility: HOSPITAL | Age: 77
End: 2022-03-14

## 2022-03-14 VITALS — HEART RATE: 94 BPM | TEMPERATURE: 97.8 F | SYSTOLIC BLOOD PRESSURE: 160 MMHG | DIASTOLIC BLOOD PRESSURE: 76 MMHG

## 2022-03-14 DIAGNOSIS — M81.0 AGE-RELATED OSTEOPOROSIS WITHOUT CURRENT PATHOLOGICAL FRACTURE: Primary | ICD-10-CM

## 2022-03-14 PROCEDURE — 25010000002 DENOSUMAB 60 MG/ML SOLUTION PREFILLED SYRINGE: Performed by: GENERAL PRACTICE

## 2022-03-14 PROCEDURE — 96372 THER/PROPH/DIAG INJ SC/IM: CPT | Performed by: NURSE PRACTITIONER

## 2022-03-14 RX ADMIN — DENOSUMAB 60 MG: 60 INJECTION SUBCUTANEOUS at 13:52

## 2022-04-01 ENCOUNTER — OFFICE VISIT (OUTPATIENT)
Dept: ORTHOPEDIC SURGERY | Facility: CLINIC | Age: 77
End: 2022-04-01

## 2022-04-01 VITALS — BODY MASS INDEX: 30.36 KG/M2 | HEIGHT: 62 IN | WEIGHT: 165 LBS

## 2022-04-01 DIAGNOSIS — M25.511 CHRONIC PAIN OF BOTH SHOULDERS: Primary | ICD-10-CM

## 2022-04-01 DIAGNOSIS — M25.512 CHRONIC PAIN OF BOTH SHOULDERS: Primary | ICD-10-CM

## 2022-04-01 DIAGNOSIS — M75.102 ROTATOR CUFF SYNDROME OF BOTH SHOULDERS: ICD-10-CM

## 2022-04-01 DIAGNOSIS — M75.52 SUBDELTOID BURSITIS OF LEFT SHOULDER JOINT: ICD-10-CM

## 2022-04-01 DIAGNOSIS — M75.111 NONTRAUMATIC INCOMPLETE TEAR OF RIGHT ROTATOR CUFF: ICD-10-CM

## 2022-04-01 DIAGNOSIS — M19.011 BILATERAL ACROMIOCLAVICULAR JOINT ARTHRITIS: ICD-10-CM

## 2022-04-01 DIAGNOSIS — M19.012 BILATERAL ACROMIOCLAVICULAR JOINT ARTHRITIS: ICD-10-CM

## 2022-04-01 DIAGNOSIS — M75.42 IMPINGEMENT SYNDROME OF LEFT SHOULDER: ICD-10-CM

## 2022-04-01 DIAGNOSIS — M75.101 ROTATOR CUFF SYNDROME OF BOTH SHOULDERS: ICD-10-CM

## 2022-04-01 DIAGNOSIS — G89.29 CHRONIC PAIN OF BOTH SHOULDERS: Primary | ICD-10-CM

## 2022-04-01 PROCEDURE — 99213 OFFICE O/P EST LOW 20 MIN: CPT | Performed by: NURSE PRACTITIONER

## 2022-04-01 NOTE — PROGRESS NOTES
Robert Larios is a 76 y.o. female returns for     Chief Complaint   Patient presents with   • Right Shoulder - Follow-up, Pain   • Left Shoulder - Follow-up, Pain     HISTORY OF PRESENT ILLNESS: Patient presents to office for follow-up of chronic bilateral shoulder pain.  Patient has experienced ongoing and progressively worsening bilateral shoulder pain over the course of several years.  She initially established care with orthopedics on 12/28/2021. MRI imaging of the left shoulder showed evidence of AC joint arthropathy and osteoarthritic with evidence of subdeltoid bursitis and a small loose body.  There was no rotator cuff tear identified in the left shoulder.  Recent MRI imaging of the right shoulder showed evidence of mild degenerative changes of the glenohumeral joint and a partial-thickness undersurface tear of the supraspinatus tendon.  Patient has continued with her current course of physical therapy with Cara Shields and has continued to experience progressive improvement in both shoulders.  Patient reports that her left shoulder is significantly improved.  Patient also reports improvement in the right shoulder but does continue to have some mild pain in the right shoulder.  Range of motion and strength have been progressively improving since starting PT.  Patient was also previously given subacromial injections of steroid into both shoulders on 12/28/2021, which did offer good pain improvement.  No new complaints or concerns noted since last office visit.  No falls or injuries reported since last office visit.  Pain scale today is 1/10.     CONCURRENT MEDICAL HISTORY:    The following portions of the patient's history were reviewed and updated as appropriate: allergies, current medications, past family history, past medical history, past social history, past surgical history and problem list.     ROS  No fevers or chills.  No chest pain or shortness of air.  No GI or  disturbances. Right  "shoulder pain.     PHYSICAL EXAMINATION:       Ht 157.5 cm (62\")   Wt 74.8 kg (165 lb)   BMI 30.18 kg/m²     Physical Exam  Vitals reviewed.   Constitutional:       General: She is not in acute distress.     Appearance: She is well-developed. She is not ill-appearing.   HENT:      Head: Normocephalic.   Pulmonary:      Effort: Pulmonary effort is normal. No respiratory distress.   Abdominal:      General: There is no distension.      Palpations: Abdomen is soft.   Musculoskeletal:         General: Tenderness (Mild, right shoulder) present. No swelling, deformity or signs of injury.   Skin:     General: Skin is warm and dry.      Capillary Refill: Capillary refill takes less than 2 seconds.      Findings: No erythema.   Neurological:      Mental Status: She is alert and oriented to person, place, and time.      GCS: GCS eye subscore is 4. GCS verbal subscore is 5. GCS motor subscore is 6.      Sensory: No sensory deficit.   Psychiatric:         Speech: Speech normal.         Behavior: Behavior normal.         Thought Content: Thought content normal.         Judgment: Judgment normal.         GAIT:     []  Normal  [x]  Antalgic (mild)    Assistive device: [x]  None  []  Walker     []  Crutches  []  Cane     []  Wheelchair  []  Stretcher    Right Shoulder Exam     Tenderness   The patient is experiencing tenderness in the acromioclavicular joint (Mild, anterior shoulder).    Range of Motion   Active abduction: 160   Extension: 30   External rotation: 80   Forward flexion: 160   Internal rotation 90 degrees: 70     Tests   Ortega test: negative  Cross arm: negative  Impingement: negative  Drop arm: negative    Other   Erythema: absent  Sensation: normal  Pulse: present    Comments:  Empty can test positive.  Full can test positive.  No swelling appreciated.  Neurovascularly intact.      Left Shoulder Exam     Tenderness   The patient is experiencing no tenderness.     Range of Motion   Active abduction: 160 "   Extension: 40   External rotation: 80   Forward flexion: 160   Internal rotation 90 degrees: 70     Tests   Ortega test: positive  Cross arm: positive  Impingement: positive  Drop arm: negative    Other   Erythema: absent  Sensation: normal  Pulse: present     Comments:  No swelling appreciated.  Neurovascularly intact.            MRI LEFT SHOULDER WITHOUT CONTRAST     Indication: Shoulder pain.     Technique: 1.5 Rosey. Multiplanar sequences without contrast.     Findings: Caudal osteophytes at the AC joint indents the supraspinatus musculotendinous junction, a potential source for impingement syndrome.     No rotator cuff tendon defect is identified, and cuff muscle bulk/signal are normal. There is a thin fluid collection in the subdeltoid bursa compatible with bursitis.     Series 901, image 2 demonstrates a 4 mm round, hypointense structure in the subcoracoid bursa consistent with a small loose body.     Humeral head morphology and signal are normal. There is abnormal marginal osteophytosis at the glenohumeral joint, with subchondral edema in the posterior aspect of the glenoid. No paralabral cyst or spinoglenoid notch mass is evident. The deltoid muscle    is normal.     IMPRESSION:     1. Glenohumeral and acromioclavicular joint arthritic changes.   2. Subdeltoid bursitis. Small loose body in the subcoracoid bursa.   3. No convincing evidence for rotator cuff tear.      Joaquin Mcgee MD - 01/11/2022   Formatting of this note might be different from the original.     MRI RIGHT SHOULDER WITHOUT CONTRAST     Indication: Shoulder pain.     Technique: 1.5 Rosey. Multiplanar sequences without contrast.     Findings: There is a thin subdeltoid bursal fluid collection. The humeral head is in extreme internal rotation.     Series 701, image 5 demonstrates a 4 mm fluid collection within the anterior/undersurface insertional supraspinatus tendon fibers consistent with focal partial-thickness tearing.      Rotator cuff muscles are of normal signal intensity and bulk. The deltoid muscle is normal. No humeral head marrow edema is perceived. Mild arthritic changes are noted at the posterior aspect of the glenoid. There are no caudal osteophytes at the AC   joint.     IMPRESSION:     1. Partial-thickness undersurface tear anterior insertional supraspinatus tendon fibers.   2. Degenerative changes as described.     PROCEDURE:  Bilateral  Shoulder 3 views     REASON FOR EXAM:  pain, M25.511 Pain in right shoulder G89.29  Other chronic pain M25.512 Pain in left shoulder     FINDINGS: Right shoulder bony structures are normal. There is no  evidence of fracture or dislocation. Soft tissue structures are  normal.     Left shoulder bony structures are normal. There is no evidence of  fracture or dislocation. Soft tissue structures are normal.        IMPRESSION:  Negative bilateral shoulders.     Electronically signed by:  Sander Andres MD  11/3/2021 4:04 PM CDT  Workstation: [id number]      ASSESSMENT:    Diagnoses and all orders for this visit:    Chronic pain of both shoulders    Rotator cuff syndrome of both shoulders    Subdeltoid bursitis of left shoulder joint    Nontraumatic incomplete tear of right rotator cuff    Bilateral acromioclavicular joint arthritis    Impingement syndrome of left shoulder    PLAN    Patient is doing well overall and has continued to have progressive improvement.  She has continued with her current course of physical therapy with Cara Shields.  She anticipates that she will complete her physical therapy course next week.  I have encouraged her to complete the PT course and have also encouraged her to continue with her home exercises that she learns in physical therapy.  Overall, patient has done well and reports improved pain in both shoulders.  She continues to have some mild pain in the right shoulder but states her left shoulder is significantly improved.  Her overall range of motion and  strength in both shoulders has improved.  We discussed the possibility of repeat injections at some point if needed.  For now, she states that she is much better and declines any further injections.    Recommend the following for shoulder pain:      -Rest and activity modification during times of increased pain.  Otherwise, she can continue with activity as tolerated.  -Continue with home exercises for conditioning/strengthening that she previously learned in PT.   -Ice therapy to the affected shoulder intermittently 3-4 times daily for 15 minutes at a time to minimize pain/inflammation.  -Tylenol as needed for pain/discomfort.  The patient states she does take occasional OTC anti-inflammatory medications but overall she avoids oral NSAIDs as they exacerbate her irritable bowel syndrome.  For this reason, we will avoid prescription oral NSAIDs.    Follow-up in 6 weeks for recheck as needed for any new, worsening or persistent symptoms.    Time spent of a minimum of 20 minutes including the face to face evaluation, reviewing of medical history and prior medial records, reviewing of diagnostic studies, documentation, patient education and coordination of care.     EMR Dragon/Transciption Disclaimer: Some of this note may be an electronic transcription/translation of spoken language to printed text using the Dragon Dictation System.     Return in about 6 weeks (around 5/13/2022), or if symptoms worsen or fail to improve, for Recheck.        This document has been electronically signed by TEOFILO Flaherty on April 1, 2022 09:19 CDT      TEOFILO Flaherty

## 2022-04-15 ENCOUNTER — OFFICE VISIT (OUTPATIENT)
Dept: CARDIOLOGY | Facility: CLINIC | Age: 77
End: 2022-04-15

## 2022-04-15 VITALS
HEIGHT: 62 IN | TEMPERATURE: 98 F | OXYGEN SATURATION: 97 % | HEART RATE: 93 BPM | DIASTOLIC BLOOD PRESSURE: 78 MMHG | SYSTOLIC BLOOD PRESSURE: 134 MMHG | WEIGHT: 162 LBS | BODY MASS INDEX: 29.81 KG/M2

## 2022-04-15 DIAGNOSIS — R00.2 PALPITATION: ICD-10-CM

## 2022-04-15 DIAGNOSIS — E78.2 MIXED HYPERLIPIDEMIA: Chronic | ICD-10-CM

## 2022-04-15 DIAGNOSIS — I10 ESSENTIAL HYPERTENSION: Primary | ICD-10-CM

## 2022-04-15 LAB
QT INTERVAL: 390 MS
QTC INTERVAL: 484 MS

## 2022-04-15 PROCEDURE — 93000 ELECTROCARDIOGRAM COMPLETE: CPT | Performed by: INTERNAL MEDICINE

## 2022-04-15 PROCEDURE — 99214 OFFICE O/P EST MOD 30 MIN: CPT | Performed by: INTERNAL MEDICINE

## 2022-04-15 RX ORDER — METOPROLOL SUCCINATE 50 MG/1
50 TABLET, EXTENDED RELEASE ORAL 2 TIMES DAILY
Qty: 180 TABLET | Refills: 3 | Status: SHIPPED | OUTPATIENT
Start: 2022-04-15 | End: 2022-08-23

## 2022-04-15 NOTE — PROGRESS NOTES
Robert Larios  76 y.o. female      1. Essential hypertension    2. Palpitation    3. Mixed hyperlipidemia        History of Present Illness  Robert Larios is a 76-year-old female with history of intermittent fluttering in the chest most likely due to PACs/PVCs.  No sustained arrhythmias have been documented thus far. She has had history of hypertension, hyperlipidemia.    He has no previous documented coronary artery disease and CT of the coronary arteries in July 2017 showed EF of 53% and no CAD.    Echocardiogram on 4/3/2021 showed:  · Estimated left ventricular EF = 65% Left ventricular ejection fraction appears to be 61 - 65%. Left ventricular systolic function is normal.  · Left ventricular diastolic function is consistent with (grade I) impaired relaxation.  · Estimated right ventricular systolic pressure from tricuspid regurgitation is normal (<35 mmHg).    Event monitoring in 4/ 2021 showed:  Result: Underlying rhythm was sinus with heart rate ranging from 56 bpm to 102 bpm with an average heart rate of 80 bpm.  There were 380 PVCs which represented less than 1% of total beats.  There where 1002 PACs which also represented less than 1% of total beats.  There were 11 short nonsustained runs of PACs.  No definite atrial fibrillation was noted   There were 157 patient triggered events which mostly correlated with sinus rhythm with ectopy.  No sustained arrhythmias were noted.  Symptoms included palpitation, chest discomfort.  Impression: Event monitoring showing essentially sinus rhythm with rare PACs and PVCs with no sustained supraventricular ventricular arrhythmias.  Events correlated with sinus rhythm with ectopy.  No definite atrial fibrillation noted.    She continues to have intermittent fluttering in the chest but seems to occur about 2-3 times a week.  No sustained rhythm problems are reported.  No dizziness or syncope is reported.  She denied any chest pain or shortness of breath.    EKG  performed today showed sinus rhythm with heart rate of 93 bpm.  Nonspecific ST-T changes.      Allergies   Allergen Reactions   • Adhesive Tape      Blistering of Skin   • Garlic Other (See Comments)   • Lactose Other (See Comments)   • Propoxyphene Nausea And Vomiting   • Sulfamethoxazole Other (See Comments)   • Trimethoprim Other (See Comments)   • Bactrim [Sulfamethoxazole-Trimethoprim] Rash   • Macrodantin [Nitrofurantoin Macrocrystal]      Drug Fever   • Sulfa Antibiotics Rash         Past Medical History:   Diagnosis Date   • Acute otitis media 09/10/2015   • Acute pharyngitis 09/10/2015   • Acute sinusitis 09/10/2015   • Biliary colic 10/17/2014    STONES BY REVIEW US   • Breast cyst    • Change in bowel habits 06/28/2016   • Chest rales 9/262015    MEDIUM   • Cholelithiasis without obstruction 10/17/2014   • Chronic cholecystitis 10/17/2014   • Constipation 02/26/2015   • Degenerative joint disease involving multiple joints 09/10/2014   • Elevated blood-pressure reading without diagnosis of hypertension 05/31/2012    BLOOD PRESSURE NORMAL AT HOME   • Epigastric pain 09/10/2014   • Essential hypertension 07/30/2015   • Fibrocystic breast    • Gastritis 02/26/2015   • GERD (gastroesophageal reflux disease) 01/28/2015   • Hammer toe 12/02/2015   • History of colonoscopy 08/28/2014    Information taken from [facility]    • Hyperlipidemia 07/30/2015   • Ingrowing nail [id number]   • Ingrown toenail    • Irritable bowel disease    • Osteoporosis    • Pain in right foot 11/11/2015   • Peripheral neuropathy 09/10/2014   • Plantar fasciitis          Past Surgical History:   Procedure Laterality Date   • ADENOIDECTOMY     • BREAST BIOPSY  11/22/2004    R mammotome biop w/ image guided placement of metallic localization clip & stereotactic location & guidance for breast biop. radiologic examination of the surgical specimen & unilateral mammography   • BREAST CYST ASPIRATION     • CHOLECYSTECTOMY  10/09/2014   • COLONOSCOPY   08/28/2014    Hemorrhoids found.   • COLONOSCOPY N/A 12/6/2018    Procedure: COLONOSCOPY;  Surgeon: Everardo Beltran DO;  Location: [facility] ENDOSCOPY;  Service: Gastroenterology   • CYST REMOVAL     • DILATATION AND CURETTAGE  08/28/2014    Postmenopausal bleeding   • ENDOSCOPY  08/28/2014    Normal hypopharynx, esophagus. A hiatus hernia found in the GE junction.Non-erosive gastritis found inthe stomach. Multiple biopsies taken.Moderate amount of bile sained fluid in the stomach.Normal, symmetrical, and patent pylorus.Normal duodenum.   • INJECTION OF MEDICATION  07/30/2015    KENALOG(1)   • TONSILLECTOMY           Family History   Problem Relation Age of Onset   • Thyroid disease Mother    • Hypertension Mother    • Obesity Mother    • Osteoporosis Mother    • Stroke Mother    • Prostate cancer Father    • Cancer Father    • Diabetes Father    • Heart attack Father    • Hypertension Father    • Heart disease Father    • Obesity Father    • Cancer Other    • Diabetes Other    • Heart disease Other    • Stroke Other    • Thyroid disease Other    • Lung disease Other    • Arthritis Maternal Grandmother    • Kidney disease Maternal Grandmother    • Stroke Paternal Grandmother    • Diabetes Sister          Social History     Socioeconomic History   • Marital status:    Tobacco Use   • Smoking status: Never Smoker   • Smokeless tobacco: Never Used   Substance and Sexual Activity   • Alcohol use: No   • Drug use: No   • Sexual activity: Defer         Current Outpatient Medications   Medication Sig Dispense Refill   • Alum Hydroxide-Mag Carbonate (GAVISCON PO) Take  by mouth.     • aspirin 81 MG EC tablet Take 81 mg by mouth 1 (One) Time Per Week.     • B Complex Vitamins (vitamin b complex) capsule capsule Take  by mouth Daily.     • Bismuth Subsalicylate (PEPTO-BISMOL PO) Take  by mouth.     • Calcium Carbonate-Vit D-Min (CALTRATE PLUS PO) Take 1 tablet by mouth 2 (Two) Times a Day.     • Cholecalciferol  "(VITAMIN D3) 5000 units capsule capsule Take 4,000 Units by mouth Daily.     • coenzyme Q10 100 MG capsule Take 100 mg by mouth Daily.     • colestipol (COLESTID) 1 g tablet As Needed.     • denosumab (PROLIA) 60 MG/ML solution prefilled syringe syringe Inject  under the skin into the appropriate area as directed. Every 6 mths     • esomeprazole (nexIUM) 20 MG capsule Take 20 mg by mouth Every Morning Before Breakfast.     • fluticasone (CUTIVATE) 0.05 % cream      • melatonin 1 MG tablet Take  by mouth At Night As Needed for Sleep.     • metoprolol succinate XL (TOPROL-XL) 50 MG 24 hr tablet Take 1 tablet by mouth Daily. 90 tablet 3   • multivitamin with minerals tablet tablet Take 1 tablet by mouth Daily.     • NON FORMULARY 30mg vitamin B-6  400 mcg vitamin B-12  350 mg DHA  150 mg EPA  200 mg L-Carnitine  50mg CO-Q 10  30 mg trans-resveratrol     • Nutritional Supplements (VITEYES TEAR SUPPORT PO) Take  by mouth.     • pantoprazole (PROTONIX) 40 MG EC tablet Take 40 mg by mouth Daily.       No current facility-administered medications for this visit.         OBJECTIVE    /78 (BP Location: Left arm, Patient Position: Sitting, Cuff Size: Adult)   Pulse 93   Temp 98 °F (36.7 °C)   Ht 157.5 cm (62\")   Wt 73.5 kg (162 lb)   SpO2 97%   BMI 29.63 kg/m²         Review of Systems: The following systems are reviewed and no changes noted    Constitutional:  Denies recent weight loss, weight gain, fever or chills     HENT:  Denies any hearing loss, epistaxis, hoarseness, or difficulty speaking.     Eyes: Wears eyeglasses or contact lenses     Respiratory:  Denies dyspnea with exertion,no cough, wheezing, or hemoptysis.     Cardiovascular: Intermittent fluttering the chest, PACs/PVCs    Gastrointestinal:  Denies change in bowel habits, dyspepsia, ulcer disease, hematochezia, or melena.     Endocrine: Negative for cold intolerance, heat intolerance, polydipsia, polyphagia and polyuria.     Genitourinary: " Negative.      Musculoskeletal: Denies any history of arthritic symptoms or back problems     Neurological:  Denies any history of recurrent headaches, strokes, TIA, or seizure disorder.     Hematological: Denies any food allergies, seasonal allergies, bleeding disorders, or lymphadenopathy.     Psychiatric/Behavioral: Denies any history of depression, substance abuse, or change in cognitive function.       Physical Exam: The following systems were reassessed and no changes noted    Constitutional: Cooperative, alert and oriented,  in no acute distress.     HENT:   Head: Normocephalic, normal hair patterns, no masses or tenderness.  Ears, Nose, and Throat: No gross abnormalities. No pallor or cyanosis. Dentition good.   Eyes: EOMS intact, PERRL, conjunctivae and lids unremarkable. Fundoscopic exam and visual fields not performed.   Neck: No palpable masses or adenopathy, no thyromegaly, no JVD, carotid pulses are full and equal bilaterally and without  Bruits.     Cardiovascular: Regular rhythm, S1 and S2 normal, no S3 or S4.  No murmurs, gallops, or rubs detected.     Pulmonary/Chest: Chest: normal symmetry, normal respiratory excursion, no intercostal retraction, no use of accessory muscles.            Pulmonary: Normal breath sounds. No rales or ronchi.    Abdominal: Abdomen soft, bowel sounds normoactive, no masses, no hepatosplenomegaly, non-tender, no bruits.     Musculoskeletal: No deformities, clubbing, cyanosis, erythema, or edema observed.     Neurological: No gross motor or sensory deficits noted, affect appropriate, oriented to time, person, place.     Skin: Warm and dry to the touch, no apparent skin lesions or masses noted.     Psychiatric: She has a normal mood and affect. Her behavior is normal. Judgment and thought content normal.         Procedures      Lab Results   Component Value Date    WBC 6.73 07/14/2017    HGB 14.9 07/14/2017    HCT 44.7 07/14/2017    MCV 92.2 07/14/2017      07/14/2017     Lab Results   Component Value Date    GLUCOSE 95 03/08/2022    BUN 13 03/08/2022    CREATININE 0.75 03/08/2022    EGFRIFNONA 66 10/27/2021    BCR 17.3 03/08/2022    CO2 25.0 03/08/2022    CALCIUM 9.8 03/08/2022    ALBUMIN 4.50 03/08/2022    AST 22 03/08/2022    ALT 25 03/08/2022     Lab Results   Component Value Date    CHOL 213 (H) 10/27/2021    CHOL 203 (H) 10/28/2020    CHOL 201 (H) 10/14/2019     Lab Results   Component Value Date    TRIG 157 (H) 10/27/2021    TRIG 129 10/28/2020    TRIG 144 10/14/2019     Lab Results   Component Value Date    HDL 64 (H) 10/27/2021    HDL 67 (H) 10/28/2020    HDL 58 10/14/2019     No components found for: LDLCALC  Lab Results   Component Value Date     (H) 10/27/2021     (H) 10/28/2020     (H) 10/14/2019     (H) 10/14/2019     No results found for: HDLLDLRATIO  No components found for: CHOLHDL  Lab Results   Component Value Date    HGBA1C 5.6 07/27/2015     Lab Results   Component Value Date    TSH 1.840 03/15/2021           ASSESSMENT AND PLAN  Robert Larios is a 76-year-old female with history of hypertension, mild hyperlipidemia, with intermittent skipped beats going back several months, due to PACs/PVCs with no sustained arrhythmias.  She has no previous documented coronary artery disease.    I once again had a discussion with her about different treatment options and and did suggest repeating an event monitor to make sure that there are no other unsuspected arrhythmias.  The other option presented was increasing the dose of metoprolol succinate to 50 mg in a.m. and 25 mg in p.m. She wishes to consider this at this time.  Antiplatelet therapy with aspirin has been continued.    Her lab results from October 2021 were reviewed.     Diagnoses and all orders for this visit:    1. Essential hypertension (Primary)  -     ECG 12 Lead    2. Palpitation    3. Mixed hyperlipidemia        Patient's Body mass index is 29.63 kg/m². BMI is above  normal parameters. Recommendations include: exercise counseling and nutrition counseling.      Robert Larios  reports that she has never smoked. She has never used smokeless tobacco..          Peggy Govea MD  4/15/2022  10:25 CDT

## 2022-05-26 ENCOUNTER — TRANSCRIBE ORDERS (OUTPATIENT)
Dept: ORTHOPEDIC SURGERY | Facility: CLINIC | Age: 77
End: 2022-05-26

## 2022-05-26 DIAGNOSIS — M67.442 DIGITAL MUCINOUS CYST OF FINGER OF LEFT HAND: Primary | ICD-10-CM

## 2022-06-28 DIAGNOSIS — Z12.31 ENCOUNTER FOR SCREENING MAMMOGRAM FOR BREAST CANCER: Primary | ICD-10-CM

## 2022-07-01 DIAGNOSIS — M79.642 LEFT HAND PAIN: Primary | ICD-10-CM

## 2022-07-12 ENCOUNTER — OFFICE VISIT (OUTPATIENT)
Dept: ORTHOPEDIC SURGERY | Facility: CLINIC | Age: 77
End: 2022-07-12

## 2022-07-12 VITALS — HEIGHT: 62 IN | WEIGHT: 164.7 LBS | BODY MASS INDEX: 30.31 KG/M2

## 2022-07-12 DIAGNOSIS — M79.642 LEFT HAND PAIN: ICD-10-CM

## 2022-07-12 DIAGNOSIS — M67.442 DIGITAL MUCOUS CYST OF FINGER OF LEFT HAND: Primary | ICD-10-CM

## 2022-07-12 DIAGNOSIS — I10 ESSENTIAL HYPERTENSION: ICD-10-CM

## 2022-07-12 PROCEDURE — 99213 OFFICE O/P EST LOW 20 MIN: CPT | Performed by: ORTHOPAEDIC SURGERY

## 2022-07-12 NOTE — PROGRESS NOTES
Robert Larios is a 76 y.o. female   Primary provider:  Ida Shah MD       Chief Complaint   Patient presents with   • Left Hand - Pain       HISTORY OF PRESENT ILLNESS: Patient is here today for left thumb pain. She was sent to [facility] upon arrival.   Patient has been having pain in her left thumb for several weeks now.  She developed a cystic lesion on the dorsal aspect of her thumb.  She has pain when she hits it on things.  No fevers or chills.  No numbness or tingling.    Pain  This is a new problem. The current episode started more than 1 month ago. The problem occurs intermittently. The problem has been unchanged. Associated symptoms comments: Burning/itching. Exacerbated by: Bumping it on things. She has tried nothing for the symptoms. The treatment provided no relief.        CONCURRENT MEDICAL HISTORY:    Past Medical History:   Diagnosis Date   • Acute otitis media 09/10/2015   • Acute pharyngitis 09/10/2015   • Acute sinusitis 09/10/2015   • Biliary colic 10/17/2014    STONES BY REVIEW US   • Breast cyst    • Change in bowel habits 06/28/2016   • Chest rales 9/262015    MEDIUM   • Cholelithiasis without obstruction 10/17/2014   • Chronic cholecystitis 10/17/2014   • Constipation 02/26/2015   • Degenerative joint disease involving multiple joints 09/10/2014   • Elevated blood-pressure reading without diagnosis of hypertension 05/31/2012    BLOOD PRESSURE NORMAL AT HOME   • Epigastric pain 09/10/2014   • Essential hypertension 07/30/2015   • Fibrocystic breast    • Gastritis 02/26/2015   • GERD (gastroesophageal reflux disease) 01/28/2015   • Hammer toe 12/02/2015   • History of colonoscopy 08/28/2014    Information [facility]    • Hyperlipidemia 07/30/2015   • Ingrowing nail [id number]   • Ingrown toenail    • Irritable bowel disease    • Osteoporosis    • Pain in right foot 11/11/2015   • Peripheral neuropathy 09/10/2014   • Plantar fasciitis        Allergies   Allergen Reactions   •  Adhesive Tape      Blistering of Skin   • Garlic Other (See Comments)   • Lactose Other (See Comments)   • Propoxyphene Nausea And Vomiting   • Sulfamethoxazole Other (See Comments)   • Trimethoprim Other (See Comments)   • Bactrim [Sulfamethoxazole-Trimethoprim] Rash   • Macrodantin [Nitrofurantoin Macrocrystal]      Drug Fever   • Sulfa Antibiotics Rash         Current Outpatient Medications:   •  Alum Hydroxide-Mag Carbonate (GAVISCON PO), Take  by mouth., Disp: , Rfl:   •  aspirin 81 MG EC tablet, Take 81 mg by mouth 1 (One) Time Per Week., Disp: , Rfl:   •  B Complex Vitamins (vitamin b complex) capsule capsule, Take  by mouth Daily., Disp: , Rfl:   •  Bismuth Subsalicylate (PEPTO-BISMOL PO), Take  by mouth., Disp: , Rfl:   •  Calcium Carbonate-Vit D-Min (CALTRATE PLUS PO), Take 1 tablet by mouth 2 (Two) Times a Day., Disp: , Rfl:   •  Cholecalciferol (VITAMIN D3) 5000 units capsule capsule, Take 4,000 Units by mouth Daily., Disp: , Rfl:   •  coenzyme Q10 100 MG capsule, Take 100 mg by mouth Daily., Disp: , Rfl:   •  colestipol (COLESTID) 1 g tablet, As Needed., Disp: , Rfl:   •  denosumab (PROLIA) 60 MG/ML solution prefilled syringe syringe, Inject  under the skin into the appropriate area as directed. Every 6 mths, Disp: , Rfl:   •  esomeprazole (nexIUM) 20 MG capsule, Take 20 mg by mouth Every Morning Before Breakfast., Disp: , Rfl:   •  fluticasone (CUTIVATE) 0.05 % cream, , Disp: , Rfl:   •  melatonin 1 MG tablet, Take  by mouth At Night As Needed for Sleep., Disp: , Rfl:   •  metoprolol succinate XL (TOPROL-XL) 50 MG 24 hr tablet, Take 1 tablet by mouth 2 (Two) Times a Day., Disp: 180 tablet, Rfl: 3  •  multivitamin with minerals tablet tablet, Take 1 tablet by mouth Daily., Disp: , Rfl:   •  NON FORMULARY, 30mg vitamin B-6 400 mcg vitamin B-12 350 mg  mg  mg L-Carnitine 50mg CO-Q 10 30 mg trans-resveratrol, Disp: , Rfl:   •  Nutritional Supplements (VITEYES TEAR SUPPORT PO), Take  by  mouth., Disp: , Rfl:     Past Surgical History:   Procedure Laterality Date   • ADENOIDECTOMY     • BREAST BIOPSY  11/22/2004    R mammotome biop w/ image guided placement of metallic localization clip & stereotactic location & guidance for breast biop. radiologic examination of the surgical specimen & unilateral mammography   • BREAST CYST ASPIRATION     • CHOLECYSTECTOMY  10/09/2014   • COLONOSCOPY  08/28/2014    Hemorrhoids found.   • COLONOSCOPY N/A 12/6/2018    Procedure: COLONOSCOPY;  Surgeon: Everardo Beltran DO;  Location: [facility] ENDOSCOPY;  Service: Gastroenterology   • CYST REMOVAL     • DILATATION AND CURETTAGE  08/28/2014    Postmenopausal bleeding   • ENDOSCOPY  08/28/2014    Normal hypopharynx, esophagus. A hiatus hernia found in the GE junction.Non-erosive gastritis found inthe stomach. Multiple biopsies taken.Moderate amount of bile sained fluid in the stomach.Normal, symmetrical, and patent pylorus.Normal duodenum.   • INJECTION OF MEDICATION  07/30/2015    KENALOG(1)   • TONSILLECTOMY         Family History   Problem Relation Age of Onset   • Thyroid disease Mother    • Hypertension Mother    • Obesity Mother    • Osteoporosis Mother    • Stroke Mother    • Prostate cancer Father    • Cancer Father    • Diabetes Father    • Heart attack Father    • Hypertension Father    • Heart disease Father    • Obesity Father    • Cancer Other    • Diabetes Other    • Heart disease Other    • Stroke Other    • Thyroid disease Other    • Lung disease Other    • Arthritis Maternal Grandmother    • Kidney disease Maternal Grandmother    • Stroke Paternal Grandmother    • Diabetes Sister         Social History     Socioeconomic History   • Marital status:    Tobacco Use   • Smoking status: Never Smoker   • Smokeless tobacco: Never Used   Substance and Sexual Activity   • Alcohol use: No   • Drug use: No   • Sexual activity: Defer        Review of Systems   Constitutional: Positive for unexpected weight  "change.   HENT: Negative.    Eyes: Negative.    Respiratory: Negative.    Cardiovascular: Negative.    Gastrointestinal: Negative.    Endocrine: Negative.    Genitourinary: Negative.    Musculoskeletal:        Stiffness   Skin: Negative.    Allergic/Immunologic: Negative.    Neurological: Negative.    Hematological: Negative.    Psychiatric/Behavioral: Negative.        PHYSICAL EXAMINATION:       Ht 157.5 cm (62\")   Wt 74.7 kg (164 lb 11.2 oz)   BMI 30.12 kg/m²     Physical Exam  Constitutional:       General: She is not in acute distress.     Appearance: Normal appearance.   Pulmonary:      Effort: Pulmonary effort is normal. No respiratory distress.   Neurological:      Mental Status: She is alert and oriented to person, place, and time.         GAIT:     [x]  Normal  []  Antalgic    Assistive device: [x]  None  []  Walker     []  Crutches  []  Cane     []  Wheelchair  []  Stretcher    Left Hand Exam     Comments:  Good range of motion.  Good distal pulses and sensation.  Good capillary refill.  She does have a cystic lesion overlying the dorsal aspect of the thumb just distal to the DIP joint.  Minimal change to the nail is noted.  This has the appearance of a mucous cyst.              XR Hand 3+ View Left    Result Date: 7/12/2022  Narrative: Ordering Provider:  Gregory Blanton MD Ordering Diagnosis/Indication:  Left hand pain Procedure:  XR HAND 3+ VW LEFT Exam Date:  7/12/22 COMPARISON:  Not applicable, no relevant images available.     Impression:  3 views of the left hand show acceptable position and alignment with no evidence of acute bony abnormality.  No fracture or dislocation is noted.  Mild diffuse arthritic changes are noted in the DIP joints diffusely around the hand.  Some arthritic change noted also in the PIP joints.  Moderate arthritic changes noted in the first CMC joint.  No acute findings. Gregory Blanton MD 7/12/22           ASSESSMENT:    Diagnoses and all orders for this " visit:    Digital mucous cyst of finger of left hand    Left hand pain    Essential hypertension          PLAN    We discussed conservative management and symptomatic treatment at this point.  We discussed continued observation, Band-Aid, wraps and management without surgical intervention.  However, we also discussed that if her symptoms persisted then we could proceed with cyst excision.  Due to the location and size we discussed that we would proceed with surgical intervention in the operating room if necessary.  Activity as tolerated and follow-up as needed.    Return if symptoms worsen or fail to improve, for recheck.    Gregory Blanton MD

## 2022-08-23 ENCOUNTER — OFFICE VISIT (OUTPATIENT)
Dept: PODIATRY | Facility: CLINIC | Age: 77
End: 2022-08-23

## 2022-08-23 VITALS — WEIGHT: 164 LBS | HEART RATE: 88 BPM | OXYGEN SATURATION: 97 % | HEIGHT: 62 IN | BODY MASS INDEX: 30.18 KG/M2

## 2022-08-23 DIAGNOSIS — L60.0 INGROWN TOENAIL: ICD-10-CM

## 2022-08-23 DIAGNOSIS — M77.42 METATARSALGIA OF BOTH FEET: ICD-10-CM

## 2022-08-23 DIAGNOSIS — M77.41 METATARSALGIA OF BOTH FEET: ICD-10-CM

## 2022-08-23 DIAGNOSIS — M79.671 BILATERAL FOOT PAIN: Primary | ICD-10-CM

## 2022-08-23 DIAGNOSIS — M79.672 BILATERAL FOOT PAIN: Primary | ICD-10-CM

## 2022-08-23 PROCEDURE — 99203 OFFICE O/P NEW LOW 30 MIN: CPT | Performed by: PODIATRIST

## 2022-08-23 RX ORDER — METOPROLOL SUCCINATE 50 MG/1
1 TABLET, EXTENDED RELEASE ORAL 2 TIMES DAILY
COMMUNITY
Start: 2022-04-15 | End: 2022-08-23

## 2022-08-23 RX ORDER — PANTOPRAZOLE SODIUM 40 MG/1
40 TABLET, DELAYED RELEASE ORAL DAILY
COMMUNITY
Start: 2022-08-09

## 2022-08-24 ENCOUNTER — TRANSCRIBE ORDERS (OUTPATIENT)
Dept: PODIATRY | Facility: CLINIC | Age: 77
End: 2022-08-24

## 2022-08-24 DIAGNOSIS — M77.40 METATARSALGIA, UNSPECIFIED LATERALITY: Primary | ICD-10-CM

## 2022-09-01 ENCOUNTER — HOSPITAL ENCOUNTER (OUTPATIENT)
Dept: PHYSICAL THERAPY | Facility: HOSPITAL | Age: 77
Setting detail: THERAPIES SERIES
Discharge: HOME | End: 2022-09-01

## 2022-09-01 DIAGNOSIS — M77.41 METATARSALGIA OF BOTH FEET: Primary | ICD-10-CM

## 2022-09-01 DIAGNOSIS — M77.42 METATARSALGIA OF BOTH FEET: Primary | ICD-10-CM

## 2022-09-01 PROCEDURE — 97161 PT EVAL LOW COMPLEX 20 MIN: CPT | Performed by: PHYSICAL THERAPIST

## 2022-09-01 NOTE — THERAPY EVALUATION
Outpatient Physical Therapy Ortho Initial Evaluation  [facility]     Patient Name: Robert Larios  : 1945  MRN: [id number]  Today's Date: 2022      Visit Date: 2022    Patient Active Problem List   Diagnosis   • Hyperlipidemia   • GERD (gastroesophageal reflux disease)   • Essential hypertension   • Degenerative joint disease involving multiple joints   • Constipation   • Nuclear cataract   • Episcleritis   • Corneal ulcer, marginal   • Palpitation   • Abnormal EKG   • Dizziness   • Precordial pain   • Osteoporosis   • Irritable bowel syndrome   • Chronic pain of both shoulders   • Rotator cuff syndrome of both shoulders   • Chronic right-sided low back pain with right-sided sciatica   • Chronic right SI joint pain   • DDD (degenerative disc disease), lumbar   • Facet arthropathy, lumbar   • Tethered cord (HCC)   • Subdeltoid bursitis of left shoulder joint   • Impingement syndrome of right shoulder   • Nontraumatic incomplete tear of right rotator cuff   • Bilateral acromioclavicular joint arthritis   • Impingement syndrome of left shoulder   • Left hand pain        Past Medical History:   Diagnosis Date   • Acute otitis media 09/10/2015   • Acute pharyngitis 09/10/2015   • Acute sinusitis 09/10/2015   • Biliary colic 10/17/2014    STONES BY REVIEW US   • Breast cyst    • Change in bowel habits 2016   • Chest rales     MEDIUM   • Cholelithiasis without obstruction 10/17/2014   • Chronic cholecystitis 10/17/2014   • Constipation 2015   • Degenerative joint disease involving multiple joints 09/10/2014   • Elevated blood-pressure reading without diagnosis of hypertension 2012    BLOOD PRESSURE NORMAL AT HOME   • Epigastric pain 09/10/2014   • Essential hypertension 2015   • Fibrocystic breast    • Gastritis 2015   • GERD (gastroesophageal reflux disease) 2015   • Hammer toe 2015   • History of colonoscopy 2014    Information  [facility]    • Hyperlipidemia 07/30/2015   • Ingrowing nail [id number]   • Ingrown toenail    • Irritable bowel disease    • Osteoporosis    • Pain in right foot 11/11/2015   • Peripheral neuropathy 09/10/2014   • Plantar fasciitis         Past Surgical History:   Procedure Laterality Date   • ADENOIDECTOMY     • BREAST BIOPSY  11/22/2004    R mammotome biop w/ image guided placement of metallic localization clip & stereotactic location & guidance for breast biop. radiologic examination of the surgical specimen & unilateral mammography   • BREAST CYST ASPIRATION     • CHOLECYSTECTOMY  10/09/2014   • COLONOSCOPY  08/28/2014    Hemorrhoids found.   • COLONOSCOPY N/A 12/6/2018    Procedure: COLONOSCOPY;  Surgeon: Everardo Beltran DO;  Location[facility] END[facility];  Service: Gastroenterology   • CYST REMOVAL     • DILATATION AND CURETTAGE  08/28/2014    Postmenopausal bleeding   • ENDOSCOPY  08/28/2014    Normal hypopharynx, esophagus. A hiatus hernia found in the GE junction.Non-erosive gastritis found inthe stomach. Multiple biopsies taken.Moderate amount of bile sained fluid in the stomach.Normal, symmetrical, and patent pylorus.Normal duodenum.   • INJECTION OF MEDICATION  07/30/2015    KENALOG(1)   • TONSILLECTOMY         Visit Dx:     ICD-10-CM ICD-9-CM   1. Metatarsalgia of both feet  M77.41 726.70    M77.42               PT Ortho     Row Name 09/01/22 7211       Subjective Comments    Subjective Comments Present today with reports of needing new orthotics. Reports last ones assisted in symptoms but are older and now having pain of the left MTP joints and right great toe.  -BB       Precautions and Contraindications    Precautions/Limitations no known precautions/limitations  -BB       Subjective Pain    Able to rate subjective pain? yes  -BB    Subjective Pain Comment specific level not noted but reports pain of the left met heads and right great toe  -BB       Posture/Observations    Posture/Observations  Comments bilateral pes cavus with supinated foot in gait. Skin is in tact. No breakdown or callusing noted. sensation in tact  -BB          User Key  (r) = Recorded By, (t) = Taken By, (c) = Cosigned By    Initials Name Provider Type    Kristy Earl PT DPT Physical Therapist                            Therapy Education  Education Details: 1-2 hour break in period switching from new to old and skin inspection      PT OP Goals     Row Name 09/01/22 1350          PT Short Term Goals    STG Date to Achieve 09/22/22  -BB     STG 1 Independent in orthotic wear schedule and skin inspection  -BB            Time Calculation    PT Goal Re-Cert Due Date 09/22/22  -BB           User Key  (r) = Recorded By, (t) = Taken By, (c) = Cosigned By    Initials Name Provider Type    Kristy Earl PT DPT Physical Therapist                 PT Assessment/Plan     Row Name 09/01/22 2134          PT Assessment    Functional Limitations Impaired gait  -BB     Impairments Gait;Pain;Poor body mechanics  -BB     Assessment Comments Patient presents with need for continued off loading and support of custom orthotics as previously had. Orthotics to be fabricated per DPM recommendations.  -BB     Rehab Potential Good  -BB     Patient/caregiver participated in establishment of treatment plan and goals Yes  -BB     Patient would benefit from skilled therapy intervention Yes  -BB            PT Plan    Predicted Duration of Therapy Intervention (PT) PRN  -BB     Planned CPT's? PT EVAL LOW COMPLEXITY: 27941;PT INITIAL ORTHOTIC MGMT/TRAIN EA 15 MIN: 83655;PT THER SUPP EA 15 MIN  -BB     PT Plan Comments orthotic checkout and adjustment PRN  -BB           User Key  (r) = Recorded By, (t) = Taken By, (c) = Cosigned By    Initials Name Provider Type    Kristy Earl PT DPT Physical Therapist                   OP Exercises     Row Name 09/01/22 9956             Subjective Comments    Subjective Comments Present today with reports of needing  new orthotics. Reports last ones assisted in symptoms but are older and now having pain of the left MTP joints and right great toe.  -BB              Subjective Pain    Able to rate subjective pain? yes  -BB      Subjective Pain Comment specific level not noted but reports pain of the left met heads and right great toe  -BB              Exercise 1    Exercise Name 1 scan for custom orthotics  -BB            User Key  (r) = Recorded By, (t) = Taken By, (c) = Cosigned By    Initials Name Provider Type    BB Kristy Mcgee PT DPT Physical Therapist                                        Time Calculation:     Start Time: 1350  Stop Time: 1400  Time Calculation (min): 10 min     Therapy Charges for Today     Code Description Service Date Service Provider Modifiers Qty    [id number] HC PT EVAL LOW COMPLEXITY 1 9/1/2022 Kristy Mcgee PT DPT GP 1    [id number]  PT-CUSTOM ORTHOTICS-LEVEL 2 9/1/2022 Kristy Mcgee PT DPT  1                    Kristy Mcgee PT DPT  9/1/2022

## 2022-09-08 ENCOUNTER — LAB (OUTPATIENT)
Dept: LAB | Facility: HOSPITAL | Age: 77
End: 2022-09-08

## 2022-09-08 DIAGNOSIS — M81.0 AGE-RELATED OSTEOPOROSIS WITHOUT CURRENT PATHOLOGICAL FRACTURE: ICD-10-CM

## 2022-09-08 DIAGNOSIS — M81.0 AGE-RELATED OSTEOPOROSIS WITHOUT CURRENT PATHOLOGICAL FRACTURE: Primary | ICD-10-CM

## 2022-09-08 LAB
ALBUMIN SERPL-MCNC: 4.1 G/DL (ref 3.5–5.2)
ALBUMIN/GLOB SERPL: 1.6 G/DL
ALP SERPL-CCNC: 42 U/L (ref 39–117)
ALT SERPL W P-5'-P-CCNC: 24 U/L (ref 1–33)
ANION GAP SERPL CALCULATED.3IONS-SCNC: 10.1 MMOL/L (ref 5–15)
AST SERPL-CCNC: 24 U/L (ref 1–32)
BILIRUB SERPL-MCNC: 0.3 MG/DL (ref 0–1.2)
BUN SERPL-MCNC: 11 MG/DL (ref 8–23)
BUN/CREAT SERPL: 14.9 (ref 7–25)
CALCIUM SPEC-SCNC: 9.5 MG/DL (ref 8.6–10.5)
CHLORIDE SERPL-SCNC: 103 MMOL/L (ref 98–107)
CO2 SERPL-SCNC: 24.9 MMOL/L (ref 22–29)
CREAT SERPL-MCNC: 0.74 MG/DL (ref 0.57–1)
EGFRCR SERPLBLD CKD-EPI 2021: 84 ML/MIN/1.73
GLOBULIN UR ELPH-MCNC: 2.5 GM/DL
GLUCOSE SERPL-MCNC: 95 MG/DL (ref 65–99)
MAGNESIUM SERPL-MCNC: 2.4 MG/DL (ref 1.6–2.4)
PHOSPHATE SERPL-MCNC: 3.3 MG/DL (ref 2.5–4.5)
POTASSIUM SERPL-SCNC: 4.1 MMOL/L (ref 3.5–5.2)
PROT SERPL-MCNC: 6.6 G/DL (ref 6–8.5)
SODIUM SERPL-SCNC: 138 MMOL/L (ref 136–145)

## 2022-09-08 PROCEDURE — 84100 ASSAY OF PHOSPHORUS: CPT

## 2022-09-08 PROCEDURE — 36415 COLL VENOUS BLD VENIPUNCTURE: CPT

## 2022-09-08 PROCEDURE — 80053 COMPREHEN METABOLIC PANEL: CPT

## 2022-09-08 PROCEDURE — 83735 ASSAY OF MAGNESIUM: CPT

## 2022-09-13 DIAGNOSIS — M81.0 AGE-RELATED OSTEOPOROSIS WITHOUT CURRENT PATHOLOGICAL FRACTURE: Primary | ICD-10-CM

## 2022-09-15 ENCOUNTER — INFUSION (OUTPATIENT)
Dept: ONCOLOGY | Facility: HOSPITAL | Age: 77
End: 2022-09-15

## 2022-09-15 VITALS — DIASTOLIC BLOOD PRESSURE: 68 MMHG | SYSTOLIC BLOOD PRESSURE: 144 MMHG | HEART RATE: 76 BPM | TEMPERATURE: 98.2 F

## 2022-09-15 DIAGNOSIS — M81.0 AGE-RELATED OSTEOPOROSIS WITHOUT CURRENT PATHOLOGICAL FRACTURE: Primary | ICD-10-CM

## 2022-09-15 PROCEDURE — 96372 THER/PROPH/DIAG INJ SC/IM: CPT | Performed by: NURSE PRACTITIONER

## 2022-09-15 PROCEDURE — 25010000002 DENOSUMAB 60 MG/ML SOLUTION PREFILLED SYRINGE: Performed by: GENERAL PRACTICE

## 2022-09-15 RX ADMIN — DENOSUMAB 60 MG: 60 INJECTION SUBCUTANEOUS at 15:29

## 2022-10-17 ENCOUNTER — OFFICE VISIT (OUTPATIENT)
Dept: CARDIOLOGY | Facility: CLINIC | Age: 77
End: 2022-10-17

## 2022-10-17 VITALS
SYSTOLIC BLOOD PRESSURE: 160 MMHG | BODY MASS INDEX: 30.09 KG/M2 | HEART RATE: 80 BPM | OXYGEN SATURATION: 95 % | WEIGHT: 163.5 LBS | HEIGHT: 62 IN | DIASTOLIC BLOOD PRESSURE: 90 MMHG

## 2022-10-17 DIAGNOSIS — E78.2 MIXED HYPERLIPIDEMIA: Chronic | ICD-10-CM

## 2022-10-17 DIAGNOSIS — R00.2 PALPITATION: Primary | ICD-10-CM

## 2022-10-17 DIAGNOSIS — I10 ESSENTIAL HYPERTENSION: Chronic | ICD-10-CM

## 2022-10-17 PROCEDURE — 99214 OFFICE O/P EST MOD 30 MIN: CPT | Performed by: INTERNAL MEDICINE

## 2022-10-17 NOTE — PROGRESS NOTES
Robert Larios  77 y.o. female      1. Palpitation    2. Essential hypertension    3. Mixed hyperlipidemia        History of Present Illness  Robert Larios is a 77-year-old female with history of intermittent fluttering in the chest most likely due to PACs/PVCs.  No sustained arrhythmias have been documented thus far. She has had history of hypertension, hyperlipidemia.    He has no previous documented coronary artery disease and CT of the coronary arteries in July 2017 showed EF of 53% and no CAD.    Echocardiogram on 4/3/2021 showed:  · Estimated left ventricular EF = 65% Left ventricular ejection fraction appears to be 61 - 65%. Left ventricular systolic function is normal.  · Left ventricular diastolic function is consistent with (grade I) impaired relaxation.  · Estimated right ventricular systolic pressure from tricuspid regurgitation is normal (<35 mmHg).    Event monitoring in 4/ 2021 showed:  Result: Underlying rhythm was sinus with heart rate ranging from 56 bpm to 102 bpm with an average heart rate of 80 bpm.  There were 380 PVCs which represented less than 1% of total beats.  There where 1002 PACs which also represented less than 1% of total beats.  There were 11 short nonsustained runs of PACs.  No definite atrial fibrillation was noted   There were 157 patient triggered events which mostly correlated with sinus rhythm with ectopy.  No sustained arrhythmias were noted.  Symptoms included palpitation, chest discomfort.  Impression: Event monitoring showing essentially sinus rhythm with rare PACs and PVCs with no sustained supraventricular ventricular arrhythmias.  Events correlated with sinus rhythm with ectopy.  No definite atrial fibrillation noted.    She has done reasonably well since her previous visit has occasional fluttering in the chest which are few and far between.  She is able to perform her activities of daily living.  Her blood pressure was noted to be elevated in the office today  but she indicates that it is usually in the normal range at home.    Allergies   Allergen Reactions   • Adhesive Tape      Blistering of Skin   • Garlic Other (See Comments)   • Lactose Other (See Comments)   • Propoxyphene Nausea And Vomiting   • Sulfamethoxazole Other (See Comments)   • Trimethoprim Other (See Comments)     Other reaction(s): Other/Unknown (See Comments)   • Nitrofurantoin Macrocrystal Other (See Comments)     Drug Fever  Drug Fever   • Sulfa Antibiotics Rash   • Sulfamethoxazole-Trimethoprim Rash     Other reaction(s): Rash         Past Medical History:   Diagnosis Date   • Acute otitis media 09/10/2015   • Acute pharyngitis 09/10/2015   • Acute sinusitis 09/10/2015   • Biliary colic 10/17/2014    STONES BY REVIEW US   • Breast cyst    • Change in bowel habits 06/28/2016   • Chest rales 9/262015    MEDIUM   • Cholelithiasis without obstruction 10/17/2014   • Chronic cholecystitis 10/17/2014   • Constipation 02/26/2015   • Degenerative joint disease involving multiple joints 09/10/2014   • Elevated blood-pressure reading without diagnosis of hypertension 05/31/2012    BLOOD PRESSURE NORMAL AT HOME   • Epigastric pain 09/10/2014   • Essential hypertension 07/30/2015   • Fibrocystic breast    • Gastritis 02/26/2015   • GERD (gastroesophageal reflux disease) 01/28/2015   • Hammer toe 12/02/2015   • History of colonoscopy 08/28/2014    Information taken from [facility]    • Hyperlipidemia 07/30/2015   • Ingrowing nail [id number]   • Ingrown toenail    • Irritable bowel disease    • Osteoporosis    • Pain in right foot 11/11/2015   • Peripheral neuropathy 09/10/2014   • Plantar fasciitis          Past Surgical History:   Procedure Laterality Date   • ADENOIDECTOMY     • BREAST BIOPSY  11/22/2004    R mammotome biop w/ image guided placement of metallic localization clip & stereotactic location & guidance for breast biop. radiologic examination of the surgical specimen & unilateral mammography   • BREAST CYST  ASPIRATION     • CHOLECYSTECTOMY  10/09/2014   • COLONOSCOPY  08/28/2014    Hemorrhoids found.   • COLONOSCOPY N/A 12/6/2018    Procedure: COLONOSCOPY;  Surgeon: Everardo Beltran DO;  Location: [facility] ENDOSCOPY;  Service: Gastroenterology   • CYST REMOVAL     • DILATATION AND CURETTAGE  08/28/2014    Postmenopausal bleeding   • ENDOSCOPY  08/28/2014    Normal hypopharynx, esophagus. A hiatus hernia found in the GE junction.Non-erosive gastritis found inthe stomach. Multiple biopsies taken.Moderate amount of bile sained fluid in the stomach.Normal, symmetrical, and patent pylorus.Normal duodenum.   • INJECTION OF MEDICATION  07/30/2015    KENALOG(1)   • TONSILLECTOMY           Family History   Problem Relation Age of Onset   • Thyroid disease Mother    • Hypertension Mother    • Obesity Mother    • Osteoporosis Mother    • Stroke Mother    • Prostate cancer Father    • Cancer Father    • Diabetes Father    • Heart attack Father    • Hypertension Father    • Heart disease Father    • Obesity Father    • Cancer Other    • Diabetes Other    • Heart disease Other    • Stroke Other    • Thyroid disease Other    • Lung disease Other    • Arthritis Maternal Grandmother    • Kidney disease Maternal Grandmother    • Stroke Paternal Grandmother    • Diabetes Sister          Social History     Socioeconomic History   • Marital status:    Tobacco Use   • Smoking status: Never   • Smokeless tobacco: Never   Vaping Use   • Vaping Use: Never used   Substance and Sexual Activity   • Alcohol use: No   • Drug use: No   • Sexual activity: Defer         Current Outpatient Medications   Medication Sig Dispense Refill   • Alum Hydroxide-Mag Carbonate (GAVISCON PO) Take  by mouth.     • aspirin 81 MG EC tablet Take 81 mg by mouth 1 (One) Time Per Week.     • B Complex Vitamins (vitamin b complex) capsule capsule Take  by mouth Daily.     • Bismuth Subsalicylate (PEPTO-BISMOL PO) Take  by mouth.     • Calcium Carbonate-Vit D-Min  "(CALTRATE PLUS PO) Take 1 tablet by mouth 2 (Two) Times a Day.     • Cholecalciferol (VITAMIN D3) 5000 units capsule capsule Take 4,000 Units by mouth Daily.     • coenzyme Q10 100 MG capsule Take 100 mg by mouth Daily.     • colestipol (COLESTID) 1 g tablet As Needed.     • denosumab (PROLIA) 60 MG/ML solution prefilled syringe syringe Inject  under the skin into the appropriate area as directed. Every 6 mths     • esomeprazole (nexIUM) 20 MG capsule Take 20 mg by mouth Daily.     • fluticasone (CUTIVATE) 0.05 % cream      • melatonin 1 MG tablet Take  by mouth At Night As Needed for Sleep.     • metoprolol succinate XL (TOPROL-XL) 50 MG 24 hr tablet Take 50 mg by mouth 2 (Two) Times a Day.     • multivitamin with minerals tablet tablet Take 1 tablet by mouth Daily.     • NON FORMULARY 30mg vitamin B-6  400 mcg vitamin B-12  350 mg DHA  150 mg EPA  200 mg L-Carnitine  50mg CO-Q 10  30 mg trans-resveratrol     • Nutritional Supplements (VITEYES TEAR SUPPORT PO) Take  by mouth.     • pantoprazole (PROTONIX) 40 MG EC tablet Take 40 mg by mouth Daily.       No current facility-administered medications for this visit.         OBJECTIVE    /90 (BP Location: Left arm, Patient Position: Sitting, Cuff Size: Adult)   Pulse 80   Ht 157.5 cm (62\")   Wt 74.2 kg (163 lb 8 oz)   SpO2 95%   BMI 29.90 kg/m²         Review of Systems: The following systems are reviewed and no changes noted    Constitutional:  Denies recent weight loss, weight gain, fever or chills     HENT:  Denies any hearing loss, epistaxis, hoarseness, or difficulty speaking.     Eyes: Wears eyeglasses or contact lenses     Respiratory:  Denies dyspnea with exertion,no cough, wheezing, or hemoptysis.     Cardiovascular: Intermittent fluttering the chest, PACs/PVCs    Gastrointestinal:  Denies change in bowel habits, dyspepsia, ulcer disease, hematochezia, or melena.     Endocrine: Negative for cold intolerance, heat intolerance, polydipsia, polyphagia " and polyuria.     Genitourinary: Negative.      Musculoskeletal: Denies any history of arthritic symptoms or back problems     Neurological:  Denies any history of recurrent headaches, strokes, TIA, or seizure disorder.     Hematological: Denies any food allergies, seasonal allergies, bleeding disorders, or lymphadenopathy.     Psychiatric/Behavioral: Denies any history of depression, substance abuse, or change in cognitive function.       Physical Exam: The following systems were reassessed and no changes noted    Constitutional: Cooperative, alert and oriented,  in no acute distress.     HENT:   Head: Normocephalic, normal hair patterns, no masses or tenderness.  Ears, Nose, and Throat: No gross abnormalities. No pallor or cyanosis. Dentition good.   Eyes: EOMS intact, PERRL, conjunctivae and lids unremarkable. Fundoscopic exam and visual fields not performed.   Neck: No palpable masses or adenopathy, no thyromegaly, no JVD, carotid pulses are full and equal bilaterally and without  Bruits.     Cardiovascular: Regular rhythm, S1 and S2 normal, no S3 or S4.  No murmurs, gallops, or rubs detected.     Pulmonary/Chest: Chest: normal symmetry, normal respiratory excursion, no intercostal retraction, no use of accessory muscles.            Pulmonary: Normal breath sounds. No rales or ronchi.    Abdominal: Abdomen soft, bowel sounds normoactive, no masses, no hepatosplenomegaly, non-tender, no bruits.     Musculoskeletal: No deformities, clubbing, cyanosis, erythema, or edema observed.     Neurological: No gross motor or sensory deficits noted, affect appropriate, oriented to time, person, place.     Skin: Warm and dry to the touch, no apparent skin lesions or masses noted.     Psychiatric: She has a normal mood and affect. Her behavior is normal. Judgment and thought content normal.         Procedures      Lab Results   Component Value Date    WBC 6.73 07/14/2017    HGB 14.9 07/14/2017    HCT 44.7 07/14/2017    MCV  92.2 07/14/2017     07/14/2017     Lab Results   Component Value Date    GLUCOSE 95 09/08/2022    BUN 11 09/08/2022    CREATININE 0.74 09/08/2022    EGFRIFNONA 66 10/27/2021    BCR 14.9 09/08/2022    CO2 24.9 09/08/2022    CALCIUM 9.5 09/08/2022    ALBUMIN 4.10 09/08/2022    AST 24 09/08/2022    ALT 24 09/08/2022     Lab Results   Component Value Date    CHOL 213 (H) 10/27/2021    CHOL 203 (H) 10/28/2020    CHOL 201 (H) 10/14/2019     Lab Results   Component Value Date    TRIG 157 (H) 10/27/2021    TRIG 129 10/28/2020    TRIG 144 10/14/2019     Lab Results   Component Value Date    HDL 64 (H) 10/27/2021    HDL 67 (H) 10/28/2020    HDL 58 10/14/2019     No components found for: LDLCALC  Lab Results   Component Value Date     (H) 10/27/2021     (H) 10/28/2020     (H) 10/14/2019     (H) 10/14/2019     No results found for: HDLLDLRATIO  No components found for: CHOLHDL  Lab Results   Component Value Date    HGBA1C 5.6 07/27/2015     Lab Results   Component Value Date    TSH 1.840 03/15/2021           ASSESSMENT AND PLAN  Robert Larios is a 77-year-old female with history of hypertension, mild hyperlipidemia, with intermittent skipped beats going back several months, due to PACs/PVCs with no sustained arrhythmias.  She has no previous documented coronary artery disease.  She has progressed quite well and her symptoms are few and far between.  The increase in the dose of beta-blocker seems to have helped.    Her lab results from September 2022 were reviewed and CMP was within normal limits.  After reviewing her medicines, no changes have been made and metoprolol succinate 50 mg in a.m. and 25 mg in p.m. has been continued.  Antiplatelet therapy with aspirin has been continued.    I have advised her to keep a close watch on her blood pressure.  She indicates that she has whitecoat syndrome and this could be responsible.    Diagnoses and all orders for this visit:    1. Palpitation  (Primary)    2. Essential hypertension    3. Mixed hyperlipidemia        Patient's Body mass index is 29.9 kg/m². BMI is above normal parameters. Recommendations include: exercise counseling and nutrition counseling.      Robert Larios  reports that she has never smoked. She has never used smokeless tobacco..          Peggy Govea MD  10/17/2022  10:39 CDT

## 2022-10-31 PROCEDURE — 87635 SARS-COV-2 COVID-19 AMP PRB: CPT | Performed by: NURSE PRACTITIONER

## 2022-11-16 ENCOUNTER — TELEPHONE (OUTPATIENT)
Dept: FAMILY MEDICINE CLINIC | Facility: CLINIC | Age: 77
End: 2022-11-16

## 2022-11-16 DIAGNOSIS — I10 ESSENTIAL HYPERTENSION: Primary | Chronic | ICD-10-CM

## 2022-11-16 DIAGNOSIS — E78.2 MIXED HYPERLIPIDEMIA: Chronic | ICD-10-CM

## 2022-11-16 NOTE — TELEPHONE ENCOUNTER
Information relayed,  She said she is suppose to have fasting labs completed.  She will probably not be able to have those done until Monday morning.     She states she needs a Urinalysis and Lipid Panel added to the active orders that are already in.     SATHISH Rebolledo

## 2022-11-16 NOTE — TELEPHONE ENCOUNTER
Pt called, tested Covid positive with at home test, this morning .. she has appointment with Dr Shah on 11/21/2022 - Her symptoms include: sinus drainage, cough, sore throat. Her symptoms started Sunday-ish, and are starting to resolve, but she would still appreciate a phone call back, as to what to do going forward.    Does she keep her appt on 11/21 or reschedule. Pos Covid 11/16/2022.    Callback number is [phone]  Robert Larios.

## 2022-11-21 ENCOUNTER — OFFICE VISIT (OUTPATIENT)
Dept: FAMILY MEDICINE CLINIC | Facility: CLINIC | Age: 77
End: 2022-11-21

## 2022-11-21 ENCOUNTER — LAB (OUTPATIENT)
Dept: LAB | Facility: HOSPITAL | Age: 77
End: 2022-11-21

## 2022-11-21 VITALS
DIASTOLIC BLOOD PRESSURE: 70 MMHG | WEIGHT: 158.9 LBS | BODY MASS INDEX: 29.24 KG/M2 | HEART RATE: 79 BPM | SYSTOLIC BLOOD PRESSURE: 130 MMHG | HEIGHT: 62 IN | OXYGEN SATURATION: 98 %

## 2022-11-21 DIAGNOSIS — I10 ESSENTIAL HYPERTENSION: Chronic | ICD-10-CM

## 2022-11-21 DIAGNOSIS — M81.0 AGE-RELATED OSTEOPOROSIS WITHOUT CURRENT PATHOLOGICAL FRACTURE: ICD-10-CM

## 2022-11-21 DIAGNOSIS — E78.2 MIXED HYPERLIPIDEMIA: Chronic | ICD-10-CM

## 2022-11-21 DIAGNOSIS — Z12.31 ENCOUNTER FOR SCREENING MAMMOGRAM FOR BREAST CANCER: ICD-10-CM

## 2022-11-21 DIAGNOSIS — Z00.00 MEDICARE ANNUAL WELLNESS VISIT, SUBSEQUENT: Primary | ICD-10-CM

## 2022-11-21 LAB
25(OH)D3 SERPL-MCNC: 55.8 NG/ML (ref 30–100)
ALBUMIN SERPL-MCNC: 4.2 G/DL (ref 3.5–5.2)
ALBUMIN/GLOB SERPL: 1.6 G/DL
ALP SERPL-CCNC: 48 U/L (ref 39–117)
ALT SERPL W P-5'-P-CCNC: 25 U/L (ref 1–33)
ANION GAP SERPL CALCULATED.3IONS-SCNC: 12 MMOL/L (ref 5–15)
AST SERPL-CCNC: 30 U/L (ref 1–32)
BILIRUB SERPL-MCNC: 0.4 MG/DL (ref 0–1.2)
BUN SERPL-MCNC: 10 MG/DL (ref 8–23)
BUN/CREAT SERPL: 12.5 (ref 7–25)
CALCIUM SPEC-SCNC: 9.4 MG/DL (ref 8.6–10.5)
CHLORIDE SERPL-SCNC: 104 MMOL/L (ref 98–107)
CHOLEST SERPL-MCNC: 191 MG/DL (ref 0–200)
CO2 SERPL-SCNC: 25 MMOL/L (ref 22–29)
CREAT SERPL-MCNC: 0.8 MG/DL (ref 0.57–1)
EGFRCR SERPLBLD CKD-EPI 2021: 76.5 ML/MIN/1.73
GLOBULIN UR ELPH-MCNC: 2.7 GM/DL
GLUCOSE SERPL-MCNC: 108 MG/DL (ref 65–99)
HDLC SERPL-MCNC: 53 MG/DL (ref 40–60)
LDLC SERPL CALC-MCNC: 113 MG/DL (ref 0–100)
LDLC/HDLC SERPL: 2.06 {RATIO}
MAGNESIUM SERPL-MCNC: 2.1 MG/DL (ref 1.6–2.4)
PHOSPHATE SERPL-MCNC: 2.8 MG/DL (ref 2.5–4.5)
POTASSIUM SERPL-SCNC: 3.7 MMOL/L (ref 3.5–5.2)
PROT SERPL-MCNC: 6.9 G/DL (ref 6–8.5)
SODIUM SERPL-SCNC: 141 MMOL/L (ref 136–145)
TRIGL SERPL-MCNC: 143 MG/DL (ref 0–150)
VLDLC SERPL-MCNC: 25 MG/DL (ref 5–40)

## 2022-11-21 PROCEDURE — 82306 VITAMIN D 25 HYDROXY: CPT

## 2022-11-21 PROCEDURE — 36415 COLL VENOUS BLD VENIPUNCTURE: CPT

## 2022-11-21 PROCEDURE — 1170F FXNL STATUS ASSESSED: CPT | Performed by: GENERAL PRACTICE

## 2022-11-21 PROCEDURE — 80053 COMPREHEN METABOLIC PANEL: CPT

## 2022-11-21 PROCEDURE — 1159F MED LIST DOCD IN RCRD: CPT | Performed by: GENERAL PRACTICE

## 2022-11-21 PROCEDURE — 1126F AMNT PAIN NOTED NONE PRSNT: CPT | Performed by: GENERAL PRACTICE

## 2022-11-21 PROCEDURE — 80061 LIPID PANEL: CPT

## 2022-11-21 PROCEDURE — 81003 URINALYSIS AUTO W/O SCOPE: CPT

## 2022-11-21 PROCEDURE — G0439 PPPS, SUBSEQ VISIT: HCPCS | Performed by: GENERAL PRACTICE

## 2022-11-21 NOTE — PATIENT INSTRUCTIONS
Check at pharmacy on Shingrix shingles vaccine    Check on tetanus/pertussis vaccine at pharmacy or with insurance.      Medicare Wellness  Personal Prevention Plan of Service     Date of Office Visit:    Encounter Provider:  Ida Shah MD  Place of Service:  [facility]  Patient Name: Robert Larios  :  1945    As part of the Medicare Wellness portion of your visit today, we are providing you with this personalized preventive plan of services (PPPS). This plan is based upon recommendations of the [place] Preventive Services Task Force (USPSTF) and the Advisory Committee on Immunization Practices (ACIP).    This lists the preventive care services that should be considered, and provides dates of when you are due. Items listed as completed are up-to-date and do not require any further intervention.    Health Maintenance   Topic Date Due    ZOSTER VACCINE (2 of 2) 2017    TDAP/TD VACCINES (2 - Td or Tdap) 2018    LIPID PANEL  10/27/2022    ANNUAL WELLNESS VISIT  2022    MAMMOGRAM  2023    DXA SCAN  2023    COLORECTAL CANCER SCREENING  2028    HEPATITIS C SCREENING  Completed    COVID-19 Vaccine  Completed    INFLUENZA VACCINE  Completed    Pneumococcal Vaccine 65+  Completed       No orders of the defined types were placed in this encounter.      No follow-ups on file.

## 2022-11-21 NOTE — PROGRESS NOTES
The ABCs of the Annual Wellness Visit  Subsequent Medicare Wellness Visit    Chief Complaint   Patient presents with   • Annual Exam     roverto   • Medicare Wellness-subsequent     roverto      Subjective    History of Present Illness:  Robert Larios is a 77 y.o. female who presents for a Subsequent Medicare Wellness Visit. Due for mammogram.      The following portions of the patient's history were reviewed and   updated as appropriate: allergies, current medications, past family history, past medical history, past social history, past surgical history and problem list.    Compared to one year ago, the patient feels her physical   health is the same.    Compared to one year ago, the patient feels her mental   health is the same.    Recent Hospitalizations:  She was not admitted to the hospital during the last year.       Current Medical Providers:  Patient Care Team:  Ida Shah MD as PCP - General (Family Medicine)  Everardo Beltran DO as Consulting Physician (Gastroenterology)  Naveed Kim MD as Consulting Physician (Ophthalmology)  Peggy Govea MD as Consulting Physician (Cardiology)  Elmer Chin III, MD (Dermatology)    Outpatient Medications Prior to Visit   Medication Sig Dispense Refill   • Alum Hydroxide-Mag Carbonate (GAVISCON PO) Take  by mouth.     • aspirin 81 MG EC tablet Take 81 mg by mouth 1 (One) Time Per Week.     • B Complex Vitamins (vitamin b complex) capsule capsule Take  by mouth Daily.     • Bismuth Subsalicylate (PEPTO-BISMOL PO) Take  by mouth.     • Calcium Carbonate-Vit D-Min (CALTRATE PLUS PO) Take 1 tablet by mouth 2 (Two) Times a Day.     • Cholecalciferol (VITAMIN D3) 5000 units capsule capsule Take 4,000 Units by mouth Daily.     • coenzyme Q10 100 MG capsule Take 100 mg by mouth Daily.     • colestipol (COLESTID) 1 g tablet As Needed.     • denosumab (PROLIA) 60 MG/ML solution prefilled syringe syringe Inject  under the skin into the  appropriate area as directed. Every 6 mths     • esomeprazole (nexIUM) 20 MG capsule Take 20 mg by mouth Daily.     • fluticasone (CUTIVATE) 0.05 % cream      • melatonin 1 MG tablet Take  by mouth At Night As Needed for Sleep.     • metoprolol succinate XL (TOPROL-XL) 50 MG 24 hr tablet Take 50 mg by mouth 2 (Two) Times a Day.     • multivitamin with minerals tablet tablet Take 1 tablet by mouth Daily.     • NON FORMULARY 30mg vitamin B-6  400 mcg vitamin B-12  350 mg DHA  150 mg EPA  200 mg L-Carnitine  50mg CO-Q 10  30 mg trans-resveratrol     • Nutritional Supplements (VITEYES TEAR SUPPORT PO) Take  by mouth.     • pantoprazole (PROTONIX) 40 MG EC tablet Take 40 mg by mouth Daily.       No facility-administered medications prior to visit.       No opioid medication identified on active medication list. I have reviewed chart for other potential  high risk medication/s and harmful drug interactions in the elderly.          Aspirin is on active medication list. Aspirin use is indicated based on review of current medical condition/s. Pros and cons of this therapy have been discussed today. Benefits of this medication outweigh potential harm.  Patient has been encouraged to continue taking this medication.  .      Patient Active Problem List   Diagnosis   • Hyperlipidemia   • GERD (gastroesophageal reflux disease)   • Essential hypertension   • Degenerative joint disease involving multiple joints   • Constipation   • Nuclear cataract   • Episcleritis   • Corneal ulcer, marginal   • Palpitation   • Abnormal EKG   • Dizziness   • Precordial pain   • Osteoporosis   • Irritable bowel syndrome   • Chronic pain of both shoulders   • Rotator cuff syndrome of both shoulders   • Chronic right-sided low back pain with right-sided sciatica   • Chronic right SI joint pain   • DDD (degenerative disc disease), lumbar   • Facet arthropathy, lumbar   • Tethered cord (HCC)   • Subdeltoid bursitis of left shoulder joint   •  "Impingement syndrome of right shoulder   • Nontraumatic incomplete tear of right rotator cuff   • Bilateral acromioclavicular joint arthritis   • Impingement syndrome of left shoulder   • Left hand pain     Advance Care Planning  Advance Directive is on file.  ACP discussion was held with the patient during this visit. Patient has an advance directive in EMR which is still valid.           Objective    Vitals:    11/21/22 0930   BP: 130/70   Pulse: 79   SpO2: 98%   Weight: 72.1 kg (158 lb 14.4 oz)   Height: 157.5 cm (62\")   PainSc: 0-No pain     Estimated body mass index is 29.06 kg/m² as calculated from the following:    Height as of this encounter: 157.5 cm (62\").    Weight as of this encounter: 72.1 kg (158 lb 14.4 oz).    BMI is >= 25 and <30. (Overweight) The following options were offered after discussion;: exercise counseling/recommendations and nutrition counseling/recommendations      Does the patient have evidence of cognitive impairment? No    Physical Exam  Vitals and nursing note reviewed.   Constitutional:       General: She is not in acute distress.     Appearance: She is well-developed.   HENT:      Head: Normocephalic and atraumatic.      Nose: Nose normal.   Eyes:      General:         Right eye: No discharge.         Left eye: No discharge.      Conjunctiva/sclera: Conjunctivae normal.      Pupils: Pupils are equal, round, and reactive to light.   Neck:      Thyroid: No thyromegaly.      Trachea: No tracheal deviation.   Cardiovascular:      Rate and Rhythm: Normal rate and regular rhythm.      Heart sounds: Normal heart sounds. No murmur heard.  Pulmonary:      Effort: Pulmonary effort is normal. No respiratory distress.      Breath sounds: Normal breath sounds. No wheezing or rales.   Chest:      Chest wall: No tenderness.   Breasts:     Right: No inverted nipple, mass, nipple discharge, skin change or tenderness.      Left: No inverted nipple, mass, nipple discharge, skin change or tenderness. "   Abdominal:      General: Bowel sounds are normal. There is no distension.      Palpations: Abdomen is soft. There is no mass.      Tenderness: There is no abdominal tenderness.      Hernia: No hernia is present.   Musculoskeletal:         General: No deformity. Normal range of motion.   Lymphadenopathy:      Cervical: No cervical adenopathy.   Skin:     General: Skin is warm and dry.   Neurological:      Mental Status: She is alert and oriented to person, place, and time.      Deep Tendon Reflexes: Reflexes are normal and symmetric.   Psychiatric:         Behavior: Behavior normal.         Thought Content: Thought content normal.         Judgment: Judgment normal.       HEALTH RISK ASSESSMENT    Smoking Status:  Social History     Tobacco Use   Smoking Status Never   Smokeless Tobacco Never     Alcohol Consumption:  Social History     Substance and Sexual Activity   Alcohol Use No     Fall Risk Screen:    Critical access hospital Fall Risk Assessment was completed, and patient is at MODERATE risk for falls. Assessment completed on:11/21/2022    Depression Screening:  PHQ-2/PHQ-9 Depression Screening 11/21/2022   Retired PHQ-9 Total Score -   Retired Total Score -   Little Interest or Pleasure in Doing Things 0-->not at all   Feeling Down, Depressed or Hopeless 0-->not at all   PHQ-9: Brief Depression Severity Measure Score 0       Health Habits and Functional and Cognitive Screening:  Functional & Cognitive Status 11/21/2022   Do you have difficulty preparing food and eating? No   Do you have difficulty bathing yourself, getting dressed or grooming yourself? No   Do you have difficulty using the toilet? No   Do you have difficulty moving around from place to place? No   Do you have trouble with steps or getting out of a bed or a chair? No   Current Diet Well Balanced Diet   Dental Exam Up to date   Eye Exam Up to date   Exercise (times per week) 3 times per week   Current Exercises Include Walking        Exercise Comment -    Current Exercise Activities Include -   Do you need help using the phone?  No   Are you deaf or do you have serious difficulty hearing?  No   Do you need help with transportation? No   Do you need help shopping? No   Do you need help preparing meals?  No   Do you need help with housework?  No   Do you need help with laundry? No   Do you need help taking your medications? No   Do you need help managing money? No   Do you ever drive or ride in a car without wearing a seat belt? No   Have you felt unusual stress, anger or loneliness in the last month? No   Who do you live with? Spouse   If you need help, do you have trouble finding someone available to you? No   Have you been bothered in the last four weeks by sexual problems? No   Do you have difficulty concentrating, remembering or making decisions? No       Age-appropriate Screening Schedule:  Refer to the list below for future screening recommendations based on patient's age, sex and/or medical conditions. Orders for these recommended tests are listed in the plan section. The patient has been provided with a written plan.    Health Maintenance   Topic Date Due   • ZOSTER VACCINE (2 of 2) 09/28/2017   • TDAP/TD VACCINES (2 - Td or Tdap) 07/11/2018   • LIPID PANEL  10/27/2022   • MAMMOGRAM  11/03/2023   • DXA SCAN  11/03/2023   • INFLUENZA VACCINE  Completed              Assessment & Plan   CMS Preventative Services Quick Reference  Risk Factors Identified During Encounter  Fall Risk-High or Moderate  Immunizations Discussed/Encouraged (specific Immunizations; Tdap and Shingrix  Obesity/Overweight   The above risks/problems have been discussed with the patient.  Follow up actions/plans if indicated are seen below in the Assessment/Plan Section.  Pertinent information has been shared with the patient in the After Visit Summary.  Taking the Dr. Shah 20 was seen    Diagnoses and all orders for this visit:    1. Medicare annual wellness visit, subsequent  (Primary)    2. Encounter for screening mammogram for breast cancer    3. Age-related osteoporosis without current pathological fracture        Follow Up:   Return in about 1 year (around 11/21/2023) for medicare wellness visit, Annual physical.   Will notify regarding results. Continue current medications.     An After Visit Summary and PPPS were made available to the patient.  Information has been scanned into chart. Discussed importance of taking medications as prescribed. Encouraged healthy eating habits with low fat, low salt choices and working towards maintaining a healthy weight. Recommended regular exercise if able as well as care to prevent falls including avoiding anything on the floor that they could slip or trip on such as throw rugs, making sure they have a bathmat to step onto when their feet are wet and having grab bars and railings where needed.

## 2022-12-02 ENCOUNTER — TELEPHONE (OUTPATIENT)
Dept: FAMILY MEDICINE CLINIC | Facility: CLINIC | Age: 77
End: 2022-12-02

## 2022-12-02 NOTE — TELEPHONE ENCOUNTER
Per Dr. Shah, Ms Larios has been called with recent Left Diagnostic Mammogram results & recommendations.  Continue current medications and follow-up as planned or sooner if any problems.       ----- Message from Ida Shah MD sent at 12/1/2022  4:29 PM CST -----  Call and tell repeat mammogram ok, just need to do next year as usual

## 2023-02-02 DIAGNOSIS — Z12.31 ENCOUNTER FOR SCREENING MAMMOGRAM FOR BREAST CANCER: Primary | ICD-10-CM

## 2023-02-02 DIAGNOSIS — Z78.0 POSTMENOPAUSAL: ICD-10-CM

## 2023-03-13 ENCOUNTER — LAB (OUTPATIENT)
Dept: LAB | Facility: HOSPITAL | Age: 78
End: 2023-03-13
Payer: MEDICARE

## 2023-03-13 DIAGNOSIS — M81.0 AGE-RELATED OSTEOPOROSIS WITHOUT CURRENT PATHOLOGICAL FRACTURE: ICD-10-CM

## 2023-03-13 LAB
ALBUMIN SERPL-MCNC: 4.2 G/DL (ref 3.5–5.2)
ALBUMIN/GLOB SERPL: 1.6 G/DL
ALP SERPL-CCNC: 45 U/L (ref 39–117)
ALT SERPL W P-5'-P-CCNC: 23 U/L (ref 1–33)
ANION GAP SERPL CALCULATED.3IONS-SCNC: 11 MMOL/L (ref 5–15)
AST SERPL-CCNC: 20 U/L (ref 1–32)
BILIRUB SERPL-MCNC: 0.4 MG/DL (ref 0–1.2)
BUN SERPL-MCNC: 16 MG/DL (ref 8–23)
BUN/CREAT SERPL: 21.6 (ref 7–25)
CALCIUM SPEC-SCNC: 9.4 MG/DL (ref 8.6–10.5)
CHLORIDE SERPL-SCNC: 102 MMOL/L (ref 98–107)
CO2 SERPL-SCNC: 26 MMOL/L (ref 22–29)
CREAT SERPL-MCNC: 0.74 MG/DL (ref 0.57–1)
EGFRCR SERPLBLD CKD-EPI 2021: 84 ML/MIN/1.73
GLOBULIN UR ELPH-MCNC: 2.6 GM/DL
GLUCOSE SERPL-MCNC: 100 MG/DL (ref 65–99)
MAGNESIUM SERPL-MCNC: 2.4 MG/DL (ref 1.6–2.4)
PHOSPHATE SERPL-MCNC: 3.6 MG/DL (ref 2.5–4.5)
POTASSIUM SERPL-SCNC: 3.8 MMOL/L (ref 3.5–5.2)
PROT SERPL-MCNC: 6.8 G/DL (ref 6–8.5)
SODIUM SERPL-SCNC: 139 MMOL/L (ref 136–145)

## 2023-03-13 PROCEDURE — 84100 ASSAY OF PHOSPHORUS: CPT

## 2023-03-13 PROCEDURE — 80053 COMPREHEN METABOLIC PANEL: CPT

## 2023-03-13 PROCEDURE — 83735 ASSAY OF MAGNESIUM: CPT

## 2023-03-20 ENCOUNTER — INFUSION (OUTPATIENT)
Dept: ONCOLOGY | Facility: HOSPITAL | Age: 78
End: 2023-03-20
Payer: MEDICARE

## 2023-03-20 VITALS
RESPIRATION RATE: 18 BRPM | HEART RATE: 78 BPM | TEMPERATURE: 97.9 F | DIASTOLIC BLOOD PRESSURE: 71 MMHG | SYSTOLIC BLOOD PRESSURE: 159 MMHG

## 2023-03-20 DIAGNOSIS — M81.0 AGE-RELATED OSTEOPOROSIS WITHOUT CURRENT PATHOLOGICAL FRACTURE: Primary | ICD-10-CM

## 2023-03-20 PROCEDURE — 96372 THER/PROPH/DIAG INJ SC/IM: CPT | Performed by: NURSE PRACTITIONER

## 2023-03-20 PROCEDURE — 25010000002 DENOSUMAB 60 MG/ML SOLUTION PREFILLED SYRINGE: Performed by: GENERAL PRACTICE

## 2023-03-20 RX ADMIN — DENOSUMAB 60 MG: 60 INJECTION SUBCUTANEOUS at 14:54

## 2023-04-17 ENCOUNTER — OFFICE VISIT (OUTPATIENT)
Dept: CARDIOLOGY | Facility: CLINIC | Age: 78
End: 2023-04-17
Payer: MEDICARE

## 2023-04-17 VITALS
HEIGHT: 62 IN | SYSTOLIC BLOOD PRESSURE: 122 MMHG | WEIGHT: 160 LBS | HEART RATE: 71 BPM | DIASTOLIC BLOOD PRESSURE: 74 MMHG | OXYGEN SATURATION: 98 % | BODY MASS INDEX: 29.44 KG/M2 | TEMPERATURE: 96.4 F

## 2023-04-17 DIAGNOSIS — R00.2 PALPITATION: ICD-10-CM

## 2023-04-17 DIAGNOSIS — I10 ESSENTIAL HYPERTENSION: Primary | ICD-10-CM

## 2023-04-17 DIAGNOSIS — E78.2 MIXED HYPERLIPIDEMIA: Chronic | ICD-10-CM

## 2023-04-17 LAB
QT INTERVAL: 418 MS
QTC INTERVAL: 454 MS

## 2023-04-17 PROCEDURE — 3078F DIAST BP <80 MM HG: CPT | Performed by: INTERNAL MEDICINE

## 2023-04-17 PROCEDURE — 1160F RVW MEDS BY RX/DR IN RCRD: CPT | Performed by: INTERNAL MEDICINE

## 2023-04-17 PROCEDURE — 1159F MED LIST DOCD IN RCRD: CPT | Performed by: INTERNAL MEDICINE

## 2023-04-17 PROCEDURE — 93000 ELECTROCARDIOGRAM COMPLETE: CPT | Performed by: INTERNAL MEDICINE

## 2023-04-17 PROCEDURE — 3074F SYST BP LT 130 MM HG: CPT | Performed by: INTERNAL MEDICINE

## 2023-04-17 PROCEDURE — 99214 OFFICE O/P EST MOD 30 MIN: CPT | Performed by: INTERNAL MEDICINE

## 2023-04-17 RX ORDER — METOPROLOL SUCCINATE 50 MG/1
50 TABLET, EXTENDED RELEASE ORAL 2 TIMES DAILY
Qty: 90 TABLET | Refills: 3 | Status: SHIPPED | OUTPATIENT
Start: 2023-04-17

## 2023-04-17 NOTE — PROGRESS NOTES
Robert Larios  77 y.o. female      1. Essential hypertension    2. Mixed hyperlipidemia    3. Palpitation        History of Present Illness:  Robert Larios is a 77-year-old female with history of intermittent fluttering in the chest most likely due to PACs/PVCs. No sustained arrhythmias have been documented thus far. She has had history of hypertension, hyperlipidemia.    He has no previous documented coronary artery disease and CT of the coronary arteries in July 2017 showed EF of 53% and no CAD.    Echocardiogram on 4/3/2021 showed:  · Estimated left ventricular EF = 65% Left ventricular ejection fraction appears to be 61 - 65%. Left ventricular systolic function is normal.  · Left ventricular diastolic function is consistent with (grade I) impaired relaxation.  · Estimated right ventricular systolic pressure from tricuspid regurgitation is normal (<35 mmHg).    Event monitoring in 4/ 2021 showed:  Result: Underlying rhythm was sinus with heart rate ranging from 56 bpm to 102 bpm with an average heart rate of 80 bpm.  There were 380 PVCs which represented less than 1% of total beats.  There where 1002 PACs which also represented less than 1% of total beats.  There were 11 short nonsustained runs of PACs.  No definite atrial fibrillation was noted   There were 157 patient triggered events which mostly correlated with sinus rhythm with ectopy.  No sustained arrhythmias were noted.  Symptoms included palpitation, chest discomfort.  Impression: Event monitoring showing essentially sinus rhythm with rare PACs and PVCs with no sustained supraventricular ventricular arrhythmias.  Events correlated with sinus rhythm with ectopy.  No definite atrial fibrillation noted.    She has progressed reasonably well and palpitations a few and far between.  She denies any dyspnea.  She has occasional chest pain lasting a second unrelated to ectopic beats.    EKG today showed sinus rhythm with heart rate of 71 bpm minimal  nonspecific T wave changes.  QTc interval 454 ms.    Allergies   Allergen Reactions   • Adhesive Tape      Blistering of Skin   • Garlic Other (See Comments)   • Lactose Other (See Comments)   • Propoxyphene Nausea And Vomiting   • Sulfamethoxazole Other (See Comments)   • Trimethoprim Other (See Comments)     Other reaction(s): Other/Unknown (See Comments)   • Nitrofurantoin Macrocrystal Other (See Comments)     Drug Fever  Drug Fever   • Sulfa Antibiotics Rash   • Sulfamethoxazole-Trimethoprim Rash     Other reaction(s): Rash         Past Medical History:   Diagnosis Date   • Acute otitis media 09/10/2015   • Acute pharyngitis 09/10/2015   • Acute sinusitis 09/10/2015   • Biliary colic 10/17/2014    STONES BY REVIEW US   • Breast cyst    • Change in bowel habits 06/28/2016   • Chest rales 9/262015    MEDIUM   • Cholelithiasis without obstruction 10/17/2014   • Chronic cholecystitis 10/17/2014   • Constipation 02/26/2015   • Degenerative joint disease involving multiple joints 09/10/2014   • Elevated blood-pressure reading without diagnosis of hypertension 05/31/2012    BLOOD PRESSURE NORMAL AT HOME   • Epigastric pain 09/10/2014   • Essential hypertension 07/30/2015   • Fibrocystic breast    • Gastritis 02/26/2015   • GERD (gastroesophageal reflux disease) 01/28/2015   • Hammer toe 12/02/2015   • History of colonoscopy 08/28/2014    Information [facility]    • Hyperlipidemia 07/30/2015   • Ingrowing nail [id number]   • Ingrown toenail    • Irritable bowel disease    • Osteoporosis    • Pain in right foot 11/11/2015   • Peripheral neuropathy 09/10/2014   • Plantar fasciitis          Past Surgical History:   Procedure Laterality Date   • ADENOIDECTOMY     • BREAST BIOPSY  11/22/2004    R mammotome biop w/ image guided placement of metallic localization clip & stereotactic location & guidance for breast biop. radiologic examination of the surgical specimen & unilateral mammography   • BREAST CYST ASPIRATION     •  CHOLECYSTECTOMY  10/09/2014   • COLONOSCOPY  08/28/2014    Hemorrhoids found.   • COLONOSCOPY N/A 12/6/2018    Procedure: COLONOSCOPY;  Surgeon: Everardo Beltran DO;  Location: [facility] ENDOSCOPY;  Service: Gastroenterology   • CYST REMOVAL     • DILATATION AND CURETTAGE  08/28/2014    Postmenopausal bleeding   • ENDOSCOPY  08/28/2014    Normal hypopharynx, esophagus. A hiatus hernia found in the GE junction.Non-erosive gastritis found inthe stomach. Multiple biopsies taken.Moderate amount of bile sained fluid in the stomach.Normal, symmetrical, and patent pylorus.Normal duodenum.   • INJECTION OF MEDICATION  07/30/2015    KENALOG(1)   • TONSILLECTOMY           Family History   Problem Relation Age of Onset   • Thyroid disease Mother    • Hypertension Mother    • Obesity Mother    • Osteoporosis Mother    • Stroke Mother    • Prostate cancer Father    • Cancer Father    • Diabetes Father    • Heart attack Father    • Hypertension Father    • Heart disease Father    • Obesity Father    • Cancer Other    • Diabetes Other    • Heart disease Other    • Stroke Other    • Thyroid disease Other    • Lung disease Other    • Arthritis Maternal Grandmother    • Kidney disease Maternal Grandmother    • Stroke Paternal Grandmother    • Diabetes Sister          Social History     Socioeconomic History   • Marital status:    Tobacco Use   • Smoking status: Never   • Smokeless tobacco: Never   Vaping Use   • Vaping Use: Never used   Substance and Sexual Activity   • Alcohol use: No   • Drug use: No   • Sexual activity: Defer         Current Outpatient Medications   Medication Sig Dispense Refill   • Alum Hydroxide-Mag Carbonate (GAVISCON PO) Take  by mouth.     • aspirin 81 MG EC tablet Take 1 tablet by mouth 1 (One) Time Per Week.     • B Complex Vitamins (vitamin b complex) capsule capsule Take  by mouth Daily.     • Bismuth Subsalicylate (PEPTO-BISMOL PO) Take  by mouth.     • Calcium Carbonate-Vit D-Min (CALTRATE PLUS  "PO) Take 1 tablet by mouth 2 (Two) Times a Day.     • Cholecalciferol (VITAMIN D3) 5000 units capsule capsule Take 4,000 Units by mouth Daily.     • coenzyme Q10 100 MG capsule Take 1 capsule by mouth Daily.     • colestipol (COLESTID) 1 g tablet As Needed.     • denosumab (PROLIA) 60 MG/ML solution prefilled syringe syringe Inject  under the skin into the appropriate area as directed. Every 6 mths     • esomeprazole (nexIUM) 20 MG capsule Take 1 capsule by mouth Daily.     • fluticasone (CUTIVATE) 0.05 % cream      • melatonin 1 MG tablet Take  by mouth At Night As Needed for Sleep.     • metoprolol succinate XL (TOPROL-XL) 50 MG 24 hr tablet Take 1 tablet by mouth 2 (Two) Times a Day. 90 tablet 3   • multivitamin with minerals tablet tablet Take 1 tablet by mouth Daily.     • NON FORMULARY 30mg vitamin B-6  400 mcg vitamin B-12  350 mg DHA  150 mg EPA  200 mg L-Carnitine  50mg CO-Q 10  30 mg trans-resveratrol     • Nutritional Supplements (VITEYES TEAR SUPPORT PO) Take  by mouth.     • pantoprazole (PROTONIX) 40 MG EC tablet Take 1 tablet by mouth Daily.       No current facility-administered medications for this visit.         OBJECTIVE    /74 (BP Location: Left arm, Patient Position: Sitting, Cuff Size: Adult)   Pulse 71   Temp 96.4 °F (35.8 °C)   Ht 157.5 cm (62\")   Wt 72.6 kg (160 lb)   SpO2 98%   BMI 29.26 kg/m²         Review of Systems: The following systems are reviewed and no changes noted    Constitutional:  Denies recent weight loss, weight gain, fever or chills     HENT:  Denies any hearing loss, epistaxis, hoarseness, or difficulty speaking.     Eyes: Wears eyeglasses or contact lenses     Respiratory:  Denies dyspnea with exertion,no cough, wheezing, or hemoptysis.     Cardiovascular: Rare fluttering the chest    Gastrointestinal:  Denies change in bowel habits, dyspepsia, ulcer disease, hematochezia, or melena.     Endocrine: Negative for cold intolerance, heat intolerance, polydipsia, " polyphagia and polyuria.     Genitourinary: Negative.      Musculoskeletal: Denies any history of arthritic symptoms or back problems     Neurological:  Denies any history of recurrent headaches, strokes, TIA, or seizure disorder.     Hematological: Denies any food allergies, seasonal allergies, bleeding disorders, or lymphadenopathy.     Psychiatric/Behavioral: Denies any history of depression, substance abuse, or change in cognitive function.       Physical Exam: The following systems were reassessed and no changes noted    Constitutional: Cooperative, alert and oriented,  in no acute distress.     HENT:   Head: Normocephalic, normal hair patterns, no masses or tenderness.  Ears, Nose, and Throat: No gross abnormalities. No pallor or cyanosis. Dentition good.   Eyes: EOMS intact, PERRL, conjunctivae and lids unremarkable. Fundoscopic exam and visual fields not performed.   Neck: No palpable masses or adenopathy, no thyromegaly, no JVD, carotid pulses are full and equal bilaterally and without  Bruits.     Cardiovascular: Regular rhythm, S1 and S2 normal, no S3 or S4.  No murmurs, gallops, or rubs detected.     Pulmonary/Chest: Chest: normal symmetry, normal respiratory excursion, no intercostal retraction, no use of accessory muscles.            Pulmonary: Normal breath sounds. No rales or ronchi.    Abdominal: Abdomen soft, bowel sounds normoactive, no masses, no hepatosplenomegaly, non-tender, no bruits.     Musculoskeletal: No deformities, clubbing, cyanosis, erythema, or edema observed.     Neurological: No gross motor or sensory deficits noted, affect appropriate, oriented to time, person, place.     Skin: Warm and dry to the touch, no apparent skin lesions or masses noted.     Psychiatric: She has a normal mood and affect. Her behavior is normal. Judgment and thought content normal.         Procedures      Lab Results   Component Value Date    WBC 6.73 07/14/2017    HGB 14.9 07/14/2017    HCT 44.7 07/14/2017     MCV 92.2 07/14/2017     07/14/2017     Lab Results   Component Value Date    GLUCOSE 100 (H) 03/13/2023    BUN 16 03/13/2023    CREATININE 0.74 03/13/2023    EGFRIFNONA 66 10/27/2021    BCR 21.6 03/13/2023    CO2 26.0 03/13/2023    CALCIUM 9.4 03/13/2023    ALBUMIN 4.2 03/13/2023    AST 20 03/13/2023    ALT 23 03/13/2023     Lab Results   Component Value Date    CHOL 191 11/21/2022    CHOL 213 (H) 10/27/2021    CHOL 203 (H) 10/28/2020     Lab Results   Component Value Date    TRIG 143 11/21/2022    TRIG 157 (H) 10/27/2021    TRIG 129 10/28/2020     Lab Results   Component Value Date    HDL 53 11/21/2022    HDL 64 (H) 10/27/2021    HDL 67 (H) 10/28/2020     No components found for: LDLCALC  Lab Results   Component Value Date     (H) 11/21/2022     (H) 10/27/2021     (H) 10/28/2020     No results found for: HDLLDLRATIO  No components found for: CHOLHDL  Lab Results   Component Value Date    HGBA1C 5.6 07/27/2015     Lab Results   Component Value Date    TSH 1.840 03/15/2021           ASSESSMENT AND PLAN  Robert Larios is a 77-year-old female with history of hypertension, mild hyperlipidemia, with intermittent skipped beats going back several months, due to PACs/PVCs with no sustained arrhythmias.  She has no previous documented coronary artery disease.  Her symptoms are few and far between and she has done well with metoprolol succinate.    Her blood pressure was in the normal range.  No signs of congestive heart failure.  Her LDL was 113 and HDL 53 in November 2022.  CMP was within normal limits in March 2023.  She will continue to follow-up with Dr. Shah on a regular basis and I will see her yearly or sooner if the clinical situation changes.    Diagnoses and all orders for this visit:    1. Essential hypertension (Primary)  -     ECG 12 Lead    2. Mixed hyperlipidemia    3. Palpitation        Patient's Body mass index is 29.26 kg/m². BMI is above normal parameters. Recommendations  include: exercise counseling and nutrition counseling.      Robert Mimi Larios  reports that she has never smoked. She has never used smokeless tobacco..          Peggy Govea MD  4/17/2023  11:38 CDT

## 2023-09-11 DIAGNOSIS — M81.0 AGE-RELATED OSTEOPOROSIS WITHOUT CURRENT PATHOLOGICAL FRACTURE: Primary | ICD-10-CM

## 2023-09-14 ENCOUNTER — LAB (OUTPATIENT)
Dept: LAB | Facility: HOSPITAL | Age: 78
End: 2023-09-14
Payer: MEDICARE

## 2023-09-14 DIAGNOSIS — M81.0 AGE-RELATED OSTEOPOROSIS WITHOUT CURRENT PATHOLOGICAL FRACTURE: ICD-10-CM

## 2023-09-14 LAB
25(OH)D3 SERPL-MCNC: 53.8 NG/ML (ref 30–100)
CALCIUM SPEC-SCNC: 9.3 MG/DL (ref 8.6–10.5)
MAGNESIUM SERPL-MCNC: 2.2 MG/DL (ref 1.6–2.4)
PHOSPHATE SERPL-MCNC: 3 MG/DL (ref 2.5–4.5)

## 2023-09-14 PROCEDURE — 82306 VITAMIN D 25 HYDROXY: CPT

## 2023-09-14 PROCEDURE — 82310 ASSAY OF CALCIUM: CPT

## 2023-09-14 PROCEDURE — 84100 ASSAY OF PHOSPHORUS: CPT

## 2023-09-14 PROCEDURE — 83735 ASSAY OF MAGNESIUM: CPT

## 2023-09-21 ENCOUNTER — INFUSION (OUTPATIENT)
Dept: ONCOLOGY | Facility: HOSPITAL | Age: 78
End: 2023-09-21
Payer: MEDICARE

## 2023-09-21 VITALS
HEART RATE: 81 BPM | RESPIRATION RATE: 18 BRPM | TEMPERATURE: 97 F | DIASTOLIC BLOOD PRESSURE: 65 MMHG | SYSTOLIC BLOOD PRESSURE: 135 MMHG

## 2023-09-21 DIAGNOSIS — M81.0 AGE-RELATED OSTEOPOROSIS WITHOUT CURRENT PATHOLOGICAL FRACTURE: Primary | ICD-10-CM

## 2023-09-21 PROCEDURE — 25010000002 DENOSUMAB 60 MG/ML SOLUTION PREFILLED SYRINGE: Performed by: GENERAL PRACTICE

## 2023-09-21 PROCEDURE — 96372 THER/PROPH/DIAG INJ SC/IM: CPT | Performed by: NURSE PRACTITIONER

## 2023-09-21 RX ADMIN — DENOSUMAB 60 MG: 60 INJECTION SUBCUTANEOUS at 09:41

## (undated) DEVICE — SINGLE-USE BIOPSY FORCEPS: Brand: RADIAL JAW 4

## (undated) DEVICE — CANN SMPL SOFTECH BIFLO ETCO2 A/M 7FT